# Patient Record
Sex: MALE | Race: BLACK OR AFRICAN AMERICAN | Employment: FULL TIME | URBAN - METROPOLITAN AREA
[De-identification: names, ages, dates, MRNs, and addresses within clinical notes are randomized per-mention and may not be internally consistent; named-entity substitution may affect disease eponyms.]

---

## 2017-05-26 ENCOUNTER — OFFICE VISIT (OUTPATIENT)
Dept: FAMILY MEDICINE CLINIC | Facility: CLINIC | Age: 42
End: 2017-05-26

## 2017-05-26 VITALS
OXYGEN SATURATION: 98 % | WEIGHT: 231.48 LBS | DIASTOLIC BLOOD PRESSURE: 130 MMHG | HEIGHT: 72 IN | SYSTOLIC BLOOD PRESSURE: 170 MMHG | HEART RATE: 90 BPM | RESPIRATION RATE: 15 BRPM | BODY MASS INDEX: 31.35 KG/M2

## 2017-05-26 DIAGNOSIS — I10 ESSENTIAL HYPERTENSION: ICD-10-CM

## 2017-05-26 PROCEDURE — 99999 PR OFFICE/OUTPT VISIT,PROCEDURE ONLY: CPT | Performed by: FAMILY MEDICINE

## 2017-05-26 ASSESSMENT — ENCOUNTER SYMPTOMS
CHEST TIGHTNESS: 0
TROUBLE SWALLOWING: 0
ABDOMINAL PAIN: 0
VOMITING: 0
COUGH: 0
SORE THROAT: 0
EYE PAIN: 0
SHORTNESS OF BREATH: 0
NAUSEA: 0
BACK PAIN: 1
WHEEZING: 0
DIARRHEA: 0

## 2017-06-05 ENCOUNTER — TELEPHONE (OUTPATIENT)
Dept: FAMILY MEDICINE CLINIC | Facility: CLINIC | Age: 42
End: 2017-06-05

## 2017-06-16 ENCOUNTER — TELEPHONE (OUTPATIENT)
Dept: ORTHOPEDIC SURGERY | Age: 42
End: 2017-06-16

## 2017-06-19 ENCOUNTER — OFFICE VISIT (OUTPATIENT)
Dept: FAMILY MEDICINE CLINIC | Facility: CLINIC | Age: 42
End: 2017-06-19

## 2017-06-19 VITALS
OXYGEN SATURATION: 98 % | HEART RATE: 65 BPM | WEIGHT: 231.48 LBS | BODY MASS INDEX: 31.35 KG/M2 | SYSTOLIC BLOOD PRESSURE: 158 MMHG | HEIGHT: 72 IN | RESPIRATION RATE: 15 BRPM | DIASTOLIC BLOOD PRESSURE: 109 MMHG

## 2017-06-19 DIAGNOSIS — I10 ESSENTIAL HYPERTENSION: ICD-10-CM

## 2017-06-19 DIAGNOSIS — S93.402D SPRAIN OF LEFT ANKLE, UNSPECIFIED LIGAMENT, SUBSEQUENT ENCOUNTER: Primary | ICD-10-CM

## 2017-06-19 PROCEDURE — 99999 PR OFFICE/OUTPT VISIT,PROCEDURE ONLY: CPT | Performed by: FAMILY MEDICINE

## 2017-06-19 RX ORDER — AMLODIPINE BESYLATE 10 MG/1
10 TABLET ORAL DAILY
Qty: 30 TABLET | Refills: 3 | Status: SHIPPED | OUTPATIENT
Start: 2017-06-19 | End: 2018-09-25

## 2017-06-19 RX ORDER — TRIAMTERENE AND HYDROCHLOROTHIAZIDE 75; 50 MG/1; MG/1
1 TABLET ORAL DAILY
Qty: 30 TABLET | Refills: 3 | Status: SHIPPED | OUTPATIENT
Start: 2017-06-19 | End: 2018-09-25

## 2017-07-14 ENCOUNTER — OFFICE VISIT (OUTPATIENT)
Dept: FAMILY MEDICINE CLINIC | Facility: CLINIC | Age: 42
End: 2017-07-14

## 2017-07-14 ENCOUNTER — TELEPHONE (OUTPATIENT)
Dept: FAMILY MEDICINE CLINIC | Facility: CLINIC | Age: 42
End: 2017-07-14

## 2017-07-14 VITALS
RESPIRATION RATE: 15 BRPM | HEART RATE: 62 BPM | OXYGEN SATURATION: 99 % | HEIGHT: 72 IN | SYSTOLIC BLOOD PRESSURE: 135 MMHG | WEIGHT: 233.69 LBS | BODY MASS INDEX: 31.65 KG/M2 | DIASTOLIC BLOOD PRESSURE: 97 MMHG

## 2017-07-14 DIAGNOSIS — I10 ESSENTIAL HYPERTENSION: Primary | ICD-10-CM

## 2017-07-14 PROCEDURE — 99999 PR OFFICE/OUTPT VISIT,PROCEDURE ONLY: CPT | Performed by: FAMILY MEDICINE

## 2017-07-14 RX ORDER — METOPROLOL SUCCINATE 50 MG/1
50 TABLET, EXTENDED RELEASE ORAL 2 TIMES DAILY
Qty: 60 TABLET | Refills: 3 | Status: CANCELLED | OUTPATIENT
Start: 2017-07-14

## 2017-07-17 RX ORDER — METOPROLOL TARTRATE 50 MG/1
50 TABLET, FILM COATED ORAL 2 TIMES DAILY
Qty: 60 TABLET | Refills: 3 | OUTPATIENT
Start: 2017-07-17 | End: 2018-01-03

## 2018-01-03 PROBLEM — N28.9 RENAL INSUFFICIENCY: Status: ACTIVE | Noted: 2018-01-03

## 2018-03-28 ENCOUNTER — TELEPHONE (OUTPATIENT)
Dept: INTERNAL MEDICINE CLINIC | Age: 43
End: 2018-03-28

## 2018-09-25 ENCOUNTER — HOSPITAL ENCOUNTER (EMERGENCY)
Age: 43
Discharge: HOME | End: 2018-09-25

## 2018-09-25 ENCOUNTER — APPOINTMENT (OUTPATIENT)
Dept: GENERAL RADIOLOGY | Age: 43
End: 2018-09-25

## 2018-09-25 ENCOUNTER — APPOINTMENT (OUTPATIENT)
Dept: CT IMAGING | Age: 43
End: 2018-09-25

## 2018-09-25 VITALS
RESPIRATION RATE: 17 BRPM | SYSTOLIC BLOOD PRESSURE: 178 MMHG | TEMPERATURE: 97.6 F | HEART RATE: 86 BPM | OXYGEN SATURATION: 97 % | DIASTOLIC BLOOD PRESSURE: 121 MMHG

## 2018-09-25 DIAGNOSIS — N28.9 RENAL INSUFFICIENCY: Primary | ICD-10-CM

## 2018-09-25 DIAGNOSIS — I10 ESSENTIAL HYPERTENSION: ICD-10-CM

## 2018-09-25 DIAGNOSIS — R10.31 RIGHT LOWER QUADRANT ABDOMINAL PAIN: ICD-10-CM

## 2018-09-25 LAB
A/G RATIO: 1 (ref 1.1–2.2)
ALBUMIN SERPL-MCNC: 4 G/DL (ref 3.4–5)
ALP BLD-CCNC: 80 U/L (ref 40–129)
ALT SERPL-CCNC: 26 U/L (ref 10–40)
ANION GAP SERPL CALCULATED.3IONS-SCNC: 14 MMOL/L (ref 3–16)
AST SERPL-CCNC: 21 U/L (ref 15–37)
BASOPHILS ABSOLUTE: 0 K/UL (ref 0–0.2)
BASOPHILS RELATIVE PERCENT: 0.7 %
BILIRUB SERPL-MCNC: 0.3 MG/DL (ref 0–1)
BILIRUBIN URINE: NEGATIVE
BLOOD, URINE: NEGATIVE
BUN BLDV-MCNC: 18 MG/DL (ref 7–20)
CALCIUM SERPL-MCNC: 9.4 MG/DL (ref 8.3–10.6)
CHLORIDE BLD-SCNC: 106 MMOL/L (ref 99–110)
CLARITY: CLEAR
CO2: 20 MMOL/L (ref 21–32)
COLOR: YELLOW
CREAT SERPL-MCNC: 1.6 MG/DL (ref 0.9–1.3)
EOSINOPHILS ABSOLUTE: 0.1 K/UL (ref 0–0.6)
EOSINOPHILS RELATIVE PERCENT: 1.5 %
GFR AFRICAN AMERICAN: 57
GFR NON-AFRICAN AMERICAN: 47
GLOBULIN: 4.1 G/DL
GLUCOSE BLD-MCNC: 109 MG/DL (ref 70–99)
GLUCOSE URINE: NEGATIVE MG/DL
HCT VFR BLD CALC: 44.6 % (ref 40.5–52.5)
HEMOGLOBIN: 15.1 G/DL (ref 13.5–17.5)
KETONES, URINE: NEGATIVE MG/DL
LEUKOCYTE ESTERASE, URINE: NEGATIVE
LIPASE: 36 U/L (ref 13–60)
LYMPHOCYTES ABSOLUTE: 1.9 K/UL (ref 1–5.1)
LYMPHOCYTES RELATIVE PERCENT: 38.9 %
MCH RBC QN AUTO: 29.8 PG (ref 26–34)
MCHC RBC AUTO-ENTMCNC: 33.9 G/DL (ref 31–36)
MCV RBC AUTO: 87.8 FL (ref 80–100)
MICROSCOPIC EXAMINATION: NORMAL
MONOCYTES ABSOLUTE: 0.4 K/UL (ref 0–1.3)
MONOCYTES RELATIVE PERCENT: 8.8 %
NEUTROPHILS ABSOLUTE: 2.4 K/UL (ref 1.7–7.7)
NEUTROPHILS RELATIVE PERCENT: 50.1 %
NITRITE, URINE: NEGATIVE
PDW BLD-RTO: 14.5 % (ref 12.4–15.4)
PH UA: 5.5
PLATELET # BLD: 354 K/UL (ref 135–450)
PMV BLD AUTO: 8.1 FL (ref 5–10.5)
POTASSIUM SERPL-SCNC: 4.1 MMOL/L (ref 3.5–5.1)
PROTEIN UA: NEGATIVE MG/DL
RBC # BLD: 5.08 M/UL (ref 4.2–5.9)
SODIUM BLD-SCNC: 140 MMOL/L (ref 136–145)
SPECIFIC GRAVITY UA: 1.03
TOTAL PROTEIN: 8.1 G/DL (ref 6.4–8.2)
URINE REFLEX TO CULTURE: NORMAL
URINE TYPE: NORMAL
UROBILINOGEN, URINE: 0.2 E.U./DL
WBC # BLD: 4.8 K/UL (ref 4–11)

## 2018-09-25 PROCEDURE — 83690 ASSAY OF LIPASE: CPT

## 2018-09-25 PROCEDURE — 85025 COMPLETE CBC W/AUTO DIFF WBC: CPT

## 2018-09-25 PROCEDURE — 6360000002 HC RX W HCPCS: Performed by: PHYSICIAN ASSISTANT

## 2018-09-25 PROCEDURE — 80053 COMPREHEN METABOLIC PANEL: CPT

## 2018-09-25 PROCEDURE — 96374 THER/PROPH/DIAG INJ IV PUSH: CPT

## 2018-09-25 PROCEDURE — 36415 COLL VENOUS BLD VENIPUNCTURE: CPT

## 2018-09-25 PROCEDURE — 74176 CT ABD & PELVIS W/O CONTRAST: CPT

## 2018-09-25 PROCEDURE — 81003 URINALYSIS AUTO W/O SCOPE: CPT

## 2018-09-25 PROCEDURE — 99284 EMERGENCY DEPT VISIT MOD MDM: CPT

## 2018-09-25 RX ORDER — TRIAMTERENE AND HYDROCHLOROTHIAZIDE 75; 50 MG/1; MG/1
1 TABLET ORAL DAILY
Qty: 30 TABLET | Refills: 3 | Status: ON HOLD | OUTPATIENT
Start: 2018-09-25 | End: 2019-09-27 | Stop reason: SDUPTHER

## 2018-09-25 RX ORDER — ASPIRIN 81 MG/1
324 TABLET, CHEWABLE ORAL ONCE
Status: DISCONTINUED | OUTPATIENT
Start: 2018-09-25 | End: 2018-09-25

## 2018-09-25 RX ORDER — KETOROLAC TROMETHAMINE 30 MG/ML
30 INJECTION, SOLUTION INTRAMUSCULAR; INTRAVENOUS ONCE
Status: COMPLETED | OUTPATIENT
Start: 2018-09-25 | End: 2018-09-25

## 2018-09-25 RX ORDER — AMLODIPINE BESYLATE 10 MG/1
10 TABLET ORAL DAILY
Qty: 30 TABLET | Refills: 3 | Status: ON HOLD | OUTPATIENT
Start: 2018-09-25 | End: 2019-09-26

## 2018-09-25 RX ORDER — METOPROLOL TARTRATE 50 MG/1
50 TABLET, FILM COATED ORAL 2 TIMES DAILY
Qty: 30 TABLET | Refills: 0 | Status: ON HOLD | OUTPATIENT
Start: 2018-09-25 | End: 2019-09-27 | Stop reason: SDUPTHER

## 2018-09-25 RX ORDER — NITROGLYCERIN 0.4 MG/1
0.4 TABLET SUBLINGUAL EVERY 5 MIN PRN
Status: DISCONTINUED | OUTPATIENT
Start: 2018-09-25 | End: 2018-09-25

## 2018-09-25 RX ADMIN — KETOROLAC TROMETHAMINE 30 MG: 30 INJECTION, SOLUTION INTRAMUSCULAR at 17:30

## 2018-09-25 ASSESSMENT — PAIN SCALES - GENERAL
PAINLEVEL_OUTOF10: 10
PAINLEVEL_OUTOF10: 10

## 2018-09-25 ASSESSMENT — PAIN DESCRIPTION - LOCATION: LOCATION: ABDOMEN;BACK

## 2018-09-25 ASSESSMENT — PAIN DESCRIPTION - PAIN TYPE: TYPE: ACUTE PAIN

## 2018-09-25 ASSESSMENT — PAIN DESCRIPTION - FREQUENCY: FREQUENCY: CONTINUOUS

## 2018-09-25 NOTE — ED PROVIDER NOTES
As provider-in-triage, I performed a brief history and physical exam to establish acuity, and ordered appropriate tests to develop a basic plan of care. Patient will have a more thorough history and physical exam and will be documented by the treating PEYTON and/or physician who will evaluate the patient. PROVIDER [facility]  eMERGENCY dEPARTMENT eNCOUnter   Pt Name: Ramon Jessica  MRN: [id number]  [place] 1975  Date of evaluation: 9/25/2018  PCP: Akira Garcia MD    Chief Complaint   Patient presents with    Abdominal Pain     PT to er reporting right sided abdominal pain starting yesterday. HISTORY OF PRESENT ILLNESS  Ramon Jessica is a 37 y.o. male who presents to the emergency department with complaints of Right flank pain. This was dull when it started this morning to get worse about 3 hours ago. He has had kidney stones in the past.  Denies nausea, vomiting, fevers or urinary bowel symptoms. PHYSICAL EXAM  BP (!) 178/121   Pulse 86   Temp 97.6 °F (36.4 °C) (Infrared)   Resp 17   SpO2 97%     On brief exam with patient sitting up, the patient appears well-hydrated, well-nourished, and in no acute distress. Mucous membranes are moist.    Skin is dry. Mental status is normal. Moves all extremities, and is without facial droop. Speech is clear. Heart is regular rate and rhythm. Breathing is unlabored. Lungs clear to auscultation bilaterally. Plan:    Medications   ketorolac (TORADOL) injection 30 mg (not administered)       LABS:   Labs Reviewed   CBC WITH AUTO DIFFERENTIAL   COMPREHENSIVE METABOLIC PANEL   URINE RT REFLEX TO CULTURE   LIPASE     Radiology Studies:   CT ABDOMEN PELVIS WO CONTRAST    (Results Pending)       For all further history/physical please see subsequent provider note for further details and disposition.     Comment: Please note this report has been produced using speech recognition software and may
DISPOSITION Decision To Discharge 09/25/2018 06:12:39 PM      PATIENT REFERRED TO:  Tamera Schmitt  [phone]  Call today  For a recheck in 2-7 days    Dennise Ludn MD  [address], [address] [facility] [address]  [phone]    Call today  For a recheck in 2-7 days      DISCHARGE MEDICATIONS:  New Prescriptions    No medications on file       DISCONTINUED MEDICATIONS:  Discontinued Medications    ACETAMINOPHEN (AMINOFEN) 325 MG TABLET    Take 2 tablets by mouth every 6 hours as needed for Pain              (Please note the MDM and HPI sections of this note were completed with a voice recognition program.  Efforts were made to edit the dictations but occasionally words are mis-transcribed.)    Electronically signed, Jones Mandel,          Jones Mandel  09/25/18 6649

## 2018-09-25 NOTE — ED NOTES
Patient discharged at this time in no acute distress after verbalizing understanding of discharge instructions and need for follow up with PCP .   Pt left ambulatory to discharge area after reviewing and receiving copy of ov AVS.   Patient education  Learner- Patient and family  Motivation and readiness to learn- Medium to High  Barriers to learning- None  Learning preference/provided instructions- Both written and verbal discharge instructions     Tung Fitzgerald RN  09/25/18 [id number]

## 2019-03-01 ENCOUNTER — HOSPITAL ENCOUNTER (EMERGENCY)
Age: 44
Discharge: HOME | End: 2019-03-01

## 2019-03-01 ENCOUNTER — APPOINTMENT (OUTPATIENT)
Dept: GENERAL RADIOLOGY | Age: 44
End: 2019-03-01

## 2019-03-01 VITALS
BODY MASS INDEX: 31.48 KG/M2 | DIASTOLIC BLOOD PRESSURE: 99 MMHG | SYSTOLIC BLOOD PRESSURE: 163 MMHG | HEART RATE: 89 BPM | WEIGHT: 232.13 LBS | RESPIRATION RATE: 21 BRPM | OXYGEN SATURATION: 98 % | TEMPERATURE: 103.1 F

## 2019-03-01 DIAGNOSIS — J10.1 INFLUENZA A: Primary | ICD-10-CM

## 2019-03-01 LAB
ANION GAP SERPL CALCULATED.3IONS-SCNC: 14 MMOL/L (ref 3–16)
BANDED NEUTROPHILS RELATIVE PERCENT: 6 % (ref 0–7)
BASOPHILS ABSOLUTE: 0 K/UL (ref 0–0.2)
BASOPHILS RELATIVE PERCENT: 0 %
BUN BLDV-MCNC: 15 MG/DL (ref 7–20)
CALCIUM SERPL-MCNC: 8.1 MG/DL (ref 8.3–10.6)
CHLORIDE BLD-SCNC: 104 MMOL/L (ref 99–110)
CO2: 17 MMOL/L (ref 21–32)
CREAT SERPL-MCNC: 1.6 MG/DL (ref 0.9–1.3)
EKG ATRIAL RATE: 97 BPM
EKG DIAGNOSIS: NORMAL
EKG P AXIS: 29 DEGREES
EKG P-R INTERVAL: 142 MS
EKG Q-T INTERVAL: 318 MS
EKG QRS DURATION: 74 MS
EKG QTC CALCULATION (BAZETT): 403 MS
EKG R AXIS: 38 DEGREES
EKG T AXIS: 5 DEGREES
EKG VENTRICULAR RATE: 97 BPM
EOSINOPHILS ABSOLUTE: 0 K/UL (ref 0–0.6)
EOSINOPHILS RELATIVE PERCENT: 0 %
GFR AFRICAN AMERICAN: 57
GFR NON-AFRICAN AMERICAN: 47
GLUCOSE BLD-MCNC: 113 MG/DL (ref 70–99)
HCT VFR BLD CALC: 41.6 % (ref 40.5–52.5)
HEMOGLOBIN: 14.1 G/DL (ref 13.5–17.5)
LYMPHOCYTES ABSOLUTE: 1.4 K/UL (ref 1–5.1)
LYMPHOCYTES RELATIVE PERCENT: 17 %
MCH RBC QN AUTO: 29.8 PG (ref 26–34)
MCHC RBC AUTO-ENTMCNC: 33.8 G/DL (ref 31–36)
MCV RBC AUTO: 88.2 FL (ref 80–100)
MONOCYTES ABSOLUTE: 0.3 K/UL (ref 0–1.3)
MONOCYTES RELATIVE PERCENT: 3 %
NEUTROPHILS ABSOLUTE: 6.8 K/UL (ref 1.7–7.7)
NEUTROPHILS RELATIVE PERCENT: 74 %
PDW BLD-RTO: 14 % (ref 12.4–15.4)
PLATELET # BLD: 362 K/UL (ref 135–450)
PLATELET SLIDE REVIEW: ADEQUATE
PMV BLD AUTO: 7.8 FL (ref 5–10.5)
POTASSIUM SERPL-SCNC: 3.8 MMOL/L (ref 3.5–5.1)
RAPID INFLUENZA  B AGN: NEGATIVE
RAPID INFLUENZA A AGN: POSITIVE
RBC # BLD: 4.72 M/UL (ref 4.2–5.9)
REASON FOR REJECTION: NORMAL
REJECTED TEST: NORMAL
SLIDE REVIEW: NORMAL
SODIUM BLD-SCNC: 135 MMOL/L (ref 136–145)
WBC # BLD: 8.5 K/UL (ref 4–11)

## 2019-03-01 PROCEDURE — 93010 ELECTROCARDIOGRAM REPORT: CPT | Performed by: INTERNAL MEDICINE

## 2019-03-01 PROCEDURE — 6360000002 HC RX W HCPCS: Performed by: NURSE PRACTITIONER

## 2019-03-01 PROCEDURE — 96374 THER/PROPH/DIAG INJ IV PUSH: CPT

## 2019-03-01 PROCEDURE — 96361 HYDRATE IV INFUSION ADD-ON: CPT

## 2019-03-01 PROCEDURE — 96375 TX/PRO/DX INJ NEW DRUG ADDON: CPT

## 2019-03-01 PROCEDURE — 85025 COMPLETE CBC W/AUTO DIFF WBC: CPT

## 2019-03-01 PROCEDURE — 36415 COLL VENOUS BLD VENIPUNCTURE: CPT

## 2019-03-01 PROCEDURE — 6370000000 HC RX 637 (ALT 250 FOR IP): Performed by: NURSE PRACTITIONER

## 2019-03-01 PROCEDURE — 87804 INFLUENZA ASSAY W/OPTIC: CPT

## 2019-03-01 PROCEDURE — 2580000003 HC RX 258: Performed by: NURSE PRACTITIONER

## 2019-03-01 PROCEDURE — 99285 EMERGENCY DEPT VISIT HI MDM: CPT

## 2019-03-01 PROCEDURE — 80048 BASIC METABOLIC PNL TOTAL CA: CPT

## 2019-03-01 PROCEDURE — 93005 ELECTROCARDIOGRAM TRACING: CPT | Performed by: EMERGENCY MEDICINE

## 2019-03-01 PROCEDURE — 71046 X-RAY EXAM CHEST 2 VIEWS: CPT

## 2019-03-01 RX ORDER — ACETAMINOPHEN 500 MG
1000 TABLET ORAL ONCE
Status: COMPLETED | OUTPATIENT
Start: 2019-03-01 | End: 2019-03-01

## 2019-03-01 RX ORDER — ONDANSETRON 2 MG/ML
4 INJECTION INTRAMUSCULAR; INTRAVENOUS EVERY 30 MIN PRN
Status: DISCONTINUED | OUTPATIENT
Start: 2019-03-01 | End: 2019-03-01 | Stop reason: HOSPADM

## 2019-03-01 RX ORDER — KETOROLAC TROMETHAMINE 30 MG/ML
30 INJECTION, SOLUTION INTRAMUSCULAR; INTRAVENOUS ONCE
Status: COMPLETED | OUTPATIENT
Start: 2019-03-01 | End: 2019-03-01

## 2019-03-01 RX ORDER — 0.9 % SODIUM CHLORIDE 0.9 %
1000 INTRAVENOUS SOLUTION INTRAVENOUS ONCE
Status: COMPLETED | OUTPATIENT
Start: 2019-03-01 | End: 2019-03-01

## 2019-03-01 RX ORDER — OSELTAMIVIR PHOSPHATE 75 MG/1
75 CAPSULE ORAL 2 TIMES DAILY
Qty: 10 CAPSULE | Refills: 0 | Status: SHIPPED | OUTPATIENT
Start: 2019-03-01 | End: 2019-03-06

## 2019-03-01 RX ADMIN — ONDANSETRON 4 MG: 2 INJECTION INTRAMUSCULAR; INTRAVENOUS at 10:29

## 2019-03-01 RX ADMIN — ACETAMINOPHEN 1000 MG: 500 TABLET, FILM COATED ORAL at 10:28

## 2019-03-01 RX ADMIN — SODIUM CHLORIDE 1000 ML: 9 INJECTION, SOLUTION INTRAVENOUS at 10:28

## 2019-03-01 RX ADMIN — KETOROLAC TROMETHAMINE 30 MG: 30 INJECTION, SOLUTION INTRAMUSCULAR at 10:29

## 2019-03-01 ASSESSMENT — ENCOUNTER SYMPTOMS
SHORTNESS OF BREATH: 1
CONSTIPATION: 0
ABDOMINAL PAIN: 0
SORE THROAT: 0
NAUSEA: 0
RHINORRHEA: 0
VOMITING: 0
COUGH: 1
DIARRHEA: 0
BLOOD IN STOOL: 0

## 2019-03-01 ASSESSMENT — PAIN SCALES - GENERAL
PAINLEVEL_OUTOF10: 4
PAINLEVEL_OUTOF10: 9

## 2019-03-01 ASSESSMENT — PAIN DESCRIPTION - PAIN TYPE: TYPE: ACUTE PAIN

## 2019-03-05 ENCOUNTER — APPOINTMENT (OUTPATIENT)
Dept: GENERAL RADIOLOGY | Age: 44
End: 2019-03-05

## 2019-03-05 ENCOUNTER — HOSPITAL ENCOUNTER (EMERGENCY)
Age: 44
Discharge: HOME | End: 2019-03-05
Attending: EMERGENCY MEDICINE

## 2019-03-05 VITALS
OXYGEN SATURATION: 94 % | RESPIRATION RATE: 18 BRPM | DIASTOLIC BLOOD PRESSURE: 95 MMHG | BODY MASS INDEX: 31.15 KG/M2 | SYSTOLIC BLOOD PRESSURE: 158 MMHG | HEART RATE: 101 BPM | WEIGHT: 230 LBS | HEIGHT: 72 IN | TEMPERATURE: 99.1 F

## 2019-03-05 DIAGNOSIS — J20.9 ACUTE BRONCHITIS, UNSPECIFIED ORGANISM: Primary | ICD-10-CM

## 2019-03-05 PROCEDURE — 94640 AIRWAY INHALATION TREATMENT: CPT

## 2019-03-05 PROCEDURE — 71046 X-RAY EXAM CHEST 2 VIEWS: CPT

## 2019-03-05 PROCEDURE — 6370000000 HC RX 637 (ALT 250 FOR IP): Performed by: PHYSICIAN ASSISTANT

## 2019-03-05 PROCEDURE — 99283 EMERGENCY DEPT VISIT LOW MDM: CPT

## 2019-03-05 RX ORDER — IBUPROFEN 800 MG/1
800 TABLET ORAL ONCE
Status: COMPLETED | OUTPATIENT
Start: 2019-03-05 | End: 2019-03-05

## 2019-03-05 RX ORDER — AZITHROMYCIN 250 MG/1
TABLET, FILM COATED ORAL
Qty: 1 PACKET | Refills: 0 | Status: SHIPPED | OUTPATIENT
Start: 2019-03-05 | End: 2019-03-15

## 2019-03-05 RX ORDER — IPRATROPIUM BROMIDE AND ALBUTEROL SULFATE 2.5; .5 MG/3ML; MG/3ML
1 SOLUTION RESPIRATORY (INHALATION) ONCE
Status: COMPLETED | OUTPATIENT
Start: 2019-03-05 | End: 2019-03-05

## 2019-03-05 RX ORDER — PREDNISONE 10 MG/1
60 TABLET ORAL DAILY
Qty: 30 TABLET | Refills: 0 | Status: SHIPPED | OUTPATIENT
Start: 2019-03-05 | End: 2019-03-10

## 2019-03-05 RX ORDER — PREDNISONE 20 MG/1
60 TABLET ORAL ONCE
Status: COMPLETED | OUTPATIENT
Start: 2019-03-05 | End: 2019-03-05

## 2019-03-05 RX ORDER — ALBUTEROL SULFATE 90 UG/1
2 AEROSOL, METERED RESPIRATORY (INHALATION) EVERY 4 HOURS PRN
Qty: 1 INHALER | Refills: 0 | Status: SHIPPED | OUTPATIENT
Start: 2019-03-05 | End: 2021-06-20

## 2019-03-05 RX ADMIN — IPRATROPIUM BROMIDE AND ALBUTEROL SULFATE 1 AMPULE: .5; 3 SOLUTION RESPIRATORY (INHALATION) at 10:44

## 2019-03-05 RX ADMIN — IBUPROFEN 800 MG: 800 TABLET, FILM COATED ORAL at 10:56

## 2019-03-05 RX ADMIN — PREDNISONE 60 MG: 20 TABLET ORAL at 10:56

## 2019-03-05 ASSESSMENT — ENCOUNTER SYMPTOMS
ABDOMINAL PAIN: 0
BACK PAIN: 0
ABDOMINAL DISTENTION: 0
VOMITING: 0
WHEEZING: 0
RHINORRHEA: 1
SHORTNESS OF BREATH: 1
CONSTIPATION: 0
VOICE CHANGE: 0
ALLERGIC/IMMUNOLOGIC NEGATIVE: 1
SORE THROAT: 1
COUGH: 1
COLOR CHANGE: 0
CHEST TIGHTNESS: 1
DIARRHEA: 0
STRIDOR: 0
NAUSEA: 0
TROUBLE SWALLOWING: 0

## 2019-03-05 ASSESSMENT — PAIN DESCRIPTION - LOCATION
LOCATION: GENERALIZED
LOCATION: THROAT

## 2019-03-05 ASSESSMENT — PAIN DESCRIPTION - PAIN TYPE: TYPE: ACUTE PAIN

## 2019-03-05 ASSESSMENT — PAIN SCALES - GENERAL: PAINLEVEL_OUTOF10: 10

## 2019-03-07 ENCOUNTER — OFFICE VISIT (OUTPATIENT)
Dept: PRIMARY CARE CLINIC | Age: 44
End: 2019-03-07

## 2019-03-07 VITALS
HEART RATE: 91 BPM | WEIGHT: 226.2 LBS | SYSTOLIC BLOOD PRESSURE: 132 MMHG | TEMPERATURE: 98.2 F | BODY MASS INDEX: 30.68 KG/M2 | DIASTOLIC BLOOD PRESSURE: 84 MMHG | OXYGEN SATURATION: 98 %

## 2019-03-07 DIAGNOSIS — J20.9 ACUTE BRONCHITIS, UNSPECIFIED ORGANISM: ICD-10-CM

## 2019-03-07 DIAGNOSIS — J11.1 INFLUENZA: Primary | ICD-10-CM

## 2019-03-07 DIAGNOSIS — R06.2 DIFFUSE WHEEZING: ICD-10-CM

## 2019-03-07 PROCEDURE — 99203 OFFICE O/P NEW LOW 30 MIN: CPT | Performed by: NURSE PRACTITIONER

## 2019-03-07 RX ORDER — AMOXICILLIN AND CLAVULANATE POTASSIUM 875; 125 MG/1; MG/1
1 TABLET, FILM COATED ORAL 2 TIMES DAILY
Qty: 20 TABLET | Refills: 0 | Status: SHIPPED | OUTPATIENT
Start: 2019-03-07 | End: 2019-03-17

## 2019-03-07 RX ORDER — ALBUTEROL SULFATE 2.5 MG/3ML
2.5 SOLUTION RESPIRATORY (INHALATION) ONCE
Status: DISCONTINUED | OUTPATIENT
Start: 2019-03-07 | End: 2021-11-26 | Stop reason: HOSPADM

## 2019-03-13 ASSESSMENT — ENCOUNTER SYMPTOMS
WHEEZING: 1
CONSTIPATION: 0
COUGH: 1
EYE ITCHING: 0
DIARRHEA: 0
CHOKING: 0
NAUSEA: 0
SORE THROAT: 1
SHORTNESS OF BREATH: 1
BACK PAIN: 0
VOMITING: 0
ABDOMINAL PAIN: 0
EYE REDNESS: 0
STRIDOR: 0
EYE PAIN: 0
RHINORRHEA: 0
SINUS PAIN: 0
APNEA: 0
COLOR CHANGE: 0
SINUS PRESSURE: 0
EYE DISCHARGE: 0
TROUBLE SWALLOWING: 0
CHEST TIGHTNESS: 1

## 2019-03-18 ENCOUNTER — TELEPHONE (OUTPATIENT)
Dept: PRIMARY CARE CLINIC | Age: 44
End: 2019-03-18

## 2019-09-26 ENCOUNTER — APPOINTMENT (OUTPATIENT)
Dept: GENERAL RADIOLOGY | Age: 44
DRG: 305 | End: 2019-09-26

## 2019-09-26 ENCOUNTER — HOSPITAL ENCOUNTER (INPATIENT)
Age: 44
LOS: 1 days | Discharge: HOME | DRG: 305 | End: 2019-09-27
Attending: EMERGENCY MEDICINE | Admitting: EMERGENCY MEDICINE

## 2019-09-26 ENCOUNTER — APPOINTMENT (OUTPATIENT)
Dept: CT IMAGING | Age: 44
DRG: 305 | End: 2019-09-26

## 2019-09-26 DIAGNOSIS — R06.02 SOB (SHORTNESS OF BREATH): ICD-10-CM

## 2019-09-26 DIAGNOSIS — I10 ESSENTIAL HYPERTENSION: ICD-10-CM

## 2019-09-26 DIAGNOSIS — I10 UNCONTROLLED HYPERTENSION: Primary | ICD-10-CM

## 2019-09-26 DIAGNOSIS — N18.9 CHRONIC KIDNEY DISEASE, UNSPECIFIED CKD STAGE: ICD-10-CM

## 2019-09-26 PROBLEM — R07.9 CHEST PAIN: Status: ACTIVE | Noted: 2019-09-26

## 2019-09-26 LAB
ANION GAP SERPL CALCULATED.3IONS-SCNC: 11 MMOL/L (ref 3–16)
BASOPHILS ABSOLUTE: 0 K/UL (ref 0–0.2)
BASOPHILS RELATIVE PERCENT: 0.8 %
BUN BLDV-MCNC: 12 MG/DL (ref 7–20)
CALCIUM SERPL-MCNC: 8.9 MG/DL (ref 8.3–10.6)
CHLORIDE BLD-SCNC: 110 MMOL/L (ref 99–110)
CO2: 20 MMOL/L (ref 21–32)
CREAT SERPL-MCNC: 1.4 MG/DL (ref 0.9–1.3)
EKG ATRIAL RATE: 77 BPM
EKG DIAGNOSIS: NORMAL
EKG P AXIS: 25 DEGREES
EKG P-R INTERVAL: 154 MS
EKG Q-T INTERVAL: 418 MS
EKG QRS DURATION: 82 MS
EKG QTC CALCULATION (BAZETT): 473 MS
EKG R AXIS: 31 DEGREES
EKG T AXIS: 5 DEGREES
EKG VENTRICULAR RATE: 77 BPM
EOSINOPHILS ABSOLUTE: 0.2 K/UL (ref 0–0.6)
EOSINOPHILS RELATIVE PERCENT: 3.5 %
GFR AFRICAN AMERICAN: >60
GFR NON-AFRICAN AMERICAN: 55
GLUCOSE BLD-MCNC: 84 MG/DL (ref 70–99)
HCT VFR BLD CALC: 43.8 % (ref 40.5–52.5)
HEMOGLOBIN: 14.3 G/DL (ref 13.5–17.5)
LYMPHOCYTES ABSOLUTE: 2 K/UL (ref 1–5.1)
LYMPHOCYTES RELATIVE PERCENT: 34.2 %
MCH RBC QN AUTO: 28.7 PG (ref 26–34)
MCHC RBC AUTO-ENTMCNC: 32.7 G/DL (ref 31–36)
MCV RBC AUTO: 88 FL (ref 80–100)
MONOCYTES ABSOLUTE: 0.7 K/UL (ref 0–1.3)
MONOCYTES RELATIVE PERCENT: 11.9 %
NEUTROPHILS ABSOLUTE: 2.8 K/UL (ref 1.7–7.7)
NEUTROPHILS RELATIVE PERCENT: 49.6 %
PDW BLD-RTO: 15.1 % (ref 12.4–15.4)
PLATELET # BLD: 305 K/UL (ref 135–450)
PMV BLD AUTO: 7.9 FL (ref 5–10.5)
POTASSIUM SERPL-SCNC: 3.6 MMOL/L (ref 3.5–5.1)
PRO-BNP: 17 PG/ML (ref 0–124)
RBC # BLD: 4.98 M/UL (ref 4.2–5.9)
SODIUM BLD-SCNC: 141 MMOL/L (ref 136–145)
TROPONIN: <0.01 NG/ML
TROPONIN: <0.01 NG/ML
WBC # BLD: 5.7 K/UL (ref 4–11)

## 2019-09-26 PROCEDURE — 6360000002 HC RX W HCPCS: Performed by: EMERGENCY MEDICINE

## 2019-09-26 PROCEDURE — 96374 THER/PROPH/DIAG INJ IV PUSH: CPT

## 2019-09-26 PROCEDURE — 2580000003 HC RX 258: Performed by: INTERNAL MEDICINE

## 2019-09-26 PROCEDURE — 6360000002 HC RX W HCPCS: Performed by: INTERNAL MEDICINE

## 2019-09-26 PROCEDURE — 6360000002 HC RX W HCPCS: Performed by: NURSE PRACTITIONER

## 2019-09-26 PROCEDURE — 71046 X-RAY EXAM CHEST 2 VIEWS: CPT

## 2019-09-26 PROCEDURE — 84484 ASSAY OF TROPONIN QUANT: CPT

## 2019-09-26 PROCEDURE — 94640 AIRWAY INHALATION TREATMENT: CPT

## 2019-09-26 PROCEDURE — 6370000000 HC RX 637 (ALT 250 FOR IP): Performed by: NURSE PRACTITIONER

## 2019-09-26 PROCEDURE — 85025 COMPLETE CBC W/AUTO DIFF WBC: CPT

## 2019-09-26 PROCEDURE — 99285 EMERGENCY DEPT VISIT HI MDM: CPT

## 2019-09-26 PROCEDURE — 6370000000 HC RX 637 (ALT 250 FOR IP): Performed by: INTERNAL MEDICINE

## 2019-09-26 PROCEDURE — 71250 CT THORAX DX C-: CPT

## 2019-09-26 PROCEDURE — 1200000000 HC SEMI PRIVATE

## 2019-09-26 PROCEDURE — 94760 N-INVAS EAR/PLS OXIMETRY 1: CPT

## 2019-09-26 PROCEDURE — 6370000000 HC RX 637 (ALT 250 FOR IP): Performed by: EMERGENCY MEDICINE

## 2019-09-26 PROCEDURE — 80048 BASIC METABOLIC PNL TOTAL CA: CPT

## 2019-09-26 PROCEDURE — 93005 ELECTROCARDIOGRAM TRACING: CPT | Performed by: EMERGENCY MEDICINE

## 2019-09-26 PROCEDURE — 83880 ASSAY OF NATRIURETIC PEPTIDE: CPT

## 2019-09-26 PROCEDURE — 36415 COLL VENOUS BLD VENIPUNCTURE: CPT

## 2019-09-26 PROCEDURE — 93010 ELECTROCARDIOGRAM REPORT: CPT | Performed by: INTERNAL MEDICINE

## 2019-09-26 RX ORDER — AMLODIPINE BESYLATE 5 MG/1
10 TABLET ORAL ONCE
Status: COMPLETED | OUTPATIENT
Start: 2019-09-26 | End: 2019-09-26

## 2019-09-26 RX ORDER — LABETALOL HYDROCHLORIDE 5 MG/ML
20 INJECTION, SOLUTION INTRAVENOUS EVERY 6 HOURS PRN
Status: DISCONTINUED | OUTPATIENT
Start: 2019-09-26 | End: 2019-09-27 | Stop reason: HOSPADM

## 2019-09-26 RX ORDER — SODIUM CHLORIDE 0.9 % (FLUSH) 0.9 %
10 SYRINGE (ML) INJECTION PRN
Status: DISCONTINUED | OUTPATIENT
Start: 2019-09-26 | End: 2019-09-27 | Stop reason: HOSPADM

## 2019-09-26 RX ORDER — ALBUTEROL SULFATE 90 UG/1
2 AEROSOL, METERED RESPIRATORY (INHALATION) EVERY 4 HOURS PRN
Status: DISCONTINUED | OUTPATIENT
Start: 2019-09-26 | End: 2019-09-27 | Stop reason: HOSPADM

## 2019-09-26 RX ORDER — IPRATROPIUM BROMIDE AND ALBUTEROL SULFATE 2.5; .5 MG/3ML; MG/3ML
1 SOLUTION RESPIRATORY (INHALATION) ONCE
Status: COMPLETED | OUTPATIENT
Start: 2019-09-26 | End: 2019-09-26

## 2019-09-26 RX ORDER — ATORVASTATIN CALCIUM 40 MG/1
40 TABLET, FILM COATED ORAL NIGHTLY
Status: DISCONTINUED | OUTPATIENT
Start: 2019-09-26 | End: 2019-09-27 | Stop reason: HOSPADM

## 2019-09-26 RX ORDER — LISINOPRIL 10 MG/1
10 TABLET ORAL DAILY
Status: DISCONTINUED | OUTPATIENT
Start: 2019-09-27 | End: 2019-09-27 | Stop reason: HOSPADM

## 2019-09-26 RX ORDER — LEVOFLOXACIN 500 MG/1
500 TABLET, FILM COATED ORAL DAILY
Status: DISCONTINUED | OUTPATIENT
Start: 2019-09-26 | End: 2019-09-27 | Stop reason: HOSPADM

## 2019-09-26 RX ORDER — ACETAMINOPHEN 325 MG/1
650 TABLET ORAL EVERY 6 HOURS PRN
Status: DISCONTINUED | OUTPATIENT
Start: 2019-09-26 | End: 2019-09-27 | Stop reason: HOSPADM

## 2019-09-26 RX ORDER — METOPROLOL TARTRATE 50 MG/1
50 TABLET, FILM COATED ORAL ONCE
Status: COMPLETED | OUTPATIENT
Start: 2019-09-26 | End: 2019-09-26

## 2019-09-26 RX ORDER — METOPROLOL TARTRATE 50 MG/1
50 TABLET, FILM COATED ORAL 2 TIMES DAILY
Status: DISCONTINUED | OUTPATIENT
Start: 2019-09-26 | End: 2019-09-27 | Stop reason: HOSPADM

## 2019-09-26 RX ORDER — TRIAMTERENE AND HYDROCHLOROTHIAZIDE 37.5; 25 MG/1; MG/1
2 TABLET ORAL DAILY
Status: DISCONTINUED | OUTPATIENT
Start: 2019-09-27 | End: 2019-09-27 | Stop reason: HOSPADM

## 2019-09-26 RX ORDER — GUAIFENESIN/DEXTROMETHORPHAN 100-10MG/5
5 SYRUP ORAL EVERY 4 HOURS PRN
Status: DISCONTINUED | OUTPATIENT
Start: 2019-09-26 | End: 2019-09-27 | Stop reason: HOSPADM

## 2019-09-26 RX ORDER — SODIUM CHLORIDE 0.9 % (FLUSH) 0.9 %
10 SYRINGE (ML) INJECTION EVERY 12 HOURS SCHEDULED
Status: DISCONTINUED | OUTPATIENT
Start: 2019-09-26 | End: 2019-09-27 | Stop reason: HOSPADM

## 2019-09-26 RX ORDER — ONDANSETRON 2 MG/ML
4 INJECTION INTRAMUSCULAR; INTRAVENOUS EVERY 6 HOURS PRN
Status: DISCONTINUED | OUTPATIENT
Start: 2019-09-26 | End: 2019-09-27 | Stop reason: HOSPADM

## 2019-09-26 RX ORDER — ASPIRIN 81 MG/1
81 TABLET, CHEWABLE ORAL DAILY
Status: DISCONTINUED | OUTPATIENT
Start: 2019-09-27 | End: 2019-09-27 | Stop reason: HOSPADM

## 2019-09-26 RX ORDER — HYDRALAZINE HYDROCHLORIDE 20 MG/ML
10 INJECTION INTRAMUSCULAR; INTRAVENOUS ONCE
Status: COMPLETED | OUTPATIENT
Start: 2019-09-26 | End: 2019-09-26

## 2019-09-26 RX ORDER — NITROGLYCERIN 0.4 MG/1
0.4 TABLET SUBLINGUAL EVERY 5 MIN PRN
Status: DISCONTINUED | OUTPATIENT
Start: 2019-09-26 | End: 2019-09-27 | Stop reason: HOSPADM

## 2019-09-26 RX ORDER — ALBUTEROL SULFATE 2.5 MG/3ML
5 SOLUTION RESPIRATORY (INHALATION) ONCE
Status: DISCONTINUED | OUTPATIENT
Start: 2019-09-26 | End: 2019-09-26

## 2019-09-26 RX ORDER — DOXYCYCLINE HYCLATE 100 MG
100 TABLET ORAL ONCE
Status: COMPLETED | OUTPATIENT
Start: 2019-09-26 | End: 2019-09-26

## 2019-09-26 RX ORDER — TRIAMTERENE AND HYDROCHLOROTHIAZIDE 37.5; 25 MG/1; MG/1
2 TABLET ORAL ONCE
Status: COMPLETED | OUTPATIENT
Start: 2019-09-26 | End: 2019-09-26

## 2019-09-26 RX ADMIN — METOPROLOL TARTRATE 50 MG: 50 TABLET, FILM COATED ORAL at 14:41

## 2019-09-26 RX ADMIN — GUAIFENESIN AND DEXTROMETHORPHAN 5 ML: 100; 10 SYRUP ORAL at 22:07

## 2019-09-26 RX ADMIN — AMLODIPINE BESYLATE 10 MG: 5 TABLET ORAL at 14:41

## 2019-09-26 RX ADMIN — DESMOPRESSIN ACETATE 40 MG: 0.2 TABLET ORAL at 22:07

## 2019-09-26 RX ADMIN — METOPROLOL TARTRATE 50 MG: 50 TABLET, FILM COATED ORAL at 22:07

## 2019-09-26 RX ADMIN — ONDANSETRON 4 MG: 2 INJECTION INTRAMUSCULAR; INTRAVENOUS at 21:30

## 2019-09-26 RX ADMIN — ALBUTEROL SULFATE 5 MG: 2.5 SOLUTION RESPIRATORY (INHALATION) at 14:47

## 2019-09-26 RX ADMIN — TRIAMTERENE AND HYDROCHLOROTHIAZIDE 2 TABLET: 37.5; 25 TABLET ORAL at 15:32

## 2019-09-26 RX ADMIN — Medication 10 ML: at 22:07

## 2019-09-26 RX ADMIN — HYDRALAZINE HYDROCHLORIDE 10 MG: 20 INJECTION INTRAMUSCULAR; INTRAVENOUS at 18:10

## 2019-09-26 RX ADMIN — IPRATROPIUM BROMIDE AND ALBUTEROL SULFATE 1 AMPULE: .5; 3 SOLUTION RESPIRATORY (INHALATION) at 14:47

## 2019-09-26 RX ADMIN — ACETAMINOPHEN 650 MG: 325 TABLET, FILM COATED ORAL at 22:07

## 2019-09-26 RX ADMIN — LEVOFLOXACIN 500 MG: 500 TABLET, FILM COATED ORAL at 22:07

## 2019-09-26 RX ADMIN — DOXYCYCLINE HYCLATE 100 MG: 100 TABLET, COATED ORAL at 18:55

## 2019-09-26 ASSESSMENT — PAIN SCALES - GENERAL
PAINLEVEL_OUTOF10: 10
PAINLEVEL_OUTOF10: 5
PAINLEVEL_OUTOF10: 10
PAINLEVEL_OUTOF10: 6

## 2019-09-26 ASSESSMENT — PAIN DESCRIPTION - PAIN TYPE
TYPE: ACUTE PAIN

## 2019-09-26 ASSESSMENT — PAIN DESCRIPTION - ORIENTATION
ORIENTATION: LEFT;RIGHT
ORIENTATION: RIGHT;LEFT

## 2019-09-26 ASSESSMENT — ENCOUNTER SYMPTOMS
COUGH: 1
BLOOD IN STOOL: 0
RHINORRHEA: 0
CONSTIPATION: 0
SORE THROAT: 0
SHORTNESS OF BREATH: 0
ABDOMINAL PAIN: 0
VOMITING: 0
NAUSEA: 0
DIARRHEA: 0

## 2019-09-26 ASSESSMENT — PAIN DESCRIPTION - DESCRIPTORS
DESCRIPTORS: HEADACHE
DESCRIPTORS: HEADACHE

## 2019-09-26 ASSESSMENT — PAIN DESCRIPTION - LOCATION
LOCATION: HEAD
LOCATION: HEAD
LOCATION: CHEST

## 2019-09-26 ASSESSMENT — PAIN DESCRIPTION - FREQUENCY: FREQUENCY: INTERMITTENT

## 2019-09-26 NOTE — ED PROVIDER NOTES
of hypertension. He should be on medications but is not compliant with these due to primary care issues. He was treated with antihypertensive agents in the ER with continued hypertension. He has a history of chronic kidney disease. This is stable from prior. No elevation in his troponin. The hospitalist was consulted and is in agreement for admission. This plan was discussed with the patient, and my attending, Oneil Spencer, *, and both are in agreement with the plan. Please see attending note. FINAL IMPRESSION      1. Uncontrolled hypertension    2.  Chronic kidney disease, unspecified CKD stage          DISPOSITION/PLAN   DISPOSITION Decision To Admit 09/26/2019 05:56:31 PM      (Please note that portions of this note were completed with a voice recognition program.  Efforts were made to edit the dictations but occasionally words are mis-transcribed.)    NISHANT Elizabeth CNP (electronically signed)        NISHANT Elizabeth CNP  09/26/19 0617

## 2019-09-26 NOTE — H&P
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 male [id number]  PCP:  Gita Contreras MD (Tel: [phone] )    Chief complaint:    Chief Complaint   Patient presents with    Shortness of Breath     SOB , chest pain started 6 weeks ago. thinks 6 weeks ago had dinner with birds flying around and cough started after this dinner. coughing, wheezing, sob since the dinner and the birds. fever started started 2 days after the dinner and the fever went away. History of Present Illness:  Kristina Welch is a 53 y.o. male is a known case of hypertension noncompliant with therapy is not taking any antihypertensives for the last 2 weeks he presents to us with worsening shortness of breath, cough and chest tightness because of his complaint he came to the ER was noted to uncontrolled blood pressure on presentation was 215/146    Received amlodipine, hydralazine IV, metoprolol p.o., Maxide. blood pressure currently 166 / 108        REVIEW OF SYSTEMS:     10 point ROS was completed and negative unless noted above    Past Medical History:   has a past medical history of Hypertension. Past Surgical History:   has no past surgical history on file. Medications:  Current Facility-Administered Medications on File Prior to Encounter   Medication Dose Route Frequency Provider Last Rate Last Dose    albuterol (PROVENTIL) nebulizer solution 2.5 mg  2.5 mg Nebulization Once NISHANT Zelaya NP         Current Outpatient Medications on File Prior to Encounter   Medication Sig Dispense Refill    albuterol sulfate HFA (PROVENTIL HFA) 108 (90 Base) MCG/ACT inhaler Inhale 2 puffs into the lungs every 4 hours as needed for Wheezing or Shortness of Breath (Space out to every 6 hours as symptoms improve) Space out to every 6 hours as symptoms improve.  1 09/26/2019     BMP:    Lab Results   Component Value Date     09/26/2019    K 3.6 09/26/2019     09/26/2019    CO2 20 09/26/2019    BUN 12 09/26/2019    CREATININE 1.4 09/26/2019    CALCIUM 8.9 09/26/2019    GFRAA >60 09/26/2019    LABGLOM 55 09/26/2019    GLUCOSE 84 09/26/2019     XR CHEST STANDARD (2 VW)   Final Result   Normal chest.         CT CHEST WO CONTRAST    (Results Pending)   NM MYOCARDIAL SPECT REST EXERCISE OR RX    (Results Pending)     Chest Xray: Nothing acute  EKG: Normal sinus rhythm nonspecific ST-T changes present      Problem List  Active Problems:    Chest pain  Resolved Problems:    * No resolved hospital problems. *        Assessment/Plan:     Hypertensive urgency on presentation received p.o. medications IV hydralazine noncompliance will resume home antihypertensive add lisinopril to the regimen add PRN labetalol. Chest pain chest pain pathway has been activated. Serial troponins Lexiscan in the morning    Exertional shortness of breath obtain echo cardisevero Marks to rule out cardia myopathy from uncontrolled blood pressure    Chronic cough for the last 6 weeks get CT of the chest without contrast received oxygen in the ER will give p.o.  Levaquin    Chronic Kidney disease secondary to uncontrolled blood pressure needs outpatient nephrology follow-up    Educated about need for being compliant with blood pressure medication    DVT prophylaxis heparin   Code status Full   Diet cardiac       Alleyjordin Johnson MD    9/26/2019 7:51 PM

## 2019-09-27 VITALS
SYSTOLIC BLOOD PRESSURE: 147 MMHG | WEIGHT: 233.8 LBS | BODY MASS INDEX: 31.67 KG/M2 | OXYGEN SATURATION: 95 % | HEIGHT: 72 IN | DIASTOLIC BLOOD PRESSURE: 98 MMHG | TEMPERATURE: 98 F | HEART RATE: 77 BPM | RESPIRATION RATE: 18 BRPM

## 2019-09-27 PROBLEM — R06.02 SOB (SHORTNESS OF BREATH): Status: ACTIVE | Noted: 2019-09-27

## 2019-09-27 LAB
HCT VFR BLD CALC: 45.4 % (ref 40.5–52.5)
HEMOGLOBIN: 15 G/DL (ref 13.5–17.5)
LEFT VENTRICULAR EJECTION FRACTION HIGH VALUE: 60 %
LEFT VENTRICULAR EJECTION FRACTION MODE: NORMAL
LV EF: 55 %
LV EF: 58 %
LV EF: 66 %
LVEF MODALITY: NORMAL
LVEF MODALITY: NORMAL
MCH RBC QN AUTO: 28.8 PG (ref 26–34)
MCHC RBC AUTO-ENTMCNC: 33 G/DL (ref 31–36)
MCV RBC AUTO: 87.2 FL (ref 80–100)
PDW BLD-RTO: 14.8 % (ref 12.4–15.4)
PLATELET # BLD: 362 K/UL (ref 135–450)
PMV BLD AUTO: 7.9 FL (ref 5–10.5)
RBC # BLD: 5.2 M/UL (ref 4.2–5.9)
TROPONIN: <0.01 NG/ML
WBC # BLD: 5.3 K/UL (ref 4–11)

## 2019-09-27 PROCEDURE — 3430000000 HC RX DIAGNOSTIC RADIOPHARMACEUTICAL: Performed by: INTERNAL MEDICINE

## 2019-09-27 PROCEDURE — 36415 COLL VENOUS BLD VENIPUNCTURE: CPT

## 2019-09-27 PROCEDURE — 85027 COMPLETE CBC AUTOMATED: CPT

## 2019-09-27 PROCEDURE — 2580000003 HC RX 258: Performed by: INTERNAL MEDICINE

## 2019-09-27 PROCEDURE — 84484 ASSAY OF TROPONIN QUANT: CPT

## 2019-09-27 PROCEDURE — 94760 N-INVAS EAR/PLS OXIMETRY 1: CPT

## 2019-09-27 PROCEDURE — 78452 HT MUSCLE IMAGE SPECT MULT: CPT | Performed by: INTERNAL MEDICINE

## 2019-09-27 PROCEDURE — 6360000002 HC RX W HCPCS: Performed by: INTERNAL MEDICINE

## 2019-09-27 PROCEDURE — 93306 TTE W/DOPPLER COMPLETE: CPT

## 2019-09-27 PROCEDURE — A9502 TC99M TETROFOSMIN: HCPCS | Performed by: INTERNAL MEDICINE

## 2019-09-27 PROCEDURE — 6370000000 HC RX 637 (ALT 250 FOR IP): Performed by: NURSE PRACTITIONER

## 2019-09-27 PROCEDURE — 6370000000 HC RX 637 (ALT 250 FOR IP): Performed by: INTERNAL MEDICINE

## 2019-09-27 PROCEDURE — 93017 CV STRESS TEST TRACING ONLY: CPT | Performed by: INTERNAL MEDICINE

## 2019-09-27 PROCEDURE — 6360000004 HC RX CONTRAST MEDICATION: Performed by: INTERNAL MEDICINE

## 2019-09-27 RX ORDER — ACETAMINOPHEN, ASPIRIN AND CAFFEINE 250; 250; 65 MG/1; MG/1; MG/1
1 TABLET, FILM COATED ORAL ONCE
Status: COMPLETED | OUTPATIENT
Start: 2019-09-27 | End: 2019-09-27

## 2019-09-27 RX ORDER — METOPROLOL TARTRATE 50 MG/1
50 TABLET, FILM COATED ORAL 2 TIMES DAILY
Qty: 30 TABLET | Refills: 1 | Status: SHIPPED | OUTPATIENT
Start: 2019-09-27 | End: 2021-06-20

## 2019-09-27 RX ORDER — TRIAMTERENE AND HYDROCHLOROTHIAZIDE 75; 50 MG/1; MG/1
1 TABLET ORAL DAILY
Qty: 30 TABLET | Refills: 1 | Status: SHIPPED | OUTPATIENT
Start: 2019-09-27 | End: 2021-06-20

## 2019-09-27 RX ORDER — LEVOFLOXACIN 500 MG/1
500 TABLET, FILM COATED ORAL DAILY
Qty: 5 TABLET | Refills: 0 | Status: SHIPPED | OUTPATIENT
Start: 2019-09-27 | End: 2019-10-02

## 2019-09-27 RX ADMIN — REGADENOSON 0.4 MG: 0.08 INJECTION, SOLUTION INTRAVENOUS at 08:56

## 2019-09-27 RX ADMIN — Medication 10 ML: at 10:18

## 2019-09-27 RX ADMIN — METOPROLOL TARTRATE 50 MG: 50 TABLET, FILM COATED ORAL at 10:17

## 2019-09-27 RX ADMIN — PERFLUTREN 1.5 MG: 6.52 INJECTION, SUSPENSION INTRAVENOUS at 08:08

## 2019-09-27 RX ADMIN — TRIAMTERENE AND HYDROCHLOROTHIAZIDE 2 TABLET: 37.5; 25 TABLET ORAL at 12:23

## 2019-09-27 RX ADMIN — ASPIRIN 81 MG 81 MG: 81 TABLET ORAL at 10:17

## 2019-09-27 RX ADMIN — TETROFOSMIN 10 MILLICURIE: 1.38 INJECTION, POWDER, LYOPHILIZED, FOR SOLUTION INTRAVENOUS at 07:25

## 2019-09-27 RX ADMIN — LISINOPRIL 10 MG: 10 TABLET ORAL at 10:17

## 2019-09-27 RX ADMIN — ACETAMINOPHEN, ASPIRIN AND CAFFEINE 1 TABLET: 250; 250; 65 TABLET, FILM COATED ORAL at 01:51

## 2019-09-27 RX ADMIN — TETROFOSMIN 30 MILLICURIE: 1.38 INJECTION, POWDER, LYOPHILIZED, FOR SOLUTION INTRAVENOUS at 08:55

## 2019-09-27 ASSESSMENT — PAIN DESCRIPTION - PROGRESSION
CLINICAL_PROGRESSION: RESOLVED

## 2019-09-27 ASSESSMENT — PAIN SCALES - GENERAL
PAINLEVEL_OUTOF10: 10
PAINLEVEL_OUTOF10: 2
PAINLEVEL_OUTOF10: 2
PAINLEVEL_OUTOF10: 10

## 2019-09-27 ASSESSMENT — PAIN DESCRIPTION - LOCATION
LOCATION: HEAD
LOCATION: HEAD

## 2019-09-27 ASSESSMENT — PAIN DESCRIPTION - PAIN TYPE
TYPE: ACUTE PAIN

## 2019-09-27 ASSESSMENT — PAIN DESCRIPTION - DESCRIPTORS: DESCRIPTORS: HEADACHE

## 2019-09-27 ASSESSMENT — PAIN DESCRIPTION - ORIENTATION
ORIENTATION: RIGHT;LEFT
ORIENTATION: RIGHT;LEFT

## 2019-09-27 ASSESSMENT — PAIN DESCRIPTION - FREQUENCY: FREQUENCY: CONTINUOUS

## 2019-09-27 NOTE — PROGRESS NOTES
Pt admitted to room 3312. VSS. Pt A&Ox4. Went over [address] with pt and spouse. Pt v/u. Oriented pt to room and call light. Instructed pt to call out with any needs. Pt independent low fall risk. Bed side table and call light within reach. Will monitor.

## 2019-09-27 NOTE — PROGRESS NOTES
Data- discharge order received, pt verbalized agreement to discharge, disposition to previous residence, no needs for HHC/DME. Action- discharge instructions prepared and given to patient and significant other, pt verbalized understanding. Medication information packet given r/t NEW and/or CHANGED prescriptions emphasizing name/purpose/side effects, pt verbalized understanding. Discharge instruction summary: Diet- general, Activity- as tolerated, Primary Care Physician as follows: Arsenio Crews -[phone] f/u appointment within one week, immunizations reviewed and updated, prescription medications filled by patient. Inpatient surgical procedure precautions reviewed: n/a. Response- Pt belongings gathered, IV removed. Disposition is home (no HHC/DME needs), transported with family, no complications.

## 2019-09-27 NOTE — PLAN OF CARE
Problem: Pain:  Goal: Pain level will decrease  Description  Pain level will decrease  Outcome: Ongoing  Note:   Pt admitted with chest pain. Trops negative x3. Pt currently in stress lab. Pain control as needed. Problem: Respiratory  Goal: O2 Sat > 90%  Outcome: Ongoing  Note:   Pt on room air. No need for supplemental oxygenation.

## 2019-09-27 NOTE — PROGRESS NOTES
Patient instructed on Rodney Protocol Stress Test Procedure including possible side effects and adverse reactions. Verbalizes knowledge and understanding and denies having any questions. EKG from 9/26/19 and pre test reviewed with Dr. Angela Redding. Patient unable to reach target heart on treadmill with Rodney Protocol and unable to go any further or any faster per Patient. Dr. Angela Redding notified and reported dose of Excedrin during night. See new order. Instructed on Lexiscan Stress Test Procedure including possible side effects/ adverse reactions. Patient verbalizes  understanding and denies having any questions . See [address] [place] [address] Cardiology

## 2021-06-20 ENCOUNTER — HOSPITAL ENCOUNTER (INPATIENT)
Age: 46
LOS: 5 days | Discharge: HOME | DRG: 199 | End: 2021-06-25
Attending: EMERGENCY MEDICINE | Admitting: EMERGENCY MEDICINE
Payer: MEDICAID

## 2021-06-20 ENCOUNTER — APPOINTMENT (OUTPATIENT)
Dept: CT IMAGING | Age: 46
DRG: 199 | End: 2021-06-20
Payer: MEDICAID

## 2021-06-20 ENCOUNTER — APPOINTMENT (OUTPATIENT)
Dept: GENERAL RADIOLOGY | Age: 46
DRG: 199 | End: 2021-06-20
Payer: MEDICAID

## 2021-06-20 DIAGNOSIS — R51.9 ACUTE NONINTRACTABLE HEADACHE, UNSPECIFIED HEADACHE TYPE: ICD-10-CM

## 2021-06-20 DIAGNOSIS — I16.0 HYPERTENSIVE URGENCY: Primary | ICD-10-CM

## 2021-06-20 DIAGNOSIS — R42 DIZZINESS: ICD-10-CM

## 2021-06-20 LAB
A/G RATIO: 1.2 (ref 1.1–2.2)
ALBUMIN SERPL-MCNC: 4 G/DL (ref 3.4–5)
ALP BLD-CCNC: 95 U/L (ref 40–129)
ALT SERPL-CCNC: 40 U/L (ref 10–40)
ANION GAP SERPL CALCULATED.3IONS-SCNC: 11 MMOL/L (ref 3–16)
APTT: 36.8 SEC (ref 24.2–36.2)
AST SERPL-CCNC: 22 U/L (ref 15–37)
BASOPHILS ABSOLUTE: 0 K/UL (ref 0–0.2)
BASOPHILS RELATIVE PERCENT: 0.9 %
BILIRUB SERPL-MCNC: 0.5 MG/DL (ref 0–1)
BILIRUBIN URINE: NEGATIVE
BLOOD, URINE: NEGATIVE
BUN BLDV-MCNC: 15 MG/DL (ref 7–20)
CALCIUM SERPL-MCNC: 8.9 MG/DL (ref 8.3–10.6)
CHLORIDE BLD-SCNC: 105 MMOL/L (ref 99–110)
CLARITY: CLEAR
CO2: 23 MMOL/L (ref 21–32)
COLOR: YELLOW
CREAT SERPL-MCNC: 1.5 MG/DL (ref 0.9–1.3)
EOSINOPHILS ABSOLUTE: 0.2 K/UL (ref 0–0.6)
EOSINOPHILS RELATIVE PERCENT: 4.3 %
GFR AFRICAN AMERICAN: >60
GFR NON-AFRICAN AMERICAN: 50
GLOBULIN: 3.4 G/DL
GLUCOSE BLD-MCNC: 102 MG/DL (ref 70–99)
GLUCOSE URINE: NEGATIVE MG/DL
HCT VFR BLD CALC: 47.5 % (ref 40.5–52.5)
HEMOGLOBIN: 15.8 G/DL (ref 13.5–17.5)
INR BLD: 1.01 (ref 0.86–1.14)
KETONES, URINE: NEGATIVE MG/DL
LEUKOCYTE ESTERASE, URINE: NEGATIVE
LYMPHOCYTES ABSOLUTE: 2.3 K/UL (ref 1–5.1)
LYMPHOCYTES RELATIVE PERCENT: 45.3 %
MCH RBC QN AUTO: 28.8 PG (ref 26–34)
MCHC RBC AUTO-ENTMCNC: 33.4 G/DL (ref 31–36)
MCV RBC AUTO: 86.2 FL (ref 80–100)
MICROSCOPIC EXAMINATION: NORMAL
MONOCYTES ABSOLUTE: 0.4 K/UL (ref 0–1.3)
MONOCYTES RELATIVE PERCENT: 8.7 %
NEUTROPHILS ABSOLUTE: 2.1 K/UL (ref 1.7–7.7)
NEUTROPHILS RELATIVE PERCENT: 40.8 %
NITRITE, URINE: NEGATIVE
PDW BLD-RTO: 15 % (ref 12.4–15.4)
PH UA: 7.5 (ref 5–8)
PLATELET # BLD: 335 K/UL (ref 135–450)
PMV BLD AUTO: 8.4 FL (ref 5–10.5)
POTASSIUM REFLEX MAGNESIUM: 3.8 MMOL/L (ref 3.5–5.1)
PRO-BNP: 146 PG/ML (ref 0–124)
PROTEIN UA: NEGATIVE MG/DL
PROTHROMBIN TIME: 11.7 SEC (ref 10–13.2)
RBC # BLD: 5.5 M/UL (ref 4.2–5.9)
SODIUM BLD-SCNC: 139 MMOL/L (ref 136–145)
SPECIFIC GRAVITY UA: >1.03 (ref 1–1.03)
TOTAL PROTEIN: 7.4 G/DL (ref 6.4–8.2)
TROPONIN: <0.01 NG/ML
URINE REFLEX TO CULTURE: NORMAL
URINE TYPE: NORMAL
UROBILINOGEN, URINE: 0.2 E.U./DL
WBC # BLD: 5.1 K/UL (ref 4–11)

## 2021-06-20 PROCEDURE — 84484 ASSAY OF TROPONIN QUANT: CPT

## 2021-06-20 PROCEDURE — 96374 THER/PROPH/DIAG INJ IV PUSH: CPT

## 2021-06-20 PROCEDURE — 2060000000 HC ICU INTERMEDIATE R&B

## 2021-06-20 PROCEDURE — 6370000000 HC RX 637 (ALT 250 FOR IP): Performed by: PHYSICIAN ASSISTANT

## 2021-06-20 PROCEDURE — 6370000000 HC RX 637 (ALT 250 FOR IP): Performed by: INTERNAL MEDICINE

## 2021-06-20 PROCEDURE — 6360000002 HC RX W HCPCS: Performed by: EMERGENCY MEDICINE

## 2021-06-20 PROCEDURE — 93005 ELECTROCARDIOGRAM TRACING: CPT | Performed by: PHYSICIAN ASSISTANT

## 2021-06-20 PROCEDURE — 36415 COLL VENOUS BLD VENIPUNCTURE: CPT

## 2021-06-20 PROCEDURE — 83880 ASSAY OF NATRIURETIC PEPTIDE: CPT

## 2021-06-20 PROCEDURE — 2580000003 HC RX 258: Performed by: INTERNAL MEDICINE

## 2021-06-20 PROCEDURE — 70496 CT ANGIOGRAPHY HEAD: CPT

## 2021-06-20 PROCEDURE — 81003 URINALYSIS AUTO W/O SCOPE: CPT

## 2021-06-20 PROCEDURE — 99283 EMERGENCY DEPT VISIT LOW MDM: CPT

## 2021-06-20 PROCEDURE — 70450 CT HEAD/BRAIN W/O DYE: CPT

## 2021-06-20 PROCEDURE — 6360000004 HC RX CONTRAST MEDICATION: Performed by: PHYSICIAN ASSISTANT

## 2021-06-20 PROCEDURE — 6360000002 HC RX W HCPCS: Performed by: PHYSICIAN ASSISTANT

## 2021-06-20 PROCEDURE — 71045 X-RAY EXAM CHEST 1 VIEW: CPT

## 2021-06-20 PROCEDURE — 6360000002 HC RX W HCPCS: Performed by: INTERNAL MEDICINE

## 2021-06-20 PROCEDURE — 85730 THROMBOPLASTIN TIME PARTIAL: CPT

## 2021-06-20 PROCEDURE — 80053 COMPREHEN METABOLIC PANEL: CPT

## 2021-06-20 PROCEDURE — 85025 COMPLETE CBC W/AUTO DIFF WBC: CPT

## 2021-06-20 PROCEDURE — 96375 TX/PRO/DX INJ NEW DRUG ADDON: CPT

## 2021-06-20 PROCEDURE — 85610 PROTHROMBIN TIME: CPT

## 2021-06-20 RX ORDER — ACETAMINOPHEN 500 MG
1000 TABLET ORAL ONCE
Status: COMPLETED | OUTPATIENT
Start: 2021-06-20 | End: 2021-06-20

## 2021-06-20 RX ORDER — CLONIDINE HYDROCHLORIDE 0.1 MG/1
0.1 TABLET ORAL ONCE
Status: COMPLETED | OUTPATIENT
Start: 2021-06-20 | End: 2021-06-20

## 2021-06-20 RX ORDER — HYDRALAZINE HYDROCHLORIDE 20 MG/ML
10 INJECTION INTRAMUSCULAR; INTRAVENOUS EVERY 6 HOURS PRN
Status: DISCONTINUED | OUTPATIENT
Start: 2021-06-20 | End: 2021-06-21

## 2021-06-20 RX ORDER — DIPHENHYDRAMINE HYDROCHLORIDE 50 MG/ML
25 INJECTION INTRAMUSCULAR; INTRAVENOUS ONCE
Status: COMPLETED | OUTPATIENT
Start: 2021-06-20 | End: 2021-06-20

## 2021-06-20 RX ORDER — SODIUM CHLORIDE 9 MG/ML
25 INJECTION, SOLUTION INTRAVENOUS PRN
Status: DISCONTINUED | OUTPATIENT
Start: 2021-06-20 | End: 2021-06-25 | Stop reason: HOSPADM

## 2021-06-20 RX ORDER — ACETAMINOPHEN 650 MG/1
650 SUPPOSITORY RECTAL EVERY 4 HOURS PRN
Status: DISCONTINUED | OUTPATIENT
Start: 2021-06-20 | End: 2021-06-25 | Stop reason: HOSPADM

## 2021-06-20 RX ORDER — ASPIRIN 81 MG/1
81 TABLET ORAL DAILY
Status: DISCONTINUED | OUTPATIENT
Start: 2021-06-20 | End: 2021-06-25 | Stop reason: HOSPADM

## 2021-06-20 RX ORDER — ACETAMINOPHEN 325 MG/1
650 TABLET ORAL EVERY 4 HOURS PRN
Status: DISCONTINUED | OUTPATIENT
Start: 2021-06-20 | End: 2021-06-25 | Stop reason: HOSPADM

## 2021-06-20 RX ORDER — HYDRALAZINE HYDROCHLORIDE 20 MG/ML
20 INJECTION INTRAMUSCULAR; INTRAVENOUS ONCE
Status: COMPLETED | OUTPATIENT
Start: 2021-06-20 | End: 2021-06-20

## 2021-06-20 RX ORDER — POLYETHYLENE GLYCOL 3350 17 G/17G
17 POWDER, FOR SOLUTION ORAL DAILY PRN
Status: DISCONTINUED | OUTPATIENT
Start: 2021-06-20 | End: 2021-06-25 | Stop reason: HOSPADM

## 2021-06-20 RX ORDER — SODIUM CHLORIDE 0.9 % (FLUSH) 0.9 %
5-40 SYRINGE (ML) INJECTION EVERY 12 HOURS SCHEDULED
Status: DISCONTINUED | OUTPATIENT
Start: 2021-06-20 | End: 2021-06-25 | Stop reason: HOSPADM

## 2021-06-20 RX ORDER — ASPIRIN 300 MG/1
300 SUPPOSITORY RECTAL DAILY
Status: DISCONTINUED | OUTPATIENT
Start: 2021-06-20 | End: 2021-06-25 | Stop reason: HOSPADM

## 2021-06-20 RX ORDER — ATORVASTATIN CALCIUM 40 MG/1
40 TABLET, FILM COATED ORAL NIGHTLY
Status: DISCONTINUED | OUTPATIENT
Start: 2021-06-20 | End: 2021-06-25 | Stop reason: HOSPADM

## 2021-06-20 RX ORDER — SODIUM CHLORIDE 0.9 % (FLUSH) 0.9 %
5-40 SYRINGE (ML) INJECTION PRN
Status: DISCONTINUED | OUTPATIENT
Start: 2021-06-20 | End: 2021-06-25 | Stop reason: HOSPADM

## 2021-06-20 RX ORDER — ONDANSETRON 2 MG/ML
4 INJECTION INTRAMUSCULAR; INTRAVENOUS EVERY 6 HOURS PRN
Status: DISCONTINUED | OUTPATIENT
Start: 2021-06-20 | End: 2021-06-25 | Stop reason: HOSPADM

## 2021-06-20 RX ORDER — HYDRALAZINE HYDROCHLORIDE 25 MG/1
50 TABLET, FILM COATED ORAL EVERY 8 HOURS SCHEDULED
Status: DISCONTINUED | OUTPATIENT
Start: 2021-06-20 | End: 2021-06-25

## 2021-06-20 RX ORDER — DIPHENHYDRAMINE HCL 25 MG
25 TABLET ORAL EVERY 4 HOURS PRN
Status: DISCONTINUED | OUTPATIENT
Start: 2021-06-20 | End: 2021-06-25 | Stop reason: HOSPADM

## 2021-06-20 RX ORDER — ONDANSETRON 4 MG/1
4 TABLET, ORALLY DISINTEGRATING ORAL EVERY 8 HOURS PRN
Status: DISCONTINUED | OUTPATIENT
Start: 2021-06-20 | End: 2021-06-25 | Stop reason: HOSPADM

## 2021-06-20 RX ORDER — METOCLOPRAMIDE HYDROCHLORIDE 5 MG/ML
10 INJECTION INTRAMUSCULAR; INTRAVENOUS ONCE
Status: COMPLETED | OUTPATIENT
Start: 2021-06-20 | End: 2021-06-20

## 2021-06-20 RX ORDER — LOSARTAN POTASSIUM 25 MG/1
50 TABLET ORAL DAILY
Status: DISCONTINUED | OUTPATIENT
Start: 2021-06-20 | End: 2021-06-21

## 2021-06-20 RX ADMIN — CLONIDINE HYDROCHLORIDE 0.1 MG: 0.1 TABLET ORAL at 23:34

## 2021-06-20 RX ADMIN — DIPHENHYDRAMINE HYDROCHLORIDE 25 MG: 50 INJECTION INTRAMUSCULAR; INTRAVENOUS at 11:00

## 2021-06-20 RX ADMIN — ACETAMINOPHEN 1000 MG: 500 TABLET ORAL at 10:26

## 2021-06-20 RX ADMIN — Medication 10 ML: at 21:28

## 2021-06-20 RX ADMIN — IOPAMIDOL 75 ML: 755 INJECTION, SOLUTION INTRAVENOUS at 11:25

## 2021-06-20 RX ADMIN — HYDRALAZINE HYDROCHLORIDE 50 MG: 25 TABLET, FILM COATED ORAL at 16:51

## 2021-06-20 RX ADMIN — ATORVASTATIN CALCIUM 40 MG: 40 TABLET, FILM COATED ORAL at 21:28

## 2021-06-20 RX ADMIN — ASPIRIN 81 MG: 81 TABLET, COATED ORAL at 16:51

## 2021-06-20 RX ADMIN — DIPHENHYDRAMINE HYDROCHLORIDE 25 MG: 25 TABLET ORAL at 22:51

## 2021-06-20 RX ADMIN — METOCLOPRAMIDE 10 MG: 5 INJECTION, SOLUTION INTRAMUSCULAR; INTRAVENOUS at 11:00

## 2021-06-20 RX ADMIN — LOSARTAN POTASSIUM 50 MG: 25 TABLET, FILM COATED ORAL at 16:51

## 2021-06-20 RX ADMIN — ENOXAPARIN SODIUM 40 MG: 40 INJECTION SUBCUTANEOUS at 21:31

## 2021-06-20 RX ADMIN — HYDRALAZINE HYDROCHLORIDE 20 MG: 20 INJECTION INTRAMUSCULAR; INTRAVENOUS at 11:00

## 2021-06-20 RX ADMIN — HYDRALAZINE HYDROCHLORIDE 50 MG: 25 TABLET, FILM COATED ORAL at 21:54

## 2021-06-20 RX ADMIN — HYDRALAZINE HYDROCHLORIDE 10 MG: 20 INJECTION INTRAMUSCULAR; INTRAVENOUS at 21:27

## 2021-06-20 ASSESSMENT — ENCOUNTER SYMPTOMS
CONSTIPATION: 0
SHORTNESS OF BREATH: 0
NAUSEA: 0
BACK PAIN: 0
ABDOMINAL PAIN: 0
COUGH: 0
CHEST TIGHTNESS: 0
VOMITING: 0
DIARRHEA: 0
RESPIRATORY NEGATIVE: 1
PHOTOPHOBIA: 0
COLOR CHANGE: 0

## 2021-06-20 ASSESSMENT — PAIN SCALES - GENERAL
PAINLEVEL_OUTOF10: 0
PAINLEVEL_OUTOF10: 10
PAINLEVEL_OUTOF10: 10
PAINLEVEL_OUTOF10: 0
PAINLEVEL_OUTOF10: 7

## 2021-06-20 ASSESSMENT — PAIN DESCRIPTION - LOCATION
LOCATION: HEAD

## 2021-06-20 ASSESSMENT — PAIN DESCRIPTION - PAIN TYPE
TYPE: ACUTE PAIN

## 2021-06-20 NOTE — H&P
other systems reviewed and negative. PHYSICAL EXAM PERFORMED:    BP (!) 151/102   Pulse 56   Temp 98.9 °F (37.2 °C) (Oral)   Resp 24   Ht 6' (1.829 m)   Wt 235 lb (106.6 kg)   SpO2 97%   BMI 31.87 kg/m²     General appearance:  No apparent distress, appears stated age and cooperative. HEENT:  Normal cephalic, atraumatic without obvious deformity. Pupils equal, round, and reactive to light. Extra ocular muscles intact. Conjunctivae/corneas clear. Neck: Supple, with full range of motion. No jugular venous distention. Trachea midline. Respiratory:  Normal respiratory effort. Clear to auscultation, bilaterally without Rales/Wheezes/Rhonchi. Cardiovascular:  Regular rate and rhythm with normal S1/S2 without murmurs, rubs or gallops. Abdomen: Soft, non-tender, non-distended with normal bowel sounds. Musculoskeletal:  No clubbing, cyanosis or edema bilaterally. Full range of motion without deformity. Skin: Skin color, texture, turgor normal.  No rashes or lesions. Neurologic:  Neurovascularly intact without any focal sensory/motor deficits.  Cranial nerves: II-XII intact, grossly non-focal.  Psychiatric:  Alert and oriented, thought content appropriate, normal insight  Capillary Refill: Brisk,< 3 seconds   Peripheral Pulses: +2 palpable, equal bilaterally       Labs:     Recent Labs     06/20/21  1023   WBC 5.1   HGB 15.8   HCT 47.5        Recent Labs     06/20/21  1023      K 3.8      CO2 23   BUN 15   CREATININE 1.5*   CALCIUM 8.9     Recent Labs     06/20/21  1023   AST 22   ALT 40   BILITOT 0.5   ALKPHOS 95     Recent Labs     06/20/21  1023   INR 1.01     Recent Labs     06/20/21  1023   TROPONINI <0.01       Urinalysis:      Lab Results   Component Value Date    NITRU Negative 09/25/2018    WBCUA 1 01/03/2018    RBCUA 1 01/03/2018    BLOODU Negative 09/25/2018    SPECGRAV 1.028 09/25/2018    GLUCOSEU Negative 09/25/2018       Radiology:     CXR: I have reviewed the CXR with the following interpretation: No acute cardiopulmonary process  EKG:  I have reviewed the EKG with the following interpretation: Sinus bradycardia with rate of 52 and nonspecific T wave abnormality    CTA HEAD NECK W CONTRAST   Final Result   1. Severe short-segment stenosis at the origin of the left vertebral artery. 2. Otherwise, no flow limiting stenosis of the cervical carotid/vertebral   arteries. 3. No focal stenosis of the [id number]. CT HEAD WO CONTRAST   Final Result   No acute intracranial abnormality.          XR CHEST PORTABLE   Final Result   No significant findings in the chest.             ASSESSMENT:    Active Hospital Problems    Diagnosis Date Noted    Hypertensive urgency [I16.0] 06/20/2021         PLAN:    Hypertensive urgency  Avoid decreasing blood pressure by more than 25%  We will start patient on losartan 50 mg daily and titrate as needed  Will avoid metoprolol at this time in view of bradycardia  We will give scheduled p.o. hydralazine with extra IV hydralazine as needed for SBP over 180    Vertigo  Rule out TIA/CVA  No abnormality on CT imaging  Follow-up MRI brain  PT OT    CKD  Creatinine appears to be at baseline    Severe vertebral artery stenosis on CTA head neck  We will obtain vascular surgery consultation for further recommendations    DVT Prophylaxis: Lovenox  Diet: No diet orders on file  Code Status: Prior      Electronically signed by Elliot Rosenbaum MD on 6/20/2021 at 12:54 PM

## 2021-06-20 NOTE — ED NOTES
Report to nurse at bedside.   Up to room per [facility]     Aye Medina RN  06/20/21 0398

## 2021-06-20 NOTE — PROGRESS NOTES
Pharmacy Medication History Note      Consulted to complete a medication reconciliation by Dr. Adriana Richards. Unable to find any evidence of the patient taking prescribed medications in any of the available fill history databases. Other notes (ex. Recent course of antibiotics, Coumadin dosing):  Please discuss home medications with patient to assess for use of other OTC or herbal medications.     Thanks,  Roberto Flores, PharmD  PGY-1 Pharmacy Resident  [id number]

## 2021-06-20 NOTE — ED PROVIDER NOTES
[address] ENCOUNTER        Pt Name: Pili Cross  MRN: [id number]  [place] 1975  Date of evaluation: 6/20/2021  Provider: NICK Perez  PCP: Brandon Abreu MD  Note Started: 12:16 PM EDT        I have seen and evaluated this patient with my supervising physician Chelsea Carreon, *. CHIEF COMPLAINT       Chief Complaint   Patient presents with    Headache     Patient presents with headache and dizziness that started 2 days ago. Ran out of his BP medications 2 weeks ago. Insurance changed and unable to find PCP yet. HISTORY OF PRESENT ILLNESS   (Location, Timing/Onset, Context/Setting, Quality, Duration, Modifying Factors, Severity, Associated Signs and Symptoms)  Note limiting factors. Pili Cross is a 50 y.o. male with past medical history of hypertension who presents to the ED with complaint of elevated blood pressure, headache and dizziness. Patient states he ran of his blood pressure medications about a month ago. Patient states he recently changed insurance and unable to get into PCP. States he was on losartan and metoprolol for his blood pressure but unsure of the dosages. Patient states 2 to 3 days ago he started out being frontal headache that he rates as a 10/10 with associated dizziness. States dizziness described as off-balance and room spinning sensation worsened with changes in positioning. Denies any visual changes including blurry vision or double vision. Denies any speech disturbances, numbness/tingling, lightheadedness, chest pain or shortness of breath. Denies abdominal pain, nausea/vomiting, urinary symptoms or changes in bowel movements. Denies any fever or chills. Denies rashes or lesions. Denies any injury or trauma. Patient denies significant history of headaches in the past.  Patient became concerned and came to the ED for further evaluation treatment.   Has been taking over-the-counter Excedrin with most recent dose yesterday with moderate improvement of symptoms. Nursing Notes were all reviewed and agreed with or any disagreements were addressed in the HPI. REVIEW OF SYSTEMS    (2-9 systems for level 4, 10 or more for level 5)     Review of Systems   Constitutional: Negative for activity change, appetite change, chills and fever. Eyes: Negative for photophobia and visual disturbance. Respiratory: Negative. Negative for cough, chest tightness and shortness of breath. Cardiovascular: Negative. Negative for chest pain, palpitations and leg swelling. Gastrointestinal: Negative for abdominal pain, constipation, diarrhea, nausea and vomiting. Genitourinary: Negative for decreased urine volume, difficulty urinating, dysuria, flank pain, frequency, hematuria and urgency. Musculoskeletal: Negative for arthralgias, back pain, myalgias, neck pain and neck stiffness. Skin: Negative for color change, pallor, rash and wound. Neurological: Positive for dizziness and headaches. Negative for tremors, seizures, syncope, facial asymmetry, speech difficulty, weakness, light-headedness and numbness. Positives and Pertinent negatives as per HPI. Except as noted above in the ROS, all other systems were reviewed and negative. PAST MEDICAL HISTORY     Past Medical History:   Diagnosis Date    Hypertension          SURGICAL HISTORY   History reviewed. No pertinent surgical history. CURRENTMEDICATIONS       Previous Medications    No medications on file         ALLERGIES     Amlodipine, Codeine, Lisinopril, and Other    FAMILYHISTORY     History reviewed. No pertinent family history.        SOCIAL HISTORY       Social History     Tobacco Use    Smoking status: Never Smoker    Smokeless tobacco: Never Used   Vaping Use    Vaping Use: Never used   Substance Use Topics    Alcohol use: No    Drug use: No       SCREENINGS             PHYSICAL EXAM    (up to 7 for level 4, 8 or more for level 5)     ED Triage Vitals [06/20/21 1008]   BP Temp Temp Source Pulse Resp SpO2 Height Weight   (!) 213/122 98.9 °F (37.2 °C) Oral 57 18 96 % 6' (1.829 m) 235 lb (106.6 kg)       Physical Exam  Constitutional:       General: He is not in acute distress. Appearance: Normal appearance. He is well-developed. He is not ill-appearing, toxic-appearing or diaphoretic. HENT:      Head: Normocephalic and atraumatic. Comments: No temporal artery tenderness bilaterally. Right Ear: External ear normal.      Left Ear: External ear normal.   Eyes:      General:         Right eye: No discharge. Left eye: No discharge. Extraocular Movements: Extraocular movements intact. Conjunctiva/sclera: Conjunctivae normal.      Pupils: Pupils are equal, round, and reactive to light. Cardiovascular:      Rate and Rhythm: Normal rate and regular rhythm. Pulses: Normal pulses. Heart sounds: Normal heart sounds. No murmur heard. No friction rub. No gallop. Comments: 2+ radial pulses bilaterally. No pedal edema. No calf tenderness. No JVD. Pulmonary:      Effort: Pulmonary effort is normal. No respiratory distress. Breath sounds: Normal breath sounds. No stridor. No wheezing, rhonchi or rales. Chest:      Chest wall: No tenderness. Abdominal:      General: Abdomen is flat. Bowel sounds are normal. There is no distension. Palpations: Abdomen is soft. There is no mass. Tenderness: There is no abdominal tenderness. There is no right CVA tenderness, left CVA tenderness, guarding or rebound. Hernia: No hernia is present. Musculoskeletal:         General: Normal range of motion. Cervical back: Normal range of motion and neck supple. Skin:     General: Skin is warm and dry. Coloration: Skin is not pale. Findings: No erythema or rash. Neurological:      Mental Status: He is alert and oriented to person, place, and time.       GCS: GCS eye subscore is 4. GCS verbal subscore is 5. GCS motor subscore is 6. Cranial Nerves: Cranial nerves are intact. No cranial nerve deficit, dysarthria or facial asymmetry. Sensory: Sensation is intact. No sensory deficit. Motor: Motor function is intact. Coordination: Coordination is intact. Finger-Nose-Finger Test and Heel to X [facility] Test normal.      Gait: Gait is intact. Gait normal.      Comments: Alert and oriented x3. Cranial nerves II through XII intact. Speech is clear. There is no facial droop. Sensation intact light touch the upper and lower extremities throughout. Full range of motion strength to the upper and lower extremities throughout. There is no focal deficit or weakness. Patient has normal finger-to-nose. Normal heel-to-shin. Patient able to ambulate without any ataxia here in the emergency department. Negative test of skew. EOMs intact. PERRLA.    Psychiatric:         Behavior: Behavior normal.         DIAGNOSTIC RESULTS   LABS:    Labs Reviewed   COMPREHENSIVE METABOLIC PANEL W/ REFLEX TO MG FOR LOW K - Abnormal; Notable for the following components:       Result Value    Glucose 102 (*)     CREATININE 1.5 (*)     GFR Non- 50 (*)     All other components within normal limits    Narrative:     Performed at:  [facility] [address], [place], [address] [facility]   Phone [phone]   BRAIN NATRIURETIC PEPTIDE - Abnormal; Notable for the following components:    Pro- (*)     All other components within normal limits    Narrative:     Performed at:  [facility]  [address] [address] [facility]   Phone [phone]   APTT - Abnormal; Notable for the following components:    aPTT 36.8 (*)     All other components within normal limits    Narrative:     Performed at:  [facility] [address], [place], [facility]   Phone [phone]   CBC \"code stroke\" or \"stroke alert\" been called? ->No Reason for exam:->diizy - htn - HA Decision Support Exception - unselect if not a suspected or confirmed emergency medical condition->Emergency Medical Condition (MA) Reason for Exam: Headache (Patient presents with headache and dizziness that started 2 days ago. Ran out of his BP medications 2 weeks ago. Insurance changed and unable to find PCP yet. ) Acuity: Acute Type of Exam: Initial FINDINGS: BRAIN/VENTRICLES: There is no acute intracranial hemorrhage, mass effect or midline shift. No abnormal extra-axial fluid collection. The gray-white differentiation is maintained without evidence of an acute infarct. There is no evidence of hydrocephalus. ORBITS: The visualized portion of the orbits demonstrate no acute abnormality. SINUSES: Retention cyst right maxillary sinus. SOFT TISSUES/SKULL:  No acute abnormality of the visualized skull or soft tissues. No acute intracranial abnormality. XR CHEST PORTABLE    Result Date: 6/20/2021  EXAMINATION: ONE XRAY VIEW OF THE CHEST 6/20/2021 10:21 am COMPARISON: 09/26/2019 radiograph HISTORY: ORDERING SYSTEM PROVIDED HISTORY: htn TECHNOLOGIST PROVIDED HISTORY: Reason for exam:->htn Reason for Exam: hypertension Acuity: Acute Type of Exam: Initial FINDINGS: The heart, mediastinum and pulmonary vascularity are normal.  Lungs are well-expanded and clear. No skeletal abnormalities are present in the chest.     No significant findings in the chest.     CTA HEAD NECK W CONTRAST    Result Date: 6/20/2021  EXAMINATION: CTA OF THE HEAD AND NECK WITH CONTRAST 6/20/2021 11:08 am: TECHNIQUE: CTA of the head and neck was performed with the administration of intravenous contrast. Multiplanar reformatted images are provided for review. MIP images are provided for review. Stenosis of the internal carotid arteries measured using NASCET criteria.  Dose modulation, iterative reconstruction, and/or weight based adjustment of the mA/kV was utilized to reduce the radiation dose to as low as reasonably achievable. COMPARISON: None. HISTORY: ORDERING SYSTEM PROVIDED HISTORY: dizzy - HA - htn TECHNOLOGIST PROVIDED HISTORY: Reason for exam:->dizzy - HA - htn Decision Support Exception - unselect if not a suspected or confirmed emergency medical condition->Emergency Medical Condition (MA) Reason for Exam: Headache (Patient presents with headache and dizziness that started 2 days ago. Ran out of his BP medications 2 weeks ago. Insurance changed and unable to find PCP yet. ) Acuity: Acute Type of Exam: Initial FINDINGS: CTA NECK: AORTIC ARCH/ARCH VESSELS: Incidentally noted is a common origin of the innominate and right common carotid arteries, which is a normal variant. No dissection or arterial injury. No significant stenosis of the brachiocephalic or subclavian arteries. CAROTID ARTERIES: No dissection, arterial injury, or hemodynamically significant stenosis by NASCET criteria. VERTEBRAL ARTERIES: There is severe short-segment stenosis at the origin of the left vertebral artery (series 601, image 89). No focal stenosis otherwise seen of the vertebral arteries. SOFT TISSUES: No focal consolidation within the visualized lung apices. No acute abnormality within the visualized superior mediastinum. BONES: No acute osseous abnormality. CTA HEAD: ANTERIOR CIRCULATION: No significant stenosis of the intracranial internal carotid, anterior cerebral, or middle cerebral arteries. No aneurysm. POSTERIOR CIRCULATION: No significant stenosis of the vertebral, basilar, or posterior cerebral arteries. No aneurysm. OTHER: No dural venous sinus thrombosis on this non-dedicated study. BRAIN: No mass effect or midline shift. 1. Severe short-segment stenosis at the origin of the left vertebral artery. 2. Otherwise, no flow limiting stenosis of the cervical carotid/vertebral arteries. 3. No focal stenosis of the [id number]-Xcrxuv.            PROCEDURES   Unless examination patient's #1 hypertensive urgency/emergency with associated headache and dizziness. Upon repeat evaluation patient's blood pressure improved to 150/115. Patient states symptoms have improved but still has slight headache and feelings of dizziness. Given patient's continued elevated blood pressure believe would benefit from admission for further blood pressure control and evaluation. Case will be discussed with hospital service. FINAL IMPRESSION      1. Hypertensive urgency    2. Acute nonintractable headache, unspecified headache type    3. Dizziness          DISPOSITION/PLAN   DISPOSITION Decision To Admit 06/20/2021 12:12:28 PM      PATIENT REFERRED TO:  No follow-up provider specified. DISCHARGE MEDICATIONS:  New Prescriptions    No medications on file       DISCONTINUED MEDICATIONS:  Discontinued Medications    ALBUTEROL SULFATE HFA (PROVENTIL HFA) 108 (90 BASE) MCG/ACT INHALER    Inhale 2 puffs into the lungs every 4 hours as needed for Wheezing or Shortness of Breath (Space out to every 6 hours as symptoms improve) Space out to every 6 hours as symptoms improve.     METOPROLOL TARTRATE (LOPRESSOR) 50 MG TABLET    Take 1 tablet by mouth 2 times daily    TRIAMTERENE-HYDROCHLOROTHIAZIDE (MAXZIDE) 75-50 MG PER TABLET    Take 1 tablet by mouth daily              (Please note that portions of this note were completed with a voice recognition program.  Efforts were made to edit the dictations but occasionally words are mis-transcribed.)    NICK Limon (electronically signed)          NICK Mujica  06/20/21 [id number]

## 2021-06-20 NOTE — ED PROVIDER NOTES
[facility] Emergency Department      Pt Name: Luz Marina Hayden  MRN: [id number]  [place] 1975  Date of evaluation: 6/20/2021  Provider: Erasmo Valle MD  I independently performed a history and physical on Luz Marina Hayden. All diagnostic, treatment, and disposition decisions were made by myself in conjunction with the advanced practice provider. HPI: Luz Marina Hayden presented with   Chief Complaint   Patient presents with    Headache     Patient presents with headache and dizziness that started 2 days ago. Ran out of his BP medications 2 weeks ago. Insurance changed and unable to find PCP yet. Luz Marina Hayden has a past medical history of Hypertension. He has no past surgical history on file. Current Facility-Administered Medications on File Prior to Encounter   Medication Dose Route Frequency Provider Last Rate Last Admin    albuterol (PROVENTIL) nebulizer solution 2.5 mg  2.5 mg Nebulization Once NISHANT Trujillo NP         No current outpatient medications on file prior to encounter. PHYSICAL EXAM  Vitals: BP (!) 213/122   Pulse 57   Temp 98.9 °F (37.2 °C) (Oral)   Resp 18   Ht 6' (1.829 m)   Wt 235 lb (106.6 kg)   SpO2 96%   BMI 31.87 kg/m²   Constitutional:  50 y.o. male alert, cooperative  HENT:  Atraumatic scalp, mucous membranes moist  Eyes:   Conjunctiva clear, no icterus  Neck:  Supple, no visible JVD, no signs of injury  Cardiovascular:  Regular, no rubs  Thorax & Lungs:  No accessory muscle usage, clear  Abdomen:  Soft, non distended, NT  Back:  No deformity  Genitalia:  Deferred  Rectal:  Deferred  Extremities:  No cyanosis, no edema, adequate perfusion  Skin:  Warm, dry  Neurologic:  Alert, no slurred speech, normal coordination, light touch sensation intact, NIH 0, can ambulate  Psychiatric:  Affect appropriate    Medical Decision Making and Plan:  Briefly, this is an 50 y.o.male who presented with headache, dizziness for 2 to 3 days.   Out of BP medicines within the 2. Otherwise, no flow limiting stenosis of the cervical carotid/vertebral   arteries. 3. No focal stenosis of the [id number]. CT HEAD WO CONTRAST   Final Result   No acute intracranial abnormality. XR CHEST PORTABLE   Final Result   No significant findings in the chest.           EKG:  Read by me in the absence of a cardiologist shows: Sinus bradycardia, rate 53, QRS duration normal, axis XX 9 degrees, nonspecific ST-T wave abnormality, minimal change when compared to September 2019    CRITICAL CARE:   Total critical care time of 31 minutes (excludes any time for procedures). This includes but not limited to vital sign monitoring, medication, clinical response to medications, interpretation of diagnostics, review of nursing notes, pertinent record review, discussions about patient condition, consultation time, documentation time. Critical care due to the patient's hypertensive urgency. Vitals:    06/20/21 1045 06/20/21 1100 06/20/21 1211 06/20/21 1215   BP: (!) 183/117 (!) 184/124 (!) 150/115 (!) 151/102   Pulse: 53 53  56   Resp: 15 17  24   Temp:       TempSrc:       SpO2: 98% 97%  97%   Weight:       Height:         Medications administered:  Medications   acetaminophen (TYLENOL) tablet 1,000 mg (1,000 mg Oral Given 6/20/21 1026)   metoclopramide (REGLAN) injection 10 mg (10 mg Intravenous Given 6/20/21 1100)   diphenhydrAMINE (BENADRYL) injection 25 mg (25 mg Intravenous Given 6/20/21 1100)   iopamidol (ISOVUE-370) 76 % injection 75 mL (75 mLs Intravenous Given 6/20/21 1125)   hydrALAZINE (APRESOLINE) injection 20 mg (20 mg Intravenous Given 6/20/21 1100)     FINAL IMPRESSION:    1. Hypertensive urgency    2. Acute nonintractable headache, unspecified headache type    3.  Dizziness       DISPOSITION Decision To Admit 06/20/2021 12:12:28 PM       Brittany Bradley MD  06/20/21 5893

## 2021-06-20 NOTE — ED NOTES
Bed: 12  Expected date: 6/20/21  Expected time:   Means of arrival:   Comments:  Adriane Sanchez RN  06/20/21 2430

## 2021-06-20 NOTE — FLOWSHEET NOTE
Notified MD \"Pt here for hypertensive urgency. Has HA and wants something stronger than tyelenol, states it is not working. Explained with HTN he will have HA. they still want something for pain. Has hydralazine 50mg PO q 8hrs and 10mg IVP for SBP over 160. Pt BP was 163/99 with 50mg hydralazine and 171/99 an hour later. Please advise thanks. \"

## 2021-06-21 ENCOUNTER — APPOINTMENT (OUTPATIENT)
Dept: MRI IMAGING | Age: 46
DRG: 199 | End: 2021-06-21
Payer: MEDICAID

## 2021-06-21 LAB
ANION GAP SERPL CALCULATED.3IONS-SCNC: 11 MMOL/L (ref 3–16)
BUN BLDV-MCNC: 16 MG/DL (ref 7–20)
CALCIUM SERPL-MCNC: 9.6 MG/DL (ref 8.3–10.6)
CHLORIDE BLD-SCNC: 104 MMOL/L (ref 99–110)
CHOLESTEROL, TOTAL: 199 MG/DL (ref 0–199)
CO2: 21 MMOL/L (ref 21–32)
CREAT SERPL-MCNC: 1.3 MG/DL (ref 0.9–1.3)
GFR AFRICAN AMERICAN: >60
GFR NON-AFRICAN AMERICAN: 59
GLUCOSE BLD-MCNC: 120 MG/DL (ref 70–99)
HCT VFR BLD CALC: 47.7 % (ref 40.5–52.5)
HDLC SERPL-MCNC: 47 MG/DL (ref 40–60)
HEMOGLOBIN: 16 G/DL (ref 13.5–17.5)
LDL CHOLESTEROL CALCULATED: 139 MG/DL
MCH RBC QN AUTO: 28.9 PG (ref 26–34)
MCHC RBC AUTO-ENTMCNC: 33.5 G/DL (ref 31–36)
MCV RBC AUTO: 86.1 FL (ref 80–100)
PDW BLD-RTO: 14.9 % (ref 12.4–15.4)
PLATELET # BLD: 325 K/UL (ref 135–450)
PMV BLD AUTO: 8.9 FL (ref 5–10.5)
POTASSIUM SERPL-SCNC: 4.2 MMOL/L (ref 3.5–5.1)
RBC # BLD: 5.55 M/UL (ref 4.2–5.9)
SODIUM BLD-SCNC: 136 MMOL/L (ref 136–145)
TRIGL SERPL-MCNC: 67 MG/DL (ref 0–150)
VLDLC SERPL CALC-MCNC: 13 MG/DL
WBC # BLD: 6.1 K/UL (ref 4–11)

## 2021-06-21 PROCEDURE — 6370000000 HC RX 637 (ALT 250 FOR IP): Performed by: INTERNAL MEDICINE

## 2021-06-21 PROCEDURE — 2580000003 HC RX 258: Performed by: INTERNAL MEDICINE

## 2021-06-21 PROCEDURE — 92526 ORAL FUNCTION THERAPY: CPT

## 2021-06-21 PROCEDURE — 6360000002 HC RX W HCPCS: Performed by: INTERNAL MEDICINE

## 2021-06-21 PROCEDURE — 80048 BASIC METABOLIC PNL TOTAL CA: CPT

## 2021-06-21 PROCEDURE — 94760 N-INVAS EAR/PLS OXIMETRY 1: CPT

## 2021-06-21 PROCEDURE — 2100000000 HC CCU R&B

## 2021-06-21 PROCEDURE — 80061 LIPID PANEL: CPT

## 2021-06-21 PROCEDURE — 99254 IP/OBS CNSLTJ NEW/EST MOD 60: CPT | Performed by: PSYCHIATRY & NEUROLOGY

## 2021-06-21 PROCEDURE — 85027 COMPLETE CBC AUTOMATED: CPT

## 2021-06-21 PROCEDURE — 2500000003 HC RX 250 WO HCPCS: Performed by: INTERNAL MEDICINE

## 2021-06-21 PROCEDURE — 6360000002 HC RX W HCPCS: Performed by: PHYSICIAN ASSISTANT

## 2021-06-21 PROCEDURE — 70551 MRI BRAIN STEM W/O DYE: CPT

## 2021-06-21 PROCEDURE — 99253 IP/OBS CNSLTJ NEW/EST LOW 45: CPT | Performed by: SURGERY

## 2021-06-21 PROCEDURE — 36415 COLL VENOUS BLD VENIPUNCTURE: CPT

## 2021-06-21 PROCEDURE — 83036 HEMOGLOBIN GLYCOSYLATED A1C: CPT

## 2021-06-21 PROCEDURE — 92610 EVALUATE SWALLOWING FUNCTION: CPT

## 2021-06-21 PROCEDURE — 2000000000 HC ICU R&B

## 2021-06-21 RX ORDER — NITROGLYCERIN 20 MG/100ML
5-200 INJECTION INTRAVENOUS CONTINUOUS
Status: DISCONTINUED | OUTPATIENT
Start: 2021-06-21 | End: 2021-06-24

## 2021-06-21 RX ORDER — CLONIDINE HYDROCHLORIDE 0.1 MG/1
0.1 TABLET ORAL 2 TIMES DAILY
Status: DISCONTINUED | OUTPATIENT
Start: 2021-06-21 | End: 2021-06-22

## 2021-06-21 RX ORDER — HYDRALAZINE HYDROCHLORIDE 20 MG/ML
20 INJECTION INTRAMUSCULAR; INTRAVENOUS EVERY 4 HOURS PRN
Status: DISCONTINUED | OUTPATIENT
Start: 2021-06-21 | End: 2021-06-21

## 2021-06-21 RX ORDER — HYDROMORPHONE HYDROCHLORIDE 1 MG/ML
0.5 INJECTION, SOLUTION INTRAMUSCULAR; INTRAVENOUS; SUBCUTANEOUS EVERY 4 HOURS PRN
Status: DISCONTINUED | OUTPATIENT
Start: 2021-06-21 | End: 2021-06-25 | Stop reason: HOSPADM

## 2021-06-21 RX ORDER — KETOROLAC TROMETHAMINE 15 MG/ML
15 INJECTION, SOLUTION INTRAMUSCULAR; INTRAVENOUS ONCE
Status: COMPLETED | OUTPATIENT
Start: 2021-06-21 | End: 2021-06-21

## 2021-06-21 RX ORDER — MORPHINE SULFATE 4 MG/ML
4 INJECTION, SOLUTION INTRAMUSCULAR; INTRAVENOUS ONCE
Status: COMPLETED | OUTPATIENT
Start: 2021-06-21 | End: 2021-06-21

## 2021-06-21 RX ORDER — LOSARTAN POTASSIUM 100 MG/1
100 TABLET ORAL DAILY
Status: DISCONTINUED | OUTPATIENT
Start: 2021-06-21 | End: 2021-06-24

## 2021-06-21 RX ORDER — HYDRALAZINE HYDROCHLORIDE 20 MG/ML
20 INJECTION INTRAMUSCULAR; INTRAVENOUS EVERY 4 HOURS PRN
Status: DISCONTINUED | OUTPATIENT
Start: 2021-06-21 | End: 2021-06-22

## 2021-06-21 RX ADMIN — KETOROLAC TROMETHAMINE 15 MG: 15 INJECTION, SOLUTION INTRAMUSCULAR; INTRAVENOUS at 18:46

## 2021-06-21 RX ADMIN — ASPIRIN 81 MG: 81 TABLET, COATED ORAL at 09:35

## 2021-06-21 RX ADMIN — MORPHINE SULFATE 4 MG: 4 INJECTION, SOLUTION INTRAMUSCULAR; INTRAVENOUS at 05:26

## 2021-06-21 RX ADMIN — ENOXAPARIN SODIUM 40 MG: 40 INJECTION SUBCUTANEOUS at 19:57

## 2021-06-21 RX ADMIN — NITROGLYCERIN 5 MCG/MIN: 20 INJECTION INTRAVENOUS at 16:03

## 2021-06-21 RX ADMIN — ONDANSETRON 4 MG: 2 INJECTION INTRAMUSCULAR; INTRAVENOUS at 14:57

## 2021-06-21 RX ADMIN — HYDROMORPHONE HYDROCHLORIDE 0.5 MG: 1 INJECTION, SOLUTION INTRAMUSCULAR; INTRAVENOUS; SUBCUTANEOUS at 19:57

## 2021-06-21 RX ADMIN — MORPHINE SULFATE 4 MG: 4 INJECTION, SOLUTION INTRAMUSCULAR; INTRAVENOUS at 01:17

## 2021-06-21 RX ADMIN — HYDROMORPHONE HYDROCHLORIDE 0.5 MG: 1 INJECTION, SOLUTION INTRAMUSCULAR; INTRAVENOUS; SUBCUTANEOUS at 10:16

## 2021-06-21 RX ADMIN — CLONIDINE HYDROCHLORIDE 0.1 MG: 0.1 TABLET ORAL at 19:57

## 2021-06-21 RX ADMIN — Medication 10 ML: at 09:35

## 2021-06-21 RX ADMIN — CLONIDINE HYDROCHLORIDE 0.1 MG: 0.1 TABLET ORAL at 09:35

## 2021-06-21 RX ADMIN — HYDRALAZINE HYDROCHLORIDE 20 MG: 20 INJECTION INTRAMUSCULAR; INTRAVENOUS at 11:40

## 2021-06-21 RX ADMIN — Medication 10 ML: at 11:40

## 2021-06-21 RX ADMIN — HYDROMORPHONE HYDROCHLORIDE 0.5 MG: 1 INJECTION, SOLUTION INTRAMUSCULAR; INTRAVENOUS; SUBCUTANEOUS at 15:01

## 2021-06-21 RX ADMIN — HYDRALAZINE HYDROCHLORIDE 50 MG: 25 TABLET, FILM COATED ORAL at 13:52

## 2021-06-21 RX ADMIN — LOSARTAN POTASSIUM 100 MG: 100 TABLET, FILM COATED ORAL at 09:35

## 2021-06-21 RX ADMIN — Medication 10 ML: at 15:01

## 2021-06-21 RX ADMIN — HYDRALAZINE HYDROCHLORIDE 20 MG: 20 INJECTION INTRAMUSCULAR; INTRAVENOUS at 15:40

## 2021-06-21 RX ADMIN — Medication 10 ML: at 19:58

## 2021-06-21 RX ADMIN — HYDRALAZINE HYDROCHLORIDE 50 MG: 25 TABLET, FILM COATED ORAL at 23:09

## 2021-06-21 RX ADMIN — HYDRALAZINE HYDROCHLORIDE 50 MG: 25 TABLET, FILM COATED ORAL at 05:26

## 2021-06-21 ASSESSMENT — PAIN DESCRIPTION - FREQUENCY
FREQUENCY: CONTINUOUS

## 2021-06-21 ASSESSMENT — PAIN DESCRIPTION - LOCATION
LOCATION: HEAD

## 2021-06-21 ASSESSMENT — PAIN SCALES - GENERAL
PAINLEVEL_OUTOF10: 7
PAINLEVEL_OUTOF10: 10
PAINLEVEL_OUTOF10: 0
PAINLEVEL_OUTOF10: 10
PAINLEVEL_OUTOF10: 7
PAINLEVEL_OUTOF10: 10
PAINLEVEL_OUTOF10: 8
PAINLEVEL_OUTOF10: 10

## 2021-06-21 ASSESSMENT — PAIN DESCRIPTION - PAIN TYPE
TYPE: ACUTE PAIN

## 2021-06-21 ASSESSMENT — PAIN DESCRIPTION - DESCRIPTORS
DESCRIPTORS: CONSTANT;HEADACHE
DESCRIPTORS: CONSTANT;HEADACHE
DESCRIPTORS: CONSTANT

## 2021-06-21 ASSESSMENT — PAIN DESCRIPTION - ONSET
ONSET: ON-GOING

## 2021-06-21 ASSESSMENT — PAIN DESCRIPTION - ORIENTATION: ORIENTATION: UPPER;POSTERIOR

## 2021-06-21 ASSESSMENT — PAIN DESCRIPTION - PROGRESSION: CLINICAL_PROGRESSION: NOT CHANGED

## 2021-06-21 ASSESSMENT — PAIN DESCRIPTION - DIRECTION: RADIATING_TOWARDS: NECK, BACK

## 2021-06-21 NOTE — PROGRESS NOTES
Resp (P) 18   Ht 6' (1.829 m)   Wt 236 lb 12.8 oz (107.4 kg)   SpO2 (P) 98%   BMI 32.12 kg/m²     General appearance: No apparent distress, appears stated age and cooperative. HEENT: Pupils equal, round, and reactive to light. Conjunctivae/corneas clear. Neck: Supple, with full range of motion. No jugular venous distention. Trachea midline. Respiratory:  Normal respiratory effort. Clear to auscultation, bilaterally without Rales/Wheezes/Rhonchi. Cardiovascular: Regular rate and rhythm with normal S1/S2 without murmurs, rubs or gallops. Abdomen: Soft, non-tender, non-distended with normal bowel sounds. Musculoskeletal: No clubbing, cyanosis or edema bilaterally. Full range of motion without deformity. Skin: Skin color, texture, turgor normal.  No rashes or lesions. Neurologic:  Neurovascularly intact without any focal sensory/motor deficits. Cranial nerves: II-XII intact, grossly non-focal.  Psychiatric: Alert and oriented, thought content appropriate, normal insight  Capillary Refill: Brisk,< 3 seconds   Peripheral Pulses: +2 palpable, equal bilaterally       Labs:   Recent Labs     06/20/21  1023 06/21/21  0416   WBC 5.1 6.1   HGB 15.8 16.0   HCT 47.5 47.7    325     Recent Labs     06/20/21  1023 06/21/21  0416    136   K 3.8 4.2    104   CO2 23 21   BUN 15 16   CREATININE 1.5* 1.3   CALCIUM 8.9 9.6     Recent Labs     06/20/21  1023   AST 22   ALT 40   BILITOT 0.5   ALKPHOS 95     Recent Labs     06/20/21  1023   INR 1.01     Recent Labs     06/20/21  1023   TROPONINI <0.01       Urinalysis:      Lab Results   Component Value Date    NITRU Negative 06/20/2021    WBCUA 1 01/03/2018    RBCUA 1 01/03/2018    BLOODU Negative 06/20/2021    SPECGRAV >1.030 06/20/2021    GLUCOSEU Negative 06/20/2021       Radiology:  MRI brain without contrast   Final Result   No acute intracranial abnormality. Mild chronic microvascular disease. Sinus mucosal disease.          CTA HEAD NECK W CONTRAST   Final Result   1. Severe short-segment stenosis at the origin of the left vertebral artery. 2. Otherwise, no flow limiting stenosis of the cervical carotid/vertebral   arteries. 3. No focal stenosis of the [id number]. CT HEAD WO CONTRAST   Final Result   No acute intracranial abnormality. XR CHEST PORTABLE   Final Result   No significant findings in the chest.                 Assessment/Plan:    Active Hospital Problems    Diagnosis     Hypertensive urgency [I16.0]      Hypertensive urgency-improved  Blood pressures are still significantly elevated  Losartan increased to 100 mg daily  Will avoid metoprolol at this time in view of bradycardia  Clonidine has been added  We will give scheduled p.o. hydralazine with extra IV hydralazine as needed for SBP over 160     Vertigo  Ruled out CVA  MRI brain negative for acute events  PT OT     CKD  Creatinine creatinine actually improved today to 1.3    Severe vertebral artery stenosis on CTA head neck  We will obtain vascular surgery consultation for further recommendations    ASCVD score 14%  Moderate to high intensity statin recommended  We will continue Lipitor on discharge      DVT Prophylaxis: Lovenox  Diet: ADULT DIET; Regular;  No Added Salt (3-4 gm)  Code Status: Full Code    PT/OT Eval Status: Pending    Dispo -pending blood pressure control    Electronically signed by Rachel Stewart MD on 6/21/2021 at 12:08 PM

## 2021-06-21 NOTE — PROGRESS NOTES
VASCULAR    Consent received. CTA reviewed and orficial L vertebral stenosis noted. Will follow up with pt later today.     Casi Tracey

## 2021-06-21 NOTE — PROGRESS NOTES
4 Eyes Skin Assessment     NAME:  Lendel Hodgkin  YOB: 1975  MEDICAL RECORD NUMBER:  [id number]    The patient is being assess for  Transfer to New Unit    I agree that 2 RN's have performed a thorough Head to Toe Skin Assessment on the patient. ALL assessment sites listed below have been assessed. Areas assessed by both nurses:    Head, Face, Ears, Shoulders, Back, Chest, Arms, Elbows, Hands, Sacrum. Buttock, Coccyx, Ischium and Legs. Feet and Heels        Does the Patient have a Wound?  No noted wound(s)       Robin Prevention initiated:  No   Wound Care Orders initiated:  No    Pressure Injury (Stage 3,4, Unstageable, DTI, NWPT, and Complex wounds) if present place consult order under [de-identified] No    New and Established Ostomies if present place consult order under : No      Nurse 1 eSignature: Electronically signed by Ivana Davis RN on 6/21/21 at 4:21 PM EDT    **SHARE this note so that the co-signing nurse is able to place an eSignature**    Nurse 2 eSignature: Electronically signed by Adalgisa Berumen RN on 6/21/21 at 8:25 PM EDT

## 2021-06-21 NOTE — PROGRESS NOTES
Speech Language Pathology  Facility/Department: [address]  Initial Assessment  DYSPHAGIA BEDSIDE SWALLOW EVALUATION     Patient: Peace Valdez   : 1975   MRN: [id number]      Evaluation Date: 2021   Admitting Diagnosis: Hypertensive urgency [I16.0]  Pain: Pt denies pain                         H&P: 50 y.o. male with past medical history of hypertension presents for evaluation of vertigo for the past 2 to 3 days. Patient describes room spinning sensation and balance issues on and off. He has no other associated symptoms. Patient denies headache, blurry vision, chest pain, palpitations, shortness of breath, lower extremity swelling, nausea vomiting abdominal pain diarrhea. ADD patient found to have significantly elevated blood pressures as high as 212/122. Significant improvement after administration of IV hydralazine at the ED. CT CTA head neck without acute ischemic changes or bleed, however, notable for severe stenosis of vertebral artery. At home patient is not losartan and metoprolol, however, he does not know the dosing    Recent Chest xray: 2021  Impression   No significant findings in the chest.     Recent MRI Brain: 2021  Impression   No acute intracranial abnormality.       Mild chronic microvascular disease.       Sinus mucosal disease. History/Prior Level of Function:   Living Status: Home    Prior Dysphagia History: No prior dysphagia history. Pt reports no difficulty with current diet. Dysphagia Impressions/Diagnosis: Oropharyngeal Dysphagia   Pt alert and verbally responsive. Pt reports improvement with headache with medication and also reports no dizziness at this time. Oral motor strength and ROM appears largely [facility] with no overt facial asymmetry noted. With po trials, Pt demonstrates adequate bolus control, mastication, A-P propulsion and oral clearing with liquids and solids.  Clinical symptoms of timely swallow initiation, adequate laryngeal excursion via palpation and no overt signs of aspiration noted. Education regarding aspiration risk and precautions explained to the Pt who verbalized understanding. No further dysphagia treatment intervention is indicated at this time. Recommended Diet and Intervention 6/21/2021:  Diet Solids Recommendation:  Regular Liquid Consistency Recommendation: Thin liquids Recommended form of Meds:  Meds with water as tolerated       Discharge Recommendations: No further dysphagia treatment intervention is indicated at this time    Current Diet Level (prior to evaluation): Regular diet with thin liquids    Respiratory Status:   [x]Room Air   []O2 via nasal cannula   []Other:    Dentition:  []Adequate  []Dentures   []Missing Many Teeth  []Edentulous  []Other:    Baseline Vocal Quality:  [x]Normal  []Dysphonic   []Aphonic   []Hoarse  []Wet  []Weak  []Other:      Volitional Swallow:   []Absent   []Delayed     [x]Adequate     []Required use of drink     Oral Mechanism Exam:  [x]WFL []Mild   [] Moderate  []Severe  []To be assessed  Impaired:    Oral Phase: [x]WFL []Mild   [] Moderate  []Severe  []To be assessed     Pharyngeal Phase: [x]WFL []Mild   [] Moderate  []Severe  []To be assessed    Eating Assistance:  [x]Independent  []Setup or clean-up assistance   [] Supervision or touching assistance   [] Partial or moderate assistance   [] Substantial or maximal assistance  [] Dependent     EDUCATION:   Provided education regarding role of SLP, results of assessment, recommendations and general speech pathology plan of care. [x] Pt verbalized understanding and agreement   [] Pt requires ongoing learning   [] No evidence of comprehension     If patient discharges prior to next visit, this note will serve as discharge.      Timed Code Minutes: 0 min  Total Treatment Minutes: 25 min    Eleno Licona [id number]

## 2021-06-21 NOTE — PLAN OF CARE
Problem: Falls - Risk of:  Goal: Will remain free from falls  Description: Will remain free from falls  6/21/2021 0402 by Ronnell Em RN  Outcome: Ongoing  6/20/2021 1807 by Yumiko Castillo RN  Outcome: Ongoing  Goal: Absence of physical injury  Description: Absence of physical injury  6/21/2021 0402 by Ronnell Em RN  Outcome: Ongoing  6/20/2021 1807 by Yumiko Castillo RN  Outcome: Ongoing     Problem: Pain:  Goal: Pain level will decrease  Description: Pain level will decrease  Outcome: Ongoing   Pt remains free of falls and injury. Pt pain medicated per MAR. WCTM.

## 2021-06-21 NOTE — PROGRESS NOTES
Pt had an episode of large emesis. He has had uncontrolled BP and a HA all day. Decision was made a few minutes prior to move him to the ICU and place on a nitro gtt. Waiting for a bed assignment at this time. IV Zofran and Dilaudid were given at this time.   Richard Siddiqui RN

## 2021-06-21 NOTE — PROGRESS NOTES
Per Dr. Romulo Peacock who was messaged via perfect serve: \"Can you please order another dose of morphine for him thank you\". Orders put in.

## 2021-06-21 NOTE — CONSULTS
In patient Neurology consult        [facility] Neurology      Jimbo Christopher MD      Lindsay Dickinson  1975    Date of Service: 6/21/2021    Referring Physician: Jonathon Mckenna MD      Reason for the consult and CC: New onset dizziness and vertigo. HPI:   The patient is a 50y.o.  years old male with history of hypertension was admitted to the hospital yesterday with new onset vertigo and dizziness. Onset was 2 to 3 days ago. Description was spinning sensation with dizziness and ataxia. Degree was severe. Duration was persistent. No clear triggers. No other associated symptoms today. No headache, chest pain, dysphagia or dysarthria or falling. No other relieving or aggravating factors. He came to the ED yesterday where his blood pressure was above 200. He was admitted. Initial neuroimaging showed no acute stroke or large vessel occlusion except for left vertebral stenosis. Today he feels back to his baseline. No residual deficit. Blood pressure in the 945 systolic. MRI of the brain showed no acute stroke. Other review of system was unremarkable    Family history: Noncontributory    Surgical history: Noncontributory         Past Medical History:   Diagnosis Date    Hypertension      Social History     Tobacco Use    Smoking status: Never Smoker    Smokeless tobacco: Never Used   Vaping Use    Vaping Use: Never used   Substance Use Topics    Alcohol use: No    Drug use: No     Allergies   Allergen Reactions    Amlodipine Anaphylaxis     \"Throat closes, can;t breath, itching. \"    Codeine      Nausea      Lisinopril Swelling     And cough    Other      \"Can't take any opiates\"     Current Facility-Administered Medications   Medication Dose Route Frequency Provider Last Rate Last Admin    cloNIDine (CATAPRES) tablet 0.1 mg  0.1 mg Oral BID Oswaldo TAYLOR MD   0.1 mg at 06/21/21 0935    losartan (COZAAR) tablet 100 mg  100 mg Oral Daily Oswaldo TAYLOR MD   100 mg at 06/21/21 0935  HYDROmorphone HCl PF (DILAUDID) injection 0.5 mg  0.5 mg Intravenous Q4H PRN Oswaldo TAYLOR MD   0.5 mg at 06/21/21 1016    hydrALAZINE (APRESOLINE) injection 20 mg  20 mg Intravenous Q4H PRN Oswaldo TAYLOR MD   20 mg at 06/21/21 1140    sodium chloride flush 0.9 % injection 5-40 mL  5-40 mL Intravenous 2 times per day Oswaldo TAYLOR MD   10 mL at 06/21/21 0935    sodium chloride flush 0.9 % injection 5-40 mL  5-40 mL Intravenous PRN Oswaldo TAYLOR MD   10 mL at 06/21/21 1140    0.9 % sodium chloride infusion  25 mL Intravenous PRN Oswaldo [facility] MD BRANDON        ondansetron (ZOFRAN-ODT) disintegrating tablet 4 mg  4 mg Oral Q8H PRN Oswaldo TAYLOR MD        Or    ondansetron (ZOFRAN) injection 4 mg  4 mg Intravenous Q6H PRN Oswaldo TAYLOR MD        polyethylene glycol (GLYCOLAX) packet 17 g  17 g Oral Daily PRN Oswaldo TAYLOR MD        enoxaparin (LOVENOX) injection 40 mg  40 mg Subcutaneous Daily Oswaldo TAYLOR MD   40 mg at 06/20/21 2131    aspirin EC tablet 81 mg  81 mg Oral Daily Oswaldo TAYLOR MD   81 mg at 06/21/21 0935    Or    aspirin suppository 300 mg  300 mg Rectal Daily Oswaldo Payan MD        acetaminophen (TYLENOL) tablet 650 mg  650 mg Oral Q4H PRN Oswaldo TAYLOR MD        Or    acetaminophen (TYLENOL) suppository 650 mg  650 mg Rectal Q4H PRN Oswaldo TAYLOR MD        atorvastatin (LIPITOR) tablet 40 mg  40 mg Oral Nightly Oswaldo TAYLOR MD   40 mg at 06/20/21 2128    hydrALAZINE (APRESOLINE) tablet 50 mg  50 mg Oral 3 times per day Mike TAYLOR MD   50 mg at 06/21/21 0526    diphenhydrAMINE (BENADRYL) tablet 25 mg  25 mg Oral Q4H PRN Stephanie Christensen PA-C   25 mg at 06/20/21 8323       ROS : A 10-14 system review of constitutional, cardiovascular, respiratory, eyes, musculoskeletal, endocrine, GI, ENT, skin, hematological, genitourinary, psychiatric and neurologic systems was obtained and updated today and is unremarkable except as mentioned in my HPI. Denies any symptoms suggestive of sleep apnea      Exam:     Constitutional:   Vitals:    06/21/21 0445 06/21/21 0927 06/21/21 0940 06/21/21 1129   BP: (!) 174/100 (!) 183/126  (!) 176/111   Pulse: 61 69     Resp: 20 18 18    Temp: 98.1 °F (36.7 °C) 98.1 °F (36.7 °C)     TempSrc: Oral Oral     SpO2: 98% 98% 98%    Weight:       Height:           General appearance:  Normal development and appear in no acute distress. Eye:  Fundus of the eye: Funduscopic examination cannot be performed due to COVID19 restrictions and precautions. Neck: supple  Cardiovascular: No lower leg edema with good pulsation. Mental Status:   Oriented to person, place, problem, and time. Memory: Good immediate recall. Intact remote memory  Normal attention span and concentration. Language: intact naming, repeating and fluency   Good fund of Knowledge. Aware of current events and vocabulary   Cranial Nerves:   II: Visual fields: Full. Pupils: equal, round, reactive to light  III,IV,VI: Extra Ocular Movements are intact. No nystagmus  V: Facial sensation is intact   VII: Facial strength and movements: intact and symmetric  VIII: Hearing: Intact  IX: Palate elevation is symmetric  XI: Shoulder shrug is intact  XII: Tongue movements are normal  Musculoskeletal: 5/5 in all 4 extremities. Tone: Normal tone. Reflexes: 2+ in the upper extremity and 2+ in the lower extremity   Planters: flexor bilaterally. Coordination: no pronator drift, no dysmetria with FNF in upper extremities. Normal REM. Sensation: normal to all modalities in both arms and legs.   Gait/Posture: steady gait    Data:  LABS:   Lab Results   Component Value Date     06/21/2021    K 4.2 06/21/2021    K 3.8 06/20/2021     06/21/2021    CO2 21 06/21/2021    BUN 16 06/21/2021    CREATININE 1.3 06/21/2021    GFRAA >60 06/21/2021    LABGLOM 59 06/21/2021    GLUCOSE 120 06/21/2021    CALCIUM 9.6 06/21/2021     Lab Results   Component Value Date    WBC 6.1 06/21/2021    RBC 5.55 06/21/2021    HGB 16.0 06/21/2021    HCT 47.7 06/21/2021    MCV 86.1 06/21/2021    RDW 14.9 06/21/2021     06/21/2021     Lab Results   Component Value Date    INR 1.01 06/20/2021    PROTIME 11.7 06/20/2021       Neuroimaging were independently reviewed by me and discussed results with the patient  Reviewed notes from different physicians  Reviewed lab and blood testing    Impression:  New onset vertigo and dizziness likely secondary to hypertensive urgency. Chronic asymptomatic left vertebral stenosis. Likely related to chronic atherosclerosis from poorly controlled hypertension. No need for any vascular intervention. Maximize medical therapy  Hyperlipidemia      Recommendation:  Stroke education and secondary prevention was provided  Aspirin. He was not taking aspirin at home  Statin  Continue home blood pressure medications  Continue current blood pressure control. Goal inpatient 160/90. Goal outpatient 130/80  DVT and GI prophylax  Telemetry  PT OT  A1c  Echo  Can be discharged from neurology once medically stable  Discussed with primary team      Thank you for referring such patient. If you have any questions regarding my consult note, please don't hesitate to call me. Carmen Roa MD  [phone]    This dictation was generated by voice recognition computer software.  Although all attempts are made to edit the dictation for accuracy, there may be errors in the  transcription that are not intended

## 2021-06-21 NOTE — CONSULTS
VASCULAR SURGERY CONSULTATION    Asael Fallon is a 50 y.o. male admitted with new onset dizziness/vertigo. Now with nausea and vomiting as well as a headache. No reported lateralizing neurologic symptoms. Asked to see pt by Juliet Pierson MD for evaluation and recommendations regarding a L vertebral artery stenosis on CTA. Past Medical History:   Diagnosis Date    Hypertension      History reviewed. No pertinent surgical history. History reviewed. No pertinent family history. Social History     Socioeconomic History    Marital status: Single     Spouse name: None    Number of children: None    Years of education: None    Highest education level: None   Occupational History    None   Tobacco Use    Smoking status: Never Smoker    Smokeless tobacco: Never Used   Vaping Use    Vaping Use: Never used   Substance and Sexual Activity    Alcohol use: No    Drug use: No    Sexual activity: None   Other Topics Concern    None   Social History Narrative    None     Social Determinants of Health     Financial Resource Strain:     Difficulty of Paying Living Expenses:    Food Insecurity:     Worried About Running Out of Food in the Last Year:     Ran Out of Food in the Last Year:    Transportation Needs:     Lack of Transportation (Medical):      Lack of Transportation (Non-Medical):    Physical Activity:     Days of Exercise per Week:     Minutes of Exercise per Session:    Stress:     Feeling of Stress :    Social Connections:     Frequency of Communication with Friends and Family:     Frequency of Social Gatherings with Friends and Family:     Attends Gnosticism Services:     Active Member of Clubs or Organizations:     Attends Club or Organization Meetings:     Marital Status:    Intimate Partner Violence:     Fear of Current or Ex-Partner:     Emotionally Abused:     Physically Abused:     Sexually Abused:      Current Facility-Administered Medications   Medication Dose Route Frequency Provider Last Rate Last Admin    cloNIDine (CATAPRES) tablet 0.1 mg  0.1 mg Oral BID Oswaldo TAYLOR MD   0.1 mg at 06/21/21 0935    losartan (COZAAR) tablet 100 mg  100 mg Oral Daily Oswaldo TAYLOR MD   100 mg at 06/21/21 0935    HYDROmorphone HCl PF (DILAUDID) injection 0.5 mg  0.5 mg Intravenous Q4H PRN Oswaldo TAYLOR MD   0.5 mg at 06/21/21 1501    hydrALAZINE (APRESOLINE) injection 20 mg  20 mg Intravenous Q4H PRN Oswaldo TAYLOR MD   20 mg at 06/21/21 1140    nitroGLYCERIN 50 mg in dextrose 5% 250 mL infusion  5-200 mcg/min Intravenous Continuous Oswaldo TAYLOR MD        sodium chloride flush 0.9 % injection 5-40 mL  5-40 mL Intravenous 2 times per day Mike TAYLOR MD   10 mL at 06/21/21 0935    sodium chloride flush 0.9 % injection 5-40 mL  5-40 mL Intravenous PRN Oswaldo TAYLOR MD   10 mL at 06/21/21 1501    0.9 % sodium chloride infusion  25 mL Intravenous PRN Oswaldo TAYLOR MD        ondansetron (ZOFRAN-ODT) disintegrating tablet 4 mg  4 mg Oral Q8H PRN Oswaldo TAYLOR MD        Or    ondansetron (ZOFRAN) injection 4 mg  4 mg Intravenous Q6H PRN Oswaldo TAYLOR MD   4 mg at 06/21/21 1457    polyethylene glycol (GLYCOLAX) packet 17 g  17 g Oral Daily PRN Oswaldo TAYLOR MD        enoxaparin (LOVENOX) injection 40 mg  40 mg Subcutaneous Daily Oswaldo TAYLOR MD   40 mg at 06/20/21 2131    aspirin EC tablet 81 mg  81 mg Oral Daily Oswaldo TAYLOR MD   81 mg at 06/21/21 0935    Or    aspirin suppository 300 mg  300 mg Rectal Daily Oswaldo Payan MD        acetaminophen (TYLENOL) tablet 650 mg  650 mg Oral Q4H PRN Oswaldo TAYLOR MD        Or    acetaminophen (TYLENOL) suppository 650 mg  650 mg Rectal Q4H PRN Oswaldo TAYLOR MD        atorvastatin (LIPITOR) tablet 40 mg  40 mg Oral Nightly Oswaldo TAYLOR MD   40 mg at 06/20/21 2128    hydrALAZINE (APRESOLINE) tablet 50 mg  50 mg Oral 3 times per day Mike TAYLOR MD   50 mg at 06/21/21 1352    diphenhydrAMINE

## 2021-06-22 LAB
ANION GAP SERPL CALCULATED.3IONS-SCNC: 14 MMOL/L (ref 3–16)
BASOPHILS ABSOLUTE: 0 K/UL (ref 0–0.2)
BASOPHILS RELATIVE PERCENT: 0.2 %
BUN BLDV-MCNC: 21 MG/DL (ref 7–20)
CALCIUM SERPL-MCNC: 9.2 MG/DL (ref 8.3–10.6)
CHLORIDE BLD-SCNC: 101 MMOL/L (ref 99–110)
CO2: 21 MMOL/L (ref 21–32)
CREAT SERPL-MCNC: 1.5 MG/DL (ref 0.9–1.3)
EKG ATRIAL RATE: 53 BPM
EKG DIAGNOSIS: NORMAL
EKG P AXIS: 29 DEGREES
EKG P-R INTERVAL: 178 MS
EKG Q-T INTERVAL: 452 MS
EKG QRS DURATION: 96 MS
EKG QTC CALCULATION (BAZETT): 424 MS
EKG R AXIS: 29 DEGREES
EKG T AXIS: -13 DEGREES
EKG VENTRICULAR RATE: 53 BPM
EOSINOPHILS ABSOLUTE: 0 K/UL (ref 0–0.6)
EOSINOPHILS RELATIVE PERCENT: 0.1 %
ESTIMATED AVERAGE GLUCOSE: 111.2 MG/DL
GFR AFRICAN AMERICAN: >60
GFR NON-AFRICAN AMERICAN: 50
GLUCOSE BLD-MCNC: 142 MG/DL (ref 70–99)
HBA1C MFR BLD: 5.5 %
HCT VFR BLD CALC: 45.2 % (ref 40.5–52.5)
HEMOGLOBIN: 15.4 G/DL (ref 13.5–17.5)
LYMPHOCYTES ABSOLUTE: 1.2 K/UL (ref 1–5.1)
LYMPHOCYTES RELATIVE PERCENT: 15.7 %
MAGNESIUM: 2 MG/DL (ref 1.8–2.4)
MCH RBC QN AUTO: 29.1 PG (ref 26–34)
MCHC RBC AUTO-ENTMCNC: 34.1 G/DL (ref 31–36)
MCV RBC AUTO: 85.3 FL (ref 80–100)
MONOCYTES ABSOLUTE: 0.7 K/UL (ref 0–1.3)
MONOCYTES RELATIVE PERCENT: 9 %
NEUTROPHILS ABSOLUTE: 5.6 K/UL (ref 1.7–7.7)
NEUTROPHILS RELATIVE PERCENT: 75 %
PDW BLD-RTO: 15.4 % (ref 12.4–15.4)
PHOSPHORUS: 3.6 MG/DL (ref 2.5–4.9)
PLATELET # BLD: 328 K/UL (ref 135–450)
PMV BLD AUTO: 8.2 FL (ref 5–10.5)
POTASSIUM SERPL-SCNC: 3.6 MMOL/L (ref 3.5–5.1)
RBC # BLD: 5.3 M/UL (ref 4.2–5.9)
SODIUM BLD-SCNC: 136 MMOL/L (ref 136–145)
WBC # BLD: 7.5 K/UL (ref 4–11)

## 2021-06-22 PROCEDURE — 93010 ELECTROCARDIOGRAM REPORT: CPT | Performed by: INTERNAL MEDICINE

## 2021-06-22 PROCEDURE — 85025 COMPLETE CBC W/AUTO DIFF WBC: CPT

## 2021-06-22 PROCEDURE — 97165 OT EVAL LOW COMPLEX 30 MIN: CPT

## 2021-06-22 PROCEDURE — 2000000000 HC ICU R&B

## 2021-06-22 PROCEDURE — 99223 1ST HOSP IP/OBS HIGH 75: CPT | Performed by: INTERNAL MEDICINE

## 2021-06-22 PROCEDURE — 2580000003 HC RX 258: Performed by: INTERNAL MEDICINE

## 2021-06-22 PROCEDURE — 97535 SELF CARE MNGMENT TRAINING: CPT

## 2021-06-22 PROCEDURE — 97162 PT EVAL MOD COMPLEX 30 MIN: CPT

## 2021-06-22 PROCEDURE — C9113 INJ PANTOPRAZOLE SODIUM, VIA: HCPCS | Performed by: INTERNAL MEDICINE

## 2021-06-22 PROCEDURE — 84100 ASSAY OF PHOSPHORUS: CPT

## 2021-06-22 PROCEDURE — 97530 THERAPEUTIC ACTIVITIES: CPT

## 2021-06-22 PROCEDURE — 83735 ASSAY OF MAGNESIUM: CPT

## 2021-06-22 PROCEDURE — 97116 GAIT TRAINING THERAPY: CPT

## 2021-06-22 PROCEDURE — 2500000003 HC RX 250 WO HCPCS: Performed by: INTERNAL MEDICINE

## 2021-06-22 PROCEDURE — 6370000000 HC RX 637 (ALT 250 FOR IP): Performed by: INTERNAL MEDICINE

## 2021-06-22 PROCEDURE — 80048 BASIC METABOLIC PNL TOTAL CA: CPT

## 2021-06-22 PROCEDURE — 6360000002 HC RX W HCPCS: Performed by: INTERNAL MEDICINE

## 2021-06-22 RX ORDER — PROMETHAZINE HYDROCHLORIDE 25 MG/ML
6.25 INJECTION, SOLUTION INTRAMUSCULAR; INTRAVENOUS EVERY 6 HOURS PRN
Status: DISCONTINUED | OUTPATIENT
Start: 2021-06-22 | End: 2021-06-25 | Stop reason: HOSPADM

## 2021-06-22 RX ORDER — CLONIDINE HYDROCHLORIDE 0.1 MG/1
0.1 TABLET ORAL 3 TIMES DAILY
Status: DISCONTINUED | OUTPATIENT
Start: 2021-06-22 | End: 2021-06-24

## 2021-06-22 RX ORDER — HYDRALAZINE HYDROCHLORIDE 20 MG/ML
10 INJECTION INTRAMUSCULAR; INTRAVENOUS EVERY 4 HOURS PRN
Status: DISCONTINUED | OUTPATIENT
Start: 2021-06-22 | End: 2021-06-25 | Stop reason: HOSPADM

## 2021-06-22 RX ORDER — LABETALOL HYDROCHLORIDE 5 MG/ML
10 INJECTION, SOLUTION INTRAVENOUS ONCE
Status: COMPLETED | OUTPATIENT
Start: 2021-06-22 | End: 2021-06-22

## 2021-06-22 RX ORDER — LABETALOL HYDROCHLORIDE 5 MG/ML
10 INJECTION, SOLUTION INTRAVENOUS EVERY 6 HOURS PRN
Status: DISCONTINUED | OUTPATIENT
Start: 2021-06-22 | End: 2021-06-25 | Stop reason: HOSPADM

## 2021-06-22 RX ORDER — PANTOPRAZOLE SODIUM 40 MG/10ML
40 INJECTION, POWDER, LYOPHILIZED, FOR SOLUTION INTRAVENOUS DAILY
Status: DISCONTINUED | OUTPATIENT
Start: 2021-06-22 | End: 2021-06-25 | Stop reason: HOSPADM

## 2021-06-22 RX ADMIN — LABETALOL HYDROCHLORIDE 10 MG: 5 INJECTION INTRAVENOUS at 14:58

## 2021-06-22 RX ADMIN — Medication 10 ML: at 20:13

## 2021-06-22 RX ADMIN — CLONIDINE HYDROCHLORIDE 0.1 MG: 0.1 TABLET ORAL at 20:04

## 2021-06-22 RX ADMIN — HYDROMORPHONE HYDROCHLORIDE 0.5 MG: 1 INJECTION, SOLUTION INTRAMUSCULAR; INTRAVENOUS; SUBCUTANEOUS at 08:44

## 2021-06-22 RX ADMIN — CLONIDINE HYDROCHLORIDE 0.1 MG: 0.1 TABLET ORAL at 08:44

## 2021-06-22 RX ADMIN — ONDANSETRON 4 MG: 4 TABLET, ORALLY DISINTEGRATING ORAL at 09:34

## 2021-06-22 RX ADMIN — HYDRALAZINE HYDROCHLORIDE 50 MG: 25 TABLET, FILM COATED ORAL at 11:50

## 2021-06-22 RX ADMIN — ACETAMINOPHEN 650 MG: 325 TABLET ORAL at 16:14

## 2021-06-22 RX ADMIN — HYDRALAZINE HYDROCHLORIDE 50 MG: 25 TABLET, FILM COATED ORAL at 22:04

## 2021-06-22 RX ADMIN — LABETALOL HYDROCHLORIDE 10 MG: 5 INJECTION INTRAVENOUS at 21:08

## 2021-06-22 RX ADMIN — ACETAMINOPHEN 650 MG: 325 TABLET ORAL at 11:48

## 2021-06-22 RX ADMIN — HYDROMORPHONE HYDROCHLORIDE 0.5 MG: 1 INJECTION, SOLUTION INTRAMUSCULAR; INTRAVENOUS; SUBCUTANEOUS at 14:43

## 2021-06-22 RX ADMIN — HYDROMORPHONE HYDROCHLORIDE 0.5 MG: 1 INJECTION, SOLUTION INTRAMUSCULAR; INTRAVENOUS; SUBCUTANEOUS at 00:08

## 2021-06-22 RX ADMIN — PANTOPRAZOLE SODIUM 40 MG: 40 INJECTION, POWDER, FOR SOLUTION INTRAVENOUS at 17:32

## 2021-06-22 RX ADMIN — CLONIDINE HYDROCHLORIDE 0.1 MG: 0.1 TABLET ORAL at 13:02

## 2021-06-22 RX ADMIN — HYDROMORPHONE HYDROCHLORIDE 0.5 MG: 1 INJECTION, SOLUTION INTRAMUSCULAR; INTRAVENOUS; SUBCUTANEOUS at 18:43

## 2021-06-22 RX ADMIN — HYDROMORPHONE HYDROCHLORIDE 0.5 MG: 1 INJECTION, SOLUTION INTRAMUSCULAR; INTRAVENOUS; SUBCUTANEOUS at 04:13

## 2021-06-22 RX ADMIN — ACETAMINOPHEN 650 MG: 325 TABLET ORAL at 20:21

## 2021-06-22 RX ADMIN — Medication 10 ML: at 08:45

## 2021-06-22 RX ADMIN — ENOXAPARIN SODIUM 40 MG: 40 INJECTION SUBCUTANEOUS at 20:03

## 2021-06-22 RX ADMIN — PROMETHAZINE HYDROCHLORIDE 6.25 MG: 25 INJECTION INTRAMUSCULAR; INTRAVENOUS at 10:30

## 2021-06-22 RX ADMIN — ATORVASTATIN CALCIUM 40 MG: 40 TABLET, FILM COATED ORAL at 20:03

## 2021-06-22 RX ADMIN — ASPIRIN 81 MG: 81 TABLET, COATED ORAL at 08:44

## 2021-06-22 RX ADMIN — LOSARTAN POTASSIUM 100 MG: 100 TABLET, FILM COATED ORAL at 08:44

## 2021-06-22 RX ADMIN — HYDRALAZINE HYDROCHLORIDE 50 MG: 25 TABLET, FILM COATED ORAL at 05:54

## 2021-06-22 ASSESSMENT — PAIN DESCRIPTION - ONSET
ONSET: ON-GOING

## 2021-06-22 ASSESSMENT — PAIN SCALES - GENERAL
PAINLEVEL_OUTOF10: 5
PAINLEVEL_OUTOF10: 8
PAINLEVEL_OUTOF10: 7
PAINLEVEL_OUTOF10: 0
PAINLEVEL_OUTOF10: 0
PAINLEVEL_OUTOF10: 8
PAINLEVEL_OUTOF10: 7
PAINLEVEL_OUTOF10: 8
PAINLEVEL_OUTOF10: 3
PAINLEVEL_OUTOF10: 2
PAINLEVEL_OUTOF10: 3
PAINLEVEL_OUTOF10: 8
PAINLEVEL_OUTOF10: 8
PAINLEVEL_OUTOF10: 9
PAINLEVEL_OUTOF10: 3
PAINLEVEL_OUTOF10: 2
PAINLEVEL_OUTOF10: 7
PAINLEVEL_OUTOF10: 5
PAINLEVEL_OUTOF10: 7
PAINLEVEL_OUTOF10: 9

## 2021-06-22 ASSESSMENT — PAIN DESCRIPTION - FREQUENCY
FREQUENCY: CONTINUOUS

## 2021-06-22 ASSESSMENT — PAIN - FUNCTIONAL ASSESSMENT
PAIN_FUNCTIONAL_ASSESSMENT: PREVENTS OR INTERFERES SOME ACTIVE ACTIVITIES AND ADLS
PAIN_FUNCTIONAL_ASSESSMENT: PREVENTS OR INTERFERES WITH MANY ACTIVE NOT PASSIVE ACTIVITIES
PAIN_FUNCTIONAL_ASSESSMENT: PREVENTS OR INTERFERES SOME ACTIVE ACTIVITIES AND ADLS

## 2021-06-22 ASSESSMENT — PAIN DESCRIPTION - LOCATION
LOCATION: HEAD

## 2021-06-22 ASSESSMENT — PAIN DESCRIPTION - PROGRESSION
CLINICAL_PROGRESSION: GRADUALLY IMPROVING
CLINICAL_PROGRESSION: GRADUALLY WORSENING
CLINICAL_PROGRESSION: GRADUALLY WORSENING
CLINICAL_PROGRESSION: GRADUALLY IMPROVING

## 2021-06-22 ASSESSMENT — PAIN DESCRIPTION - DESCRIPTORS
DESCRIPTORS: ACHING
DESCRIPTORS: ACHING
DESCRIPTORS: ACHING;HEADACHE
DESCRIPTORS: HEADACHE
DESCRIPTORS: ACHING;HEADACHE

## 2021-06-22 ASSESSMENT — PAIN DESCRIPTION - ORIENTATION
ORIENTATION: MID

## 2021-06-22 ASSESSMENT — PAIN DESCRIPTION - PAIN TYPE
TYPE: ACUTE PAIN

## 2021-06-22 NOTE — PROGRESS NOTES
Physical Therapy    Facility/Department: [facility]  Initial Assessment/Discharge Summary    NAME: Luz Marina Hayden  : 1975  MRN: [id number]    Date of Service: 2021    Discharge Recommendations:    Luz Marina Hayden scored a 22/24 on the AM-PAC short mobility form. At this time, no further PT is recommended upon discharge due to pt being independent with functional mobility on this date. Recommend patient returns to prior setting with prior services. PT Equipment Recommendations  Equipment Needed: No    Assessment   Assessment: Pt is presenting at baseline functioning of independence. As such, pt is to be discharged from PT caseload. Treatment Diagnosis: Baseline Functioning  Prognosis: Excellent  Decision Making: Medium Complexity  History: HTN  Exam: Balance; Ambulation  Clinical Presentation: Evolving  PT Education: Goals;PT Role;Plan of Care;General Safety  Patient Education: Pt educated on discharge recommendations. Pt verbalized understanding. Barriers to Learning: None  REQUIRES PT FOLLOW UP: No  Activity Tolerance  Activity Tolerance: Patient Tolerated treatment well;Treatment limited secondary to medical complications (free text)  Activity Tolerance: Emesis occured during session. Patient Diagnosis(es): The primary encounter diagnosis was Hypertensive urgency. Diagnoses of Acute nonintractable headache, unspecified headache type and Dizziness were also pertinent to this visit. has a past medical history of Hypertension. has no past surgical history on file. Restrictions  Restrictions/Precautions  Restrictions/Precautions: Modified Diet, Fall Risk (Medium fall risk; low sodium diet)  Position Activity Restriction  Other position/activity restrictions: 50 y.o. male with past medical history of hypertension presents for evaluation of vertigo for the past 2 to 3 days. Patient describes room spinning sensation and balance issues on and off. He has no other associated symptoms. strength at least 3/5  Tone RLE  RLE Tone: Normotonic  Tone LLE  LLE Tone: Normotonic  Motor Control  Gross Motor?: WFL  Sensation  Overall Sensation Status: WFL  Bed mobility  Supine to Sit: Independent  Sit to Supine: Independent  Scooting: Independent  Transfers  Sit to Stand: Independent  Stand to sit: Independent  Bed to Chair: Independent (Bed>shower bench)  Ambulation  Ambulation?: Yes  Ambulation 1  Surface: level tile  Device: No Device  Assistance: Supervision  Quality of Gait: pts gait appears to be normal for him. Gait Deviations: Slow Alanna; Increased HAYDEN  Distance: 15' x2  Comments: Bed>shower;  Shower>bed  Stairs/Curb  Stairs?: No     Balance  Posture: Good  Sitting - Static: Good (Pt sat EOB independently.)  Sitting - Dynamic: Good (Pt performed ADLs EOB independently)  Standing - Static: Good (pt stood at sink independently.)  Standing - Dynamic: Good (pt performed ADLs in bathroom Independently for the majority of the time during shower ~8 minutes total. CGA>SBA for emesis during session.)        Plan   Plan  Plan Comment: Pt to be discharged from PT caseload  Safety Devices  Type of devices: Nurse notified, Left in bed, Call light within reach, Patient at risk for falls  Restraints  Initially in place: No    G-Code       OutComes Score    AM-PAC Score  AM-PAC Inpatient Mobility Raw Score : 22 (06/22/21 1018)  AM-PAC Inpatient T-Scale Score : 53.28 (06/22/21 1018)  Mobility Inpatient CMS 0-100% Score: 20.91 (06/22/21 1018)  Mobility Inpatient CMS G-Code Modifier : Morgannadeensierra Marionamirah (06/22/21 1018)          Goals  Short term goals  Time Frame for Short term goals: Pt to be discharged from PT caseload       Therapy Time   Individual Concurrent Group Co-treatment   Time In 0921         Time Out 0959         Minutes 38         Timed Code Treatment Minutes:  23 Minutes    Total Treatment Minutes:  38 Minutes    FRANKLYN Lang    PT providing direct supervision during session and assisting in making skilled

## 2021-06-22 NOTE — PROGRESS NOTES
Physician Progress Note      PATIENTDaridakota Varghese  CSN #:                  [id number]  :                       1975  ADMIT DATE:       2021 10:05 AM  [address] [place] DATE:  RESPONDING  PROVIDER #:        Joycelyn Tran MD          QUERY TEXT:    Patient admitted with HTN Urgency. Noted documentation of HTN Urgency in H&P   21 and HTN Emergency in Nephrology note 21. If possible, please document in progress notes and discharge summary if you   are evaluating and /or treating any of the following: The medical record reflects the following:  Risk Factors: HTN  Clinical Indicators: Per ED note 21 /122. Per Nephrology note   21 \"FATUMA likely 2/2 HTN emergency. \"  Treatment: Nephrology consult, iv Apresoline, iv NGT gtt, po Losartan, po   Catapres,  monitor labs  Thank you, Claude Esquivel RN [facility] CDS  Options provided:  -- HTN Urgency confirmed and HTN Emergency ruled out  -- HTN Emergency confirmed and HTN Urgency ruled out  -- Other - I will add my own diagnosis  -- Disagree - Not applicable / Not valid  -- Disagree - Clinically unable to determine / Unknown  -- Refer to Clinical Documentation Reviewer    PROVIDER RESPONSE TEXT:    After study, HTN Emergency confirmed and HTN Urgency ruled out.     Query created by: Marty Ceballos on 2021 1:42 PM      Electronically signed by:  Joycelyn Tran MD 2021 1:44 PM

## 2021-06-22 NOTE — PROGRESS NOTES
Occupational Therapy   Occupational Therapy Initial Assessment  Date: 2021   Patient Name: Luz Marina Hayden  MRN: [id number]     : 1975    Date of Service: 2021    Discharge Recommendations:  Luz Marina Hayden scored a 24/24 on the [facility] ADL Inpatient form. At this time, no further OT is recommended upon discharge due to pt being at baseline. Recommend patient returns to prior setting with prior services. OT Equipment Recommendations  Equipment Needed: No    Assessment   Assessment: eval and d/c  Treatment Diagnosis: HTN  Prognosis: Good  Decision Making: Low Complexity  History: pt independent  Patient Education: eval, discharge  REQUIRES OT FOLLOW UP: No  Activity Tolerance  Activity Tolerance: Patient Tolerated treatment well  Safety Devices  Safety Devices in place: Yes  Type of devices: All fall risk precautions in place;Nurse notified;Call light within reach; Left in bed (med fall risk)           Patient Diagnosis(es): The primary encounter diagnosis was Hypertensive urgency. Diagnoses of Acute nonintractable headache, unspecified headache type and Dizziness were also pertinent to this visit. has a past medical history of Hypertension. has no past surgical history on file. Treatment Diagnosis: HTN      Restrictions  Restrictions/Precautions  Restrictions/Precautions: Modified Diet, Fall Risk (Medium fall risk; low sodium diet)  Position Activity Restriction  Other position/activity restrictions: 50 y.o. male with past medical history of hypertension presents for evaluation of vertigo for the past 2 to 3 days. Patient describes room spinning sensation and balance issues on and off. He has no other associated symptoms. Patient denies headache, blurry vision, chest pain, palpitations, shortness of breath, lower extremity swelling, nausea vomiting abdominal pain diarrhea. ADD patient found to have significantly elevated blood pressures as high as 212/122.   Significant improvement after administration of IV hydralazine at the ED. CT CTA head neck without acute ischemic changes or bleed, however, notable for severe stenosis of vertebral artery. At home patient is not losartan and metoprolol, however, he does not know the dosing. Subjective   General  Chart Reviewed: Yes  Additional Pertinent Hx: HTN  Family / Caregiver Present: No  Diagnosis: Hypertensive urgency  Subjective  Subjective: pt supine upon arrival,  RN approval to see pt. pt denies pain at rest. reporting some nausea  Patient Currently in Pain: Denies  Pain Assessment  Pain Assessment: 0-10  Pain Level: 8  Vital Signs  Temp: 98.7 °F (37.1 °C)  Temp Source: Temporal  Pulse: 76  Heart Rate Source: Monitor  Resp: 20  BP: (!) 183/91  BP Location: Left upper arm  MAP (mmHg): 118  Patient Position: Semi fowlers  Level of Consciousness: Alert (0)  MEWS Score: 1  Patient Currently in Pain: Denies  Oxygen Therapy  SpO2: 96 %  Pulse Oximeter Device Mode: Intermittent  Pulse Oximeter Device Location: Finger  O2 Device: None (Room air)  Social/Functional History  Social/Functional History  Lives With: Spouse  Type of Home: House  Home Layout: Two level  Additional Comments: Pt lost his job due to [employer]; pt begins a job on [employer]. 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 pt reports no falls in the past 6 months. Objective        Orientation  Overall Orientation Status: Within Normal Limits  Observation/Palpation  Posture: Good  Balance  Sitting Balance: Independent  Standing Balance: Independent  Standing Balance  Time: ~15-20 minutes  Activity: mobility to/from bathroom, stance in shower  Comment: independent  Functional Mobility  Functional - Mobility Device: No device  Activity: To/from bathroom  Assist Level: Independent  Shower Transfers  Shower - Transfer From:  (no device)  Shower - Transfer Type: To and From  Shower - Transfer To:  Shower seat with back  Shower - Technique: Ambulating  Shower Transfers: Modified

## 2021-06-22 NOTE — PROGRESS NOTES
Unable to switch from nitro gtt d/t pt allergies. Increased nitro gtt to 20mcg/min. Dilaudid and tylenol given PRN per orders for headache, see MAR. Labetalol given per MAR. Will continue to monitor and titrate nitro gtt for a SBP > 150. Do not drop SBP below 150 per hospitalist and nephrology.

## 2021-06-22 NOTE — CONSULTS
Office : [phone]     Fax :[phone]       Nephrology Consult Note      Patient's Name: Steven Hidalgo  9:19 AM  6/22/2021    Reason for Consult:  HTN urgency       Requesting Physician:  Kacey Pablo MD      Chief Complaint:    Chief Complaint   Patient presents with    Headache     Patient presents with headache and dizziness that started 2 days ago. Ran out of his BP medications 2 weeks ago. Insurance changed and unable to find PCP yet. History of Present iIlness:    Steven Hidalgo is a 50 y.o. male with  H/o HTN who was admitted with c/o headaches and had elevated BP of 213/122 . He was taking losartan and metoprolol but was not able to take since ran out of meds. He mentioned he lost his job because of [employer] and has no PCP now. BP has slowly improved overnight   His creatinine is elevated at 1.5   At admission was 1.3   Has CTA and exposure to contrast       I/O last 3 completed shifts: In: 927.3 [P.O.:600; I.V.:327.3]  Out: 450 [Urine:450]    Past Medical History:   Diagnosis Date    Hypertension        History reviewed. No pertinent surgical history. History reviewed. No pertinent family history. reports that he has never smoked. He has never used smokeless tobacco. He reports that he does not drink alcohol and does not use drugs.         Allergies:  Amlodipine, Codeine, Lisinopril, and Other    Current Medications:    cloNIDine (CATAPRES) tablet 0.1 mg, BID  losartan (COZAAR) tablet 100 mg, Daily  HYDROmorphone HCl PF (DILAUDID) injection 0.5 mg, Q4H PRN  hydrALAZINE (APRESOLINE) injection 20 mg, Q4H PRN  nitroGLYCERIN 50 mg in dextrose 5% 250 mL infusion, Continuous  sodium chloride flush 0.9 % injection 5-40 mL, 2 times per day  sodium chloride flush 0.9 % injection 5-40 mL, PRN  0.9 % sodium chloride infusion, PRN  ondansetron (ZOFRAN-ODT) disintegrating tablet 4 mg, Q8H PRN   Or  ondansetron (ZOFRAN) injection 4 mg, Q6H PRN  polyethylene glycol (GLYCOLAX) packet 17 g, Daily PRN  enoxaparin (LOVENOX) injection 40 mg, Daily  aspirin EC tablet 81 mg, Daily   Or  aspirin suppository 300 mg, Daily  acetaminophen (TYLENOL) tablet 650 mg, Q4H PRN   Or  acetaminophen (TYLENOL) suppository 650 mg, Q4H PRN  atorvastatin (LIPITOR) tablet 40 mg, Nightly  hydrALAZINE (APRESOLINE) tablet 50 mg, 3 times per day  diphenhydrAMINE (BENADRYL) tablet 25 mg, Q4H PRN        Review of Systems:   14 point ROS obtained but were negative except mentioned in HPI      Physical exam:     Vitals:  BP (!) 166/105   Pulse 84   Temp 98.6 °F (37 °C) (Temporal)   Resp 20   Ht 6' (1.829 m)   Wt 240 lb 11.9 oz (109.2 kg)   SpO2 95%   BMI 32.65 kg/m²   Constitutional:  OAA X3 NAD  Skin: no rash, turgor wnl  Heent:  eomi, mmm  Neck: no bruits or jvd noted  Cardiovascular:  S1, S2 without m/r/g  Respiratory: CTA B without w/r/r  Abdomen:  +bs, soft, nt, nd  Ext: no  lower extremity edema  Psychiatric: mood and affect appropriate  Musculoskeletal:  Rom, muscular strength intact    Labs:  CBC:   Recent Labs     06/20/21  1023 06/21/21  0416 06/22/21  0419   WBC 5.1 6.1 7.5   HGB 15.8 16.0 15.4    325 328     BMP:    Recent Labs     06/20/21  1023 06/21/21  0416 06/22/21  0419    136 136   K 3.8 4.2 3.6    104 101   CO2 23 21 21   BUN 15 16 21*   CREATININE 1.5* 1.3 1.5*   GLUCOSE 102* 120* 142*     Ca/Mg/Phos:   Recent Labs     06/20/21  1023 06/21/21  0416 06/22/21  0419   CALCIUM 8.9 9.6 9.2   MG  --   --  2.00   PHOS  --   --  3.6     Hepatic:   Recent Labs     06/20/21  1023   AST 22   ALT 40   BILITOT 0.5   ALKPHOS 95     Troponin:   Recent Labs     06/20/21  1023   TROPONINI <0.01     BNP: No results for input(s): BNP in the last 72 hours.   Lipids:   Recent Labs 06/21/21  0416   CHOL 199   TRIG 67   HDL 47   LDLCALC 139*   LABVLDL 13     ABGs: No results for input(s): PHART, PO2ART, CKS6XSV in the last 72 hours. INR:   Recent Labs     06/20/21  1023   INR 1.01     UA:  Recent Labs     06/20/21  1715   COLORU YELLOW   CLARITYU Clear   GLUCOSEU Negative   BILIRUBINUR Negative   KETUA Negative   SPECGRAV >1.030   BLOODU Negative   PHUR 7.5   PROTEINU Negative   UROBILINOGEN 0.2   NITRU Negative   LEUKOCYTESUR Negative   LABMICR Not Indicated   Glorianne Stagers NotGiven      Urine Microscopic: No results for input(s): LABCAST, BACTERIA, COMU, HYALCAST, WBCUA, RBCUA, EPIU in the last 72 hours. Urine Culture: No results for input(s): LABURIN in the last 72 hours. Urine Chemistry: No results for input(s): Zaid Showers, PROTEINUR, NAUR in the last 72 hours. IMAGING:  MRI brain without contrast   Final Result   No acute intracranial abnormality. Mild chronic microvascular disease. Sinus mucosal disease. CTA HEAD NECK W CONTRAST   Final Result   1. Severe short-segment stenosis at the origin of the left vertebral artery. 2. Otherwise, no flow limiting stenosis of the cervical carotid/vertebral   arteries. 3. No focal stenosis of the vksceg-ev-Sepiqo. CT HEAD WO CONTRAST   Final Result   No acute intracranial abnormality. XR CHEST PORTABLE   Final Result   No significant findings in the chest.                 Assessment/Plan :      1. FATUMA likely 2/2 HTN emergency   Also received contrast   Monitor renal function   Recommend to dose adjust all medications  based on renal functions  Maintain SBP> 90 mmHg   Daily weights   AVOID NSAIDs  Avoid Nephrotoxins  Monitor Intake/Output  Call if significant decrease in urine output     2. HTN. On hydralazine, losartan,   Clonidine   Will recommend to slowly bring the BP down   Avoid dropping SBP < 140 mmHG today     Hypertension education given     ? Lose weight (if you are overweight)  ? Choose a diet low in fat and rich in fruits, vegetables, and low-fat dairy products  ? Reduce the amount of salt you eat, avoid sodas. ?Do something active for at least 30 minutes a day on most days of the week  ? Cut down on alcohol (if you drink more than 2 alcoholic drinks per day), if you smoke then try to quit.                 D/w primary team      Thank you for allowing us to participate in care of [place] Duverney         Electronically signed by: Malika Wilhelm MD, 6/22/2021, 9:19 AM      [facility] [address] [place] [address] S  Office : [phone]  Fax :[phone]

## 2021-06-22 NOTE — PROGRESS NOTES
D/t patient headache, hospitalist and nephrology would like to have nitro gtt turned to 10 mcg/min. 1400 hydralazine to be given now, and additional dose of clonidine to be given at 1300. Will continue to monitor.

## 2021-06-22 NOTE — PROGRESS NOTES
Pt working with PT/OT, complaints of nausea. Medicated per MAR with zofran. Pt had large episode of emesis. Message sent to hospitalist for nausea medication.

## 2021-06-22 NOTE — PROGRESS NOTES
Hospitalist Progress Note      PCP: Jair Burns MD    Date of Admission: 6/20/2021    Chief Complaint: Vertigo    Hospital Course: 50 y.o. male with PMHx  of hypertension presented for evaluation of vertigo for the past 2 to 3 days. Patient describes room spinning sensation and balance issues on and off. He has no other associated symptoms. Patient denies headache, blurry vision, chest pain, palpitations, shortness of breath, lower extremity swelling, nausea vomiting abdominal pain diarrhea. On admission, patient found to have significantly elevated blood pressures as high as 212/122. Significant improvement after administration of IV hydralazine at the ED.  CT& CTA head neck without acute ischemic changes or bleed, however, notable for severe stenosis of vertebral artery. At home patient is on losartan and metoprolol, however, he does not know the dosing.      Interval history  Patient seen and examined today  Overnight events and interval ancillary notes reviewed  Complaint of headache but denied any dizziness, chest pain, shortness of breath or palpitations  BP remain elevated this morning; currently on clonidine, hydralazine and losartan   taper nitroglycerin drip; goal -160   Added labetalol as needed for systolic BP> 978      Medications:  Reviewed    Infusion Medications    nitroGLYCERIN 40 mcg/min (06/22/21 0821)    sodium chloride       Scheduled Medications    cloNIDine  0.1 mg Oral BID    losartan  100 mg Oral Daily    sodium chloride flush  5-40 mL Intravenous 2 times per day    enoxaparin  40 mg Subcutaneous Daily    aspirin  81 mg Oral Daily    Or    aspirin  300 mg Rectal Daily    atorvastatin  40 mg Oral Nightly    hydrALAZINE  50 mg Oral 3 times per day     PRN Meds: HYDROmorphone, hydrALAZINE, sodium chloride flush, sodium chloride, ondansetron **OR** ondansetron, polyethylene glycol, acetaminophen **OR** acetaminophen, diphenhydrAMINE      Intake/Output Summary (Last 24 hours) at 6/22/2021 0824  Last data filed at 6/22/2021 8103  Gross per 24 hour   Intake 960.83 ml   Output 450 ml   Net 510.83 ml       Physical Exam Performed:    BP (!) 167/93   Pulse 71   Temp 98.8 °F (37.1 °C) (Temporal)   Resp 22   Ht 6' (1.829 m)   Wt 240 lb 11.9 oz (109.2 kg)   SpO2 96%   BMI 32.65 kg/m²     General appearance: No apparent distress, appears stated age and cooperative. Respiratory:  Normal respiratory effort. Clear to auscultation, bilaterally without Rales/Wheezes/Rhonchi. Cardiovascular: Regular rate and rhythm with normal S1/S2 without murmurs, rubs or gallops. Abdomen: Soft, non-tender, non-distended with normal bowel sounds. Musculoskeletal: No clubbing, cyanosis or edema bilaterally. Full range of motion without deformity. Neurologic:  Neurovascularly intact without any focal sensory/motor deficits. Cranial nerves: II-XII intact, grossly non-focal.      Labs:   Recent Labs     06/20/21  1023 06/21/21  0416 06/22/21  0419   WBC 5.1 6.1 7.5   HGB 15.8 16.0 15.4   HCT 47.5 47.7 45.2    325 328     Recent Labs     06/20/21  1023 06/21/21  0416 06/22/21  0419    136 136   K 3.8 4.2 3.6    104 101   CO2 23 21 21   BUN 15 16 21*   CREATININE 1.5* 1.3 1.5*   CALCIUM 8.9 9.6 9.2   PHOS  --   --  3.6     Recent Labs     06/20/21  1023   AST 22   ALT 40   BILITOT 0.5   ALKPHOS 95     Recent Labs     06/20/21  1023   INR 1.01     Recent Labs     06/20/21  1023   TROPONINI <0.01       Urinalysis:      Lab Results   Component Value Date    NITRU Negative 06/20/2021    WBCUA 1 01/03/2018    RBCUA 1 01/03/2018    BLOODU Negative 06/20/2021    SPECGRAV >1.030 06/20/2021    GLUCOSEU Negative 06/20/2021       Radiology:  MRI brain without contrast   Final Result   No acute intracranial abnormality. Mild chronic microvascular disease. Sinus mucosal disease. CTA HEAD NECK W CONTRAST   Final Result   1.  Severe short-segment stenosis at the origin of the left vertebral artery. 2. Otherwise, no flow limiting stenosis of the cervical carotid/vertebral   arteries. 3. No focal stenosis of the [id number]-sw-Ylxjax. CT HEAD WO CONTRAST   Final Result   No acute intracranial abnormality. XR CHEST PORTABLE   Final Result   No significant findings in the chest.                 Assessment/Plan:    Active Hospital Problems    Diagnosis     Dizziness [R42]     Vertebral artery stenosis, left [I65.02]     Hypertensive urgency [I16.0]     HTN (hypertension), benign [I10]      Hypertensive emergency : BP improving  currently on clonidine, hydralazine and losartan   taper nitroglycerin drip; goal -160   Added labetalol as needed for systolic BP> 603  Metoprolol on hold for now; concerns for bradycardia     Vertigo: Resolved  Ruled out CVA  MRI brain negative for acute events     FATUMA: Secondary to hypertensive emergency  Creatinine elevated to 1.5  Nephrology consulted; appreciate their recommendations  Avoid nephrotoxins  Continue IV fluids   Strict intake and output  Daily weights     Severe vertebral artery stenosis on CTA head neck  Vascular surgery consulted; no surgical intervention needed per their recs    ASCVD score 14%  Moderate to high intensity statin recommended  We will continue Lipitor on discharge      DVT Prophylaxis: Lovenox  Diet: ADULT DIET; Regular;  No Added Salt (3-4 gm)  Code Status: Full Code      Dispo : Home when medically stable    Electronically signed by Maciej Ro MD on 6/22/2021 at 8:24 AM

## 2021-06-23 LAB
ANION GAP SERPL CALCULATED.3IONS-SCNC: 12 MMOL/L (ref 3–16)
BASOPHILS ABSOLUTE: 0 K/UL (ref 0–0.2)
BASOPHILS RELATIVE PERCENT: 0.5 %
BUN BLDV-MCNC: 16 MG/DL (ref 7–20)
CALCIUM SERPL-MCNC: 8.9 MG/DL (ref 8.3–10.6)
CHLORIDE BLD-SCNC: 102 MMOL/L (ref 99–110)
CO2: 21 MMOL/L (ref 21–32)
CREAT SERPL-MCNC: 1.2 MG/DL (ref 0.9–1.3)
EOSINOPHILS ABSOLUTE: 0 K/UL (ref 0–0.6)
EOSINOPHILS RELATIVE PERCENT: 0.6 %
GFR AFRICAN AMERICAN: >60
GFR NON-AFRICAN AMERICAN: >60
GLUCOSE BLD-MCNC: 120 MG/DL (ref 70–99)
HCT VFR BLD CALC: 45.6 % (ref 40.5–52.5)
HEMOGLOBIN: 15.2 G/DL (ref 13.5–17.5)
LYMPHOCYTES ABSOLUTE: 1.6 K/UL (ref 1–5.1)
LYMPHOCYTES RELATIVE PERCENT: 18.7 %
MAGNESIUM: 1.9 MG/DL (ref 1.8–2.4)
MCH RBC QN AUTO: 28.6 PG (ref 26–34)
MCHC RBC AUTO-ENTMCNC: 33.4 G/DL (ref 31–36)
MCV RBC AUTO: 85.7 FL (ref 80–100)
MONOCYTES ABSOLUTE: 1 K/UL (ref 0–1.3)
MONOCYTES RELATIVE PERCENT: 11.6 %
NEUTROPHILS ABSOLUTE: 5.7 K/UL (ref 1.7–7.7)
NEUTROPHILS RELATIVE PERCENT: 68.6 %
PDW BLD-RTO: 15.2 % (ref 12.4–15.4)
PHOSPHORUS: 2.3 MG/DL (ref 2.5–4.9)
PLATELET # BLD: 311 K/UL (ref 135–450)
PMV BLD AUTO: 8 FL (ref 5–10.5)
POTASSIUM SERPL-SCNC: 3.5 MMOL/L (ref 3.5–5.1)
RBC # BLD: 5.32 M/UL (ref 4.2–5.9)
SODIUM BLD-SCNC: 135 MMOL/L (ref 136–145)
WBC # BLD: 8.4 K/UL (ref 4–11)

## 2021-06-23 PROCEDURE — 2000000000 HC ICU R&B

## 2021-06-23 PROCEDURE — 6360000002 HC RX W HCPCS: Performed by: INTERNAL MEDICINE

## 2021-06-23 PROCEDURE — 6370000000 HC RX 637 (ALT 250 FOR IP): Performed by: INTERNAL MEDICINE

## 2021-06-23 PROCEDURE — 2500000003 HC RX 250 WO HCPCS: Performed by: INTERNAL MEDICINE

## 2021-06-23 PROCEDURE — 85025 COMPLETE CBC W/AUTO DIFF WBC: CPT

## 2021-06-23 PROCEDURE — 80048 BASIC METABOLIC PNL TOTAL CA: CPT

## 2021-06-23 PROCEDURE — 2580000003 HC RX 258: Performed by: INTERNAL MEDICINE

## 2021-06-23 PROCEDURE — 99233 SBSQ HOSP IP/OBS HIGH 50: CPT | Performed by: INTERNAL MEDICINE

## 2021-06-23 PROCEDURE — C9113 INJ PANTOPRAZOLE SODIUM, VIA: HCPCS | Performed by: INTERNAL MEDICINE

## 2021-06-23 PROCEDURE — 84100 ASSAY OF PHOSPHORUS: CPT

## 2021-06-23 PROCEDURE — 83735 ASSAY OF MAGNESIUM: CPT

## 2021-06-23 RX ORDER — NIFEDIPINE 30 MG/1
60 TABLET, EXTENDED RELEASE ORAL DAILY
Status: DISCONTINUED | OUTPATIENT
Start: 2021-06-23 | End: 2021-06-25 | Stop reason: HOSPADM

## 2021-06-23 RX ADMIN — ACETAMINOPHEN 650 MG: 325 TABLET ORAL at 08:31

## 2021-06-23 RX ADMIN — ENOXAPARIN SODIUM 40 MG: 40 INJECTION SUBCUTANEOUS at 20:18

## 2021-06-23 RX ADMIN — HYDRALAZINE HYDROCHLORIDE 50 MG: 25 TABLET, FILM COATED ORAL at 22:19

## 2021-06-23 RX ADMIN — NITROGLYCERIN 15 MCG/MIN: 20 INJECTION INTRAVENOUS at 05:18

## 2021-06-23 RX ADMIN — ACETAMINOPHEN 650 MG: 325 TABLET ORAL at 20:18

## 2021-06-23 RX ADMIN — LABETALOL HYDROCHLORIDE 10 MG: 5 INJECTION INTRAVENOUS at 17:56

## 2021-06-23 RX ADMIN — LABETALOL HYDROCHLORIDE 10 MG: 5 INJECTION INTRAVENOUS at 11:49

## 2021-06-23 RX ADMIN — ATORVASTATIN CALCIUM 40 MG: 40 TABLET, FILM COATED ORAL at 20:19

## 2021-06-23 RX ADMIN — Medication 10 ML: at 20:19

## 2021-06-23 RX ADMIN — HYDROMORPHONE HYDROCHLORIDE 0.5 MG: 1 INJECTION, SOLUTION INTRAMUSCULAR; INTRAVENOUS; SUBCUTANEOUS at 06:05

## 2021-06-23 RX ADMIN — LOSARTAN POTASSIUM 100 MG: 100 TABLET, FILM COATED ORAL at 08:32

## 2021-06-23 RX ADMIN — ASPIRIN 81 MG: 81 TABLET, COATED ORAL at 08:32

## 2021-06-23 RX ADMIN — HYDRALAZINE HYDROCHLORIDE 10 MG: 20 INJECTION INTRAMUSCULAR; INTRAVENOUS at 01:09

## 2021-06-23 RX ADMIN — PANTOPRAZOLE SODIUM 40 MG: 40 INJECTION, POWDER, FOR SOLUTION INTRAVENOUS at 08:32

## 2021-06-23 RX ADMIN — CLONIDINE HYDROCHLORIDE 0.1 MG: 0.1 TABLET ORAL at 20:19

## 2021-06-23 RX ADMIN — HYDROMORPHONE HYDROCHLORIDE 0.5 MG: 1 INJECTION, SOLUTION INTRAMUSCULAR; INTRAVENOUS; SUBCUTANEOUS at 15:04

## 2021-06-23 RX ADMIN — HYDROMORPHONE HYDROCHLORIDE 0.5 MG: 1 INJECTION, SOLUTION INTRAMUSCULAR; INTRAVENOUS; SUBCUTANEOUS at 00:45

## 2021-06-23 RX ADMIN — LABETALOL HYDROCHLORIDE 10 MG: 5 INJECTION INTRAVENOUS at 03:25

## 2021-06-23 RX ADMIN — NIFEDIPINE 60 MG: 30 TABLET, FILM COATED, EXTENDED RELEASE ORAL at 08:59

## 2021-06-23 RX ADMIN — CLONIDINE HYDROCHLORIDE 0.1 MG: 0.1 TABLET ORAL at 08:32

## 2021-06-23 RX ADMIN — HYDRALAZINE HYDROCHLORIDE 10 MG: 20 INJECTION INTRAMUSCULAR; INTRAVENOUS at 10:02

## 2021-06-23 RX ADMIN — PROMETHAZINE HYDROCHLORIDE 6.25 MG: 25 INJECTION INTRAMUSCULAR; INTRAVENOUS at 03:54

## 2021-06-23 RX ADMIN — ACETAMINOPHEN 650 MG: 325 TABLET ORAL at 03:25

## 2021-06-23 RX ADMIN — CLONIDINE HYDROCHLORIDE 0.1 MG: 0.1 TABLET ORAL at 13:45

## 2021-06-23 RX ADMIN — HYDRALAZINE HYDROCHLORIDE 50 MG: 25 TABLET, FILM COATED ORAL at 06:06

## 2021-06-23 RX ADMIN — HYDRALAZINE HYDROCHLORIDE 50 MG: 25 TABLET, FILM COATED ORAL at 13:44

## 2021-06-23 ASSESSMENT — PAIN - FUNCTIONAL ASSESSMENT
PAIN_FUNCTIONAL_ASSESSMENT: PREVENTS OR INTERFERES SOME ACTIVE ACTIVITIES AND ADLS

## 2021-06-23 ASSESSMENT — PAIN SCALES - GENERAL
PAINLEVEL_OUTOF10: 9
PAINLEVEL_OUTOF10: 9
PAINLEVEL_OUTOF10: 4
PAINLEVEL_OUTOF10: 0
PAINLEVEL_OUTOF10: 9
PAINLEVEL_OUTOF10: 0
PAINLEVEL_OUTOF10: 0
PAINLEVEL_OUTOF10: 7
PAINLEVEL_OUTOF10: 7
PAINLEVEL_OUTOF10: 0
PAINLEVEL_OUTOF10: 0
PAINLEVEL_OUTOF10: 9
PAINLEVEL_OUTOF10: 0
PAINLEVEL_OUTOF10: 9
PAINLEVEL_OUTOF10: 1
PAINLEVEL_OUTOF10: 9

## 2021-06-23 ASSESSMENT — PAIN DESCRIPTION - PROGRESSION
CLINICAL_PROGRESSION: GRADUALLY IMPROVING
CLINICAL_PROGRESSION: NOT CHANGED
CLINICAL_PROGRESSION: GRADUALLY IMPROVING

## 2021-06-23 ASSESSMENT — PAIN DESCRIPTION - ORIENTATION
ORIENTATION: MID

## 2021-06-23 ASSESSMENT — PAIN DESCRIPTION - PAIN TYPE
TYPE: ACUTE PAIN
TYPE: ACUTE PAIN
TYPE: CHRONIC PAIN
TYPE: ACUTE PAIN

## 2021-06-23 ASSESSMENT — PAIN DESCRIPTION - LOCATION
LOCATION: HEAD

## 2021-06-23 ASSESSMENT — PAIN DESCRIPTION - DESCRIPTORS
DESCRIPTORS: HEADACHE

## 2021-06-23 ASSESSMENT — PAIN DESCRIPTION - ONSET
ONSET: ON-GOING

## 2021-06-23 ASSESSMENT — PAIN DESCRIPTION - FREQUENCY
FREQUENCY: INTERMITTENT
FREQUENCY: CONTINUOUS

## 2021-06-23 NOTE — PROGRESS NOTES
PRN  0.9 % sodium chloride infusion, PRN  ondansetron (ZOFRAN-ODT) disintegrating tablet 4 mg, Q8H PRN   Or  ondansetron (ZOFRAN) injection 4 mg, Q6H PRN  polyethylene glycol (GLYCOLAX) packet 17 g, Daily PRN  enoxaparin (LOVENOX) injection 40 mg, Daily  aspirin EC tablet 81 mg, Daily   Or  aspirin suppository 300 mg, Daily  acetaminophen (TYLENOL) tablet 650 mg, Q4H PRN   Or  acetaminophen (TYLENOL) suppository 650 mg, Q4H PRN  atorvastatin (LIPITOR) tablet 40 mg, Nightly  hydrALAZINE (APRESOLINE) tablet 50 mg, 3 times per day  diphenhydrAMINE (BENADRYL) tablet 25 mg, Q4H PRN        Physical exam:     Vitals:  BP (!) 186/103   Pulse 69   Temp 97.8 °F (36.6 °C) (Temporal)   Resp 16   Ht 6' (1.829 m)   Wt 240 lb 11.9 oz (109.2 kg)   SpO2 98%   BMI 32.65 kg/m²   Constitutional:  OAA X3 NAD  Skin: no rash, turgor wnl  Heent:  eomi, mmm  Neck: no bruits or jvd noted  Cardiovascular:  S1, S2 without m/r/g  Respiratory: CTA B without w/r/r  Abdomen:  +bs, soft, nt, nd  Ext: no  lower extremity edema  Psychiatric: mood and affect appropriate  Musculoskeletal:  Rom, muscular strength intact    Labs:  CBC:   Recent Labs     06/21/21  0416 06/22/21  0419 06/23/21  0310   WBC 6.1 7.5 8.4   HGB 16.0 15.4 15.2    328 311     BMP:    Recent Labs     06/21/21  0416 06/22/21  0419 06/23/21  0310    136 135*   K 4.2 3.6 3.5    101 102   CO2 21 21 21   BUN 16 21* 16   CREATININE 1.3 1.5* 1.2   GLUCOSE 120* 142* 120*     Ca/Mg/Phos:   Recent Labs     06/21/21  0416 06/22/21  0419 06/23/21  0310   CALCIUM 9.6 9.2 8.9   MG  --  2.00 1.90   PHOS  --  3.6 2.3*     Hepatic:   Recent Labs     06/20/21  1023   AST 22   ALT 40   BILITOT 0.5   ALKPHOS 95     Troponin:   Recent Labs     06/20/21  1023   TROPONINI <0.01     BNP: No results for input(s): BNP in the last 72 hours.   Lipids:   Recent Labs     06/21/21  0416   CHOL 199   TRIG 67   HDL 47   LDLCALC 139*   LABVLDL 13     ABGs: No results for input(s): PHART, PO2ART, HLT6DPQ in the last 72 hours. INR:   Recent Labs     06/20/21  1023   INR 1.01     UA:  Recent Labs     06/20/21  1715   COLORU YELLOW   CLARITYU Clear   GLUCOSEU Negative   BILIRUBINUR Negative   KETUA Negative   SPECGRAV >1.030   BLOODU Negative   PHUR 7.5   PROTEINU Negative   UROBILINOGEN 0.2   NITRU Negative   LEUKOCYTESUR Negative   LABMICR Not Indicated   Dewayne Ladan NotGiven      Urine Microscopic: No results for input(s): LABCAST, BACTERIA, COMU, HYALCAST, WBCUA, RBCUA, EPIU in the last 72 hours. Urine Culture: No results for input(s): LABURIN in the last 72 hours. Urine Chemistry: No results for input(s): Cathern Melara, PROTEINUR, NAUR in the last 72 hours. IMAGING:  MRI brain without contrast   Final Result   No acute intracranial abnormality. Mild chronic microvascular disease. Sinus mucosal disease. CTA HEAD NECK W CONTRAST   Final Result   1. Severe short-segment stenosis at the origin of the left vertebral artery. 2. Otherwise, no flow limiting stenosis of the cervical carotid/vertebral   arteries. 3. No focal stenosis of the vgcgqh-sg-Pwbiaz. CT HEAD WO CONTRAST   Final Result   No acute intracranial abnormality. XR CHEST PORTABLE   Final Result   No significant findings in the chest.                 Assessment/Plan :      1. GORDO likely 2/2 HTN emergency   Gordo resolving     Also received contrast   Monitor renal function   Recommend to dose adjust all medications  based on renal functions  Maintain SBP> 90 mmHg   Daily weights   AVOID NSAIDs  Avoid Nephrotoxins  Monitor Intake/Output  Call if significant decrease in urine output     2. HTN. On hydralazine, losartan,   Clonidine. Not allergic to amlodipine   Confirmed with him.  He had allergic reaction to lisinopril   Will start nifedipine XL and try to taper off nitro drip       Will recommend to slowly bring the BP down   Avoid dropping SBP < 140 mmHG today     Hypertension education given ?Lose weight (if you are overweight)  ? Choose a diet low in fat and rich in fruits, vegetables, and low-fat dairy products  ? Reduce the amount of salt you eat, avoid sodas. ?Do something active for at least 30 minutes a day on most days of the week  ? Cut down on alcohol (if you drink more than 2 alcoholic drinks per day), if you smoke then try to quit.                 D/w primary team      Thank you for allowing us to participate in care of [address] [place] [address]         Electronically signed by: Wale Fonseca MD, 6/23/2021, 10:02 AM      [facility] [address]  Office : [phone]  Fax :[phone]

## 2021-06-23 NOTE — PLAN OF CARE
Problem: Falls - Risk of:  Goal: Will remain free from falls  Description: Will remain free from falls  6/22/2021 2017 by Chilo Neal RN  Outcome: Ongoing  Goal: Absence of physical injury  Description: Absence of physical injury  6/22/2021 2017 by Chilo Neal RN  Outcome: Ongoing     Problem: Pain:  Description: Pain management should include both nonpharmacologic and pharmacologic interventions.   Goal: Pain level will decrease  Description: Pain level will decrease  6/22/2021 2017 by Chilo Neal RN  Outcome: Ongoing  Goal: Control of acute pain  Description: Control of acute pain  6/22/2021 2017 by Chilo Neal RN  Outcome: Ongoing  Goal: Control of chronic pain  Description: Control of chronic pain  6/22/2021 2017 by Chilo Neal RN  Outcome: Ongoing     Problem: Musculor/Skeletal Functional Status  Goal: Highest potential functional level  6/22/2021 2017 by Chilo Neal RN  Outcome: Ongoing  Goal: Absence of falls  6/22/2021 2017 by Chilo Neal RN  Outcome: Ongoing

## 2021-06-23 NOTE — PROGRESS NOTES
Shift assessment complete, see flowsheets. Meds given and nitro gtt increased, see MAR. Wife at bedside, questions answered. Bed in lowest position, call light in reach, pt resting in bed.

## 2021-06-23 NOTE — PROGRESS NOTES
labetalol, HYDROmorphone, sodium chloride flush, sodium chloride, ondansetron **OR** ondansetron, polyethylene glycol, acetaminophen **OR** acetaminophen, diphenhydrAMINE      Intake/Output Summary (Last 24 hours) at 6/23/2021 1349  Last data filed at 6/23/2021 0600  Gross per 24 hour   Intake 1160.71 ml   Output 850 ml   Net 310.71 ml       Physical Exam Performed:    BP (!) 171/92   Pulse 77   Temp 98.2 °F (36.8 °C) (Temporal)   Resp 17   Ht 6' (1.829 m)   Wt 240 lb 11.9 oz (109.2 kg)   SpO2 98%   BMI 32.65 kg/m²     General appearance: No apparent distress, appears stated age and cooperative. Respiratory:  Normal respiratory effort. Clear to auscultation, bilaterally without Rales/Wheezes/Rhonchi. Cardiovascular: Regular rate and rhythm with normal S1/S2 without murmurs, rubs or gallops. Abdomen: Soft, non-tender, non-distended with normal bowel sounds. Musculoskeletal: No clubbing, cyanosis or edema bilaterally. Full range of motion without deformity. Neurologic:  Neurovascularly intact without any focal sensory/motor deficits. Cranial nerves: II-XII intact, grossly non-focal.  Psychiatric: Calm and cooperative; clear thought process    Labs:   Recent Labs     06/21/21  0416 06/22/21  0419 06/23/21  0310   WBC 6.1 7.5 8.4   HGB 16.0 15.4 15.2   HCT 47.7 45.2 45.6    328 311     Recent Labs     06/21/21  0416 06/22/21  0419 06/23/21  0310    136 135*   K 4.2 3.6 3.5    101 102   CO2 21 21 21   BUN 16 21* 16   CREATININE 1.3 1.5* 1.2   CALCIUM 9.6 9.2 8.9   PHOS  --  3.6 2.3*     No results for input(s): AST, ALT, BILIDIR, BILITOT, ALKPHOS in the last 72 hours. No results for input(s): INR in the last 72 hours. No results for input(s): Lorayne Smiley in the last 72 hours.     Urinalysis:      Lab Results   Component Value Date    NITRU Negative 06/20/2021    WBCUA 1 01/03/2018    RBCUA 1 01/03/2018    BLOODU Negative 06/20/2021    SPECGRAV >1.030 06/20/2021    GLUCOSEU Negative 06/20/2021       Radiology:  MRI brain without contrast   Final Result   No acute intracranial abnormality. Mild chronic microvascular disease. Sinus mucosal disease. CTA HEAD NECK W CONTRAST   Final Result   1. Severe short-segment stenosis at the origin of the left vertebral artery. 2. Otherwise, no flow limiting stenosis of the cervical carotid/vertebral   arteries. 3. No focal stenosis of the [id number]-hv-Xtnoav. CT HEAD WO CONTRAST   Final Result   No acute intracranial abnormality. XR CHEST PORTABLE   Final Result   No significant findings in the chest.                 Assessment/Plan:    Active Hospital Problems    Diagnosis     Dizziness [R42]     Vertebral artery stenosis, left [I65.02]     Hypertensive urgency [I16.0]     HTN (hypertension), benign [I10]      Hypertensive emergency : BP improving  currently on clonidine, hydralazine and losartan   taper nitroglycerin drip; goal -150  Added labetalol as needed for systolic BP> 158  Metoprolol on hold for now; concerns for bradycardia  Started on nifedipine     Vertigo: Resolved  Ruled out CVA  MRI brain negative for acute events     FATUMA: Secondary to hypertensive emergency  Creatinine elevated to 1.5: Trended down to 1.2  Nephrology consulted; appreciate their recommendations  Avoid nephrotoxins  Continue IV fluids   Strict intake and output  Daily weights     Severe vertebral artery stenosis on CTA head neck  Vascular surgery consulted; no surgical intervention needed per their recs    ASCVD score 14%  Moderate to high intensity statin recommended  We will continue Lipitor on discharge      DVT Prophylaxis: Lovenox  Diet: ADULT DIET; Regular;  No Added Salt (3-4 gm)  Code Status: Full Code      Dispo : Home when medically stable    Electronically signed by Vel Manley MD on 6/23/2021 at 1:49 PM

## 2021-06-24 LAB
ANION GAP SERPL CALCULATED.3IONS-SCNC: 14 MMOL/L (ref 3–16)
BASOPHILS ABSOLUTE: 0.1 K/UL (ref 0–0.2)
BASOPHILS RELATIVE PERCENT: 0.9 %
BUN BLDV-MCNC: 13 MG/DL (ref 7–20)
CALCIUM SERPL-MCNC: 9 MG/DL (ref 8.3–10.6)
CHLORIDE BLD-SCNC: 102 MMOL/L (ref 99–110)
CO2: 20 MMOL/L (ref 21–32)
CREAT SERPL-MCNC: 1.4 MG/DL (ref 0.9–1.3)
EOSINOPHILS ABSOLUTE: 0.2 K/UL (ref 0–0.6)
EOSINOPHILS RELATIVE PERCENT: 3.4 %
GFR AFRICAN AMERICAN: >60
GFR NON-AFRICAN AMERICAN: 54
GLUCOSE BLD-MCNC: 125 MG/DL (ref 70–99)
HCT VFR BLD CALC: 45 % (ref 40.5–52.5)
HEMOGLOBIN: 15.2 G/DL (ref 13.5–17.5)
LYMPHOCYTES ABSOLUTE: 2.1 K/UL (ref 1–5.1)
LYMPHOCYTES RELATIVE PERCENT: 31.6 %
MAGNESIUM: 2 MG/DL (ref 1.8–2.4)
MCH RBC QN AUTO: 29.2 PG (ref 26–34)
MCHC RBC AUTO-ENTMCNC: 33.9 G/DL (ref 31–36)
MCV RBC AUTO: 86.1 FL (ref 80–100)
MONOCYTES ABSOLUTE: 0.8 K/UL (ref 0–1.3)
MONOCYTES RELATIVE PERCENT: 11.4 %
NEUTROPHILS ABSOLUTE: 3.5 K/UL (ref 1.7–7.7)
NEUTROPHILS RELATIVE PERCENT: 52.7 %
PDW BLD-RTO: 15.1 % (ref 12.4–15.4)
PHOSPHORUS: 3.1 MG/DL (ref 2.5–4.9)
PLATELET # BLD: 298 K/UL (ref 135–450)
PMV BLD AUTO: 7.9 FL (ref 5–10.5)
POTASSIUM SERPL-SCNC: 3.4 MMOL/L (ref 3.5–5.1)
RBC # BLD: 5.22 M/UL (ref 4.2–5.9)
SODIUM BLD-SCNC: 136 MMOL/L (ref 136–145)
WBC # BLD: 6.6 K/UL (ref 4–11)

## 2021-06-24 PROCEDURE — 80048 BASIC METABOLIC PNL TOTAL CA: CPT

## 2021-06-24 PROCEDURE — 6370000000 HC RX 637 (ALT 250 FOR IP): Performed by: INTERNAL MEDICINE

## 2021-06-24 PROCEDURE — C9113 INJ PANTOPRAZOLE SODIUM, VIA: HCPCS | Performed by: INTERNAL MEDICINE

## 2021-06-24 PROCEDURE — 6360000002 HC RX W HCPCS: Performed by: INTERNAL MEDICINE

## 2021-06-24 PROCEDURE — 85025 COMPLETE CBC W/AUTO DIFF WBC: CPT

## 2021-06-24 PROCEDURE — 99232 SBSQ HOSP IP/OBS MODERATE 35: CPT | Performed by: INTERNAL MEDICINE

## 2021-06-24 PROCEDURE — 2580000003 HC RX 258: Performed by: INTERNAL MEDICINE

## 2021-06-24 PROCEDURE — 2000000000 HC ICU R&B

## 2021-06-24 PROCEDURE — 84100 ASSAY OF PHOSPHORUS: CPT

## 2021-06-24 PROCEDURE — 83735 ASSAY OF MAGNESIUM: CPT

## 2021-06-24 RX ORDER — LOSARTAN POTASSIUM 25 MG/1
50 TABLET ORAL DAILY
Status: DISCONTINUED | OUTPATIENT
Start: 2021-06-24 | End: 2021-06-24

## 2021-06-24 RX ADMIN — ATORVASTATIN CALCIUM 40 MG: 40 TABLET, FILM COATED ORAL at 19:57

## 2021-06-24 RX ADMIN — Medication 10 ML: at 09:41

## 2021-06-24 RX ADMIN — HYDRALAZINE HYDROCHLORIDE 50 MG: 25 TABLET, FILM COATED ORAL at 05:56

## 2021-06-24 RX ADMIN — PANTOPRAZOLE SODIUM 40 MG: 40 INJECTION, POWDER, FOR SOLUTION INTRAVENOUS at 09:41

## 2021-06-24 RX ADMIN — Medication 10 ML: at 19:57

## 2021-06-24 RX ADMIN — HYDRALAZINE HYDROCHLORIDE 50 MG: 25 TABLET, FILM COATED ORAL at 22:04

## 2021-06-24 RX ADMIN — ASPIRIN 81 MG: 81 TABLET, COATED ORAL at 09:41

## 2021-06-24 RX ADMIN — ACETAMINOPHEN 650 MG: 325 TABLET ORAL at 16:32

## 2021-06-24 RX ADMIN — ENOXAPARIN SODIUM 40 MG: 40 INJECTION SUBCUTANEOUS at 19:56

## 2021-06-24 ASSESSMENT — PAIN SCALES - GENERAL
PAINLEVEL_OUTOF10: 9
PAINLEVEL_OUTOF10: 9
PAINLEVEL_OUTOF10: 0
PAINLEVEL_OUTOF10: 0

## 2021-06-24 NOTE — CARE COORDINATION
CM met with patient, he is aware that his wife is going to get him a PCP appointment for follow up care. CM has requested meds to beds at discharge due to the pending Medicaid status. Patient aware.     NGHIA MajorN, CCM, RN  [facility]  [phone]

## 2021-06-24 NOTE — PROGRESS NOTES
Awake, resting quietly in bed, voicing no complaints. Wife at bedside. VSS.   Assessment completed, see flow [email or web address]

## 2021-06-24 NOTE — PROGRESS NOTES
Office : [phone]     Fax :[phone]       Nephrology progress  Note      Patient's Name: Jennifer Hilliard  8:49 AM  6/24/2021    Reason for Consult:  HTN urgency       Chief Complaint:    Chief Complaint   Patient presents with    Headache     Patient presents with headache and dizziness that started 2 days ago. Ran out of his BP medications 2 weeks ago. Insurance changed and unable to find PCP yet. History of Present iIlness:    Jennifer Hilliard is a 50 y.o. male with  H/o HTN who was admitted with c/o headaches and had elevated BP of 213/122 . He was taking losartan and metoprolol but was not able to take since ran out of meds. He mentioned he lost his job because of [employer] and has no PCP now. BP has slowly improved overnight   His creatinine is elevated at 1.5   At admission was 1.3   Has CTA and exposure to contrast     Interval hx     BP low stable today   Creatinine 1.2 --> 1.4       I/O last 3 completed shifts: In: 784.6 [P.O.:720;  I.V.:64.6]  Out: 36 [Urine:925]    Past Medical History:   Diagnosis Date    Hypertension          Allergies:  Codeine, Lisinopril, and Other    Current Medications:    losartan (COZAAR) tablet 50 mg, Daily  NIFEdipine (PROCARDIA XL) extended release tablet 60 mg, Daily  promethazine (PHENERGAN) injection 6.25 mg, Q6H PRN  hydrALAZINE (APRESOLINE) injection 10 mg, Q4H PRN  labetalol (NORMODYNE;TRANDATE) injection 10 mg, Q6H PRN  pantoprazole (PROTONIX) injection 40 mg, Daily  HYDROmorphone HCl PF (DILAUDID) injection 0.5 mg, Q4H PRN  nitroGLYCERIN 50 mg in dextrose 5% 250 mL infusion, Continuous  sodium chloride flush 0.9 % injection 5-40 mL, 2 times per day  sodium chloride flush 0.9 % injection 5-40 mL, PRN  0.9 % sodium chloride infusion, PRN  ondansetron (ZOFRAN-ODT) disintegrating tablet 4 mg, Q8H PRN   Or  ondansetron (ZOFRAN) injection 4 mg, Q6H PRN  polyethylene glycol (GLYCOLAX) packet 17 g, Daily PRN  enoxaparin (LOVENOX) injection 40 mg, Daily  aspirin EC tablet 81 mg, Daily   Or  aspirin suppository 300 mg, Daily  acetaminophen (TYLENOL) tablet 650 mg, Q4H PRN   Or  acetaminophen (TYLENOL) suppository 650 mg, Q4H PRN  atorvastatin (LIPITOR) tablet 40 mg, Nightly  hydrALAZINE (APRESOLINE) tablet 50 mg, 3 times per day  diphenhydrAMINE (BENADRYL) tablet 25 mg, Q4H PRN        Physical exam:     Vitals:  /83   Pulse 103   Temp 97.3 °F (36.3 °C) (Temporal)   Resp 18   Ht 6' (1.829 m)   Wt 240 lb 11.9 oz (109.2 kg)   SpO2 96%   BMI 32.65 kg/m²   Constitutional:  OAA X3 NAD  Skin: no rash, turgor wnl  Heent:  eomi, mmm  Neck: no bruits or jvd noted  Cardiovascular:  S1, S2 without m/r/g  Respiratory: CTA B without w/r/r  Abdomen:  +bs, soft, nt, nd  Ext: no  lower extremity edema  Psychiatric: mood and affect appropriate  Musculoskeletal:  Rom, muscular strength intact    Labs:  CBC:   Recent Labs     06/22/21 0419 06/23/21  0310 06/24/21  0645   WBC 7.5 8.4 6.6   HGB 15.4 15.2 15.2    311 298     BMP:    Recent Labs     06/22/21 0419 06/23/21  0310 06/24/21  0645    135* 136   K 3.6 3.5 3.4*    102 102   CO2 21 21 20*   BUN 21* 16 13   CREATININE 1.5* 1.2 1.4*   GLUCOSE 142* 120* 125*     Ca/Mg/Phos:   Recent Labs     06/22/21 0419 06/23/21  0310 06/24/21  0645   CALCIUM 9.2 8.9 9.0   MG 2.00 1.90 2.00   PHOS 3.6 2.3* 3.1     Hepatic:   No results for input(s): AST, ALT, ALB, BILITOT, ALKPHOS in the last 72 hours. Troponin:   No results for input(s): TROPONINI in the last 72 hours. BNP: No results for input(s): BNP in the last 72 hours. Lipids:   No results for input(s): CHOL, TRIG, HDL, LDLCALC, LABVLDL in the last 72 hours. ABGs: No results for input(s): PHART, PO2ART, [id number] in the last 72 hours.   INR: No results for input(s): INR in the last 72 hours. UA:  No results for input(s): Shelby Piyush, GLUCOSEU, BILIRUBINUR, KETUA, SPECGRAV, BLOODU, PHUR, PROTEINU, UROBILINOGEN, NITRU, LEUKOCYTESUR, Joycelyn Coyer in the last 72 hours. Urine Microscopic: No results for input(s): LABCAST, BACTERIA, COMU, HYALCAST, WBCUA, RBCUA, EPIU in the last 72 hours. Urine Culture: No results for input(s): LABURIN in the last 72 hours. Urine Chemistry: No results for input(s): Elyce Frankel, PROTEINUR, NAUR in the last 72 hours. IMAGING:  MRI brain without contrast   Final Result   No acute intracranial abnormality. Mild chronic microvascular disease. Sinus mucosal disease. CTA HEAD NECK W CONTRAST   Final Result   1. Severe short-segment stenosis at the origin of the left vertebral artery. 2. Otherwise, no flow limiting stenosis of the cervical carotid/vertebral   arteries. 3. No focal stenosis of the [id number]. CT HEAD WO CONTRAST   Final Result   No acute intracranial abnormality. XR CHEST PORTABLE   Final Result   No significant findings in the chest.                 Assessment/Plan :      1. FATUMA likely 2/2 HTN emergency   Creat increased likely from normotensive ischemic injury     Monitor renal function   Recommend to dose adjust all medications  based on renal functions  Maintain SBP> 90 mmHg   Daily weights   AVOID NSAIDs  Avoid Nephrotoxins  Monitor Intake/Output  Call if significant decrease in urine output     2. HTN. BP low now   Will stop clonidine and losartan   Hold BP meds for SBP < 120 mm HG       Will recommend to slowly bring the BP down   Avoid dropping SBP < 140 mmHG today     Hypertension education given     ? Lose weight (if you are overweight)  ? Choose a diet low in fat and rich in fruits, vegetables, and low-fat dairy products  ? Reduce the amount of salt you eat, avoid sodas.   ?Do something active for at least 30 minutes a day on most days of the week  ? Cut down on alcohol (if you drink more than 2 alcoholic drinks per day), if you smoke then try to quit.                 D/w primary team      Thank you for allowing us to participate in care of Beulah Beaver         Electronically signed by: Jesus Dolan MD, 6/24/2021, 8:49 AM      [facility]  [address]  Office : [phone]  Fax :[phone]

## 2021-06-24 NOTE — PROGRESS NOTES
Physician Progress Note      Phillip Cristobal  CSN #:                  [id number]  :                       1975  ADMIT DATE:       2021 10:05 AM  DISCH DATE:  RESPONDING  PROVIDER #:        Howard Del Valle MD        QUERY TEXT:    Stage of Chronic Kidney Disease: Please provide further specificity, if known. Clinical indicators include: bun, creatinine, ckd, reatinine, reatinine   creatinine  Options provided:  -- Chronic kidney disease stage 1  -- Chronic kidney disease stage 2  -- Chronic kidney disease stage 3  -- Chronic kidney disease stage 3a  -- Chronic kidney disease stage 3b  -- Chronic kidney disease stage 4  -- Chronic kidney disease stage 5  -- Chronic kidney disease stage 5, requiring dialysis  -- End stage renal disease  -- Other - I will add my own diagnosis  -- Disagree - Not applicable / Not valid  -- Disagree - Clinically Unable to determine / Unknown        PROVIDER RESPONSE TEXT:    The patient has chronic kidney disease stage 2.       Electronically signed by:  Howard Del Valle MD 2021 2:33 PM

## 2021-06-24 NOTE — PROGRESS NOTES
Hospitalist Progress Note      PCP: Timi Drew MD    Date of Admission: 6/20/2021    Chief Complaint: Vertigo    Hospital Course: 50 y.o. male with PMHx  of hypertension presented for evaluation of vertigo for the past 2 to 3 days. Patient describes room spinning sensation and balance issues on and off. He has no other associated symptoms. Patient denies headache, blurry vision, chest pain, palpitations, shortness of breath, lower extremity swelling, nausea vomiting abdominal pain diarrhea. On admission, patient found to have significantly elevated blood pressures as high as 212/122. Significant improvement after administration of IV hydralazine at the ED.  CT& CTA head neck without acute ischemic changes or bleed, however, notable for severe stenosis of vertebral artery. At home patient is on losartan and metoprolol, however, he does not know the dosing.      Interval history  Patient seen and examined today  Overnight events and interval ancillary notes reviewed  Patient up and walking around reported that his headache has partially improved  denied any dizziness, chest pain, shortness of breath or palpitations  Weaned off of the nitro drip; blood pressure control with addition of nifedipine  Clonidine and losartan discontinued  Creatinine trended up to 1.4      Medications:  Reviewed    Infusion Medications    nitroGLYCERIN Stopped (06/23/21 1346)    sodium chloride       Scheduled Medications    NIFEdipine  60 mg Oral Daily    cloNIDine  0.1 mg Oral TID    pantoprazole  40 mg Intravenous Daily    losartan  100 mg Oral Daily    sodium chloride flush  5-40 mL Intravenous 2 times per day    enoxaparin  40 mg Subcutaneous Daily    aspirin  81 mg Oral Daily    Or    aspirin  300 mg Rectal Daily    atorvastatin  40 mg Oral Nightly    hydrALAZINE  50 mg Oral 3 times per day     PRN Meds: promethazine, hydrALAZINE, labetalol, HYDROmorphone, sodium chloride flush, sodium chloride, ondansetron **OR** ondansetron, polyethylene glycol, acetaminophen **OR** acetaminophen, diphenhydrAMINE      Intake/Output Summary (Last 24 hours) at 6/24/2021 0809  Last data filed at 6/23/2021 2018  Gross per 24 hour   Intake 784.58 ml   Output 925 ml   Net -140.42 ml       Physical Exam Performed:    /64   Pulse 87   Temp 98 °F (36.7 °C) (Oral)   Resp 16   Ht 6' (1.829 m)   Wt 240 lb 11.9 oz (109.2 kg)   SpO2 95%   BMI 32.65 kg/m²     General appearance: No apparent distress, appears stated age and cooperative. Respiratory:  Normal respiratory effort. Clear to auscultation, bilaterally without Rales/Wheezes/Rhonchi. Cardiovascular: Regular rate and rhythm with normal S1/S2 without murmurs, rubs or gallops. Abdomen: Soft, non-tender, non-distended with normal bowel sounds. Musculoskeletal: No clubbing, cyanosis or edema bilaterally. Full range of motion without deformity. Neurologic:  Neurovascularly intact without any focal sensory/motor deficits. Cranial nerves: II-XII intact, grossly non-focal.  Psychiatric: Calm and cooperative; clear thought process    Labs:   Recent Labs     06/22/21  0419 06/23/21  0310 06/24/21  0645   WBC 7.5 8.4 6.6   HGB 15.4 15.2 15.2   HCT 45.2 45.6 45.0    311 298     Recent Labs     06/22/21  0419 06/23/21  0310 06/24/21  0645    135* 136   K 3.6 3.5 3.4*    102 102   CO2 21 21 20*   BUN 21* 16 13   CREATININE 1.5* 1.2 1.4*   CALCIUM 9.2 8.9 9.0   PHOS 3.6 2.3* 3.1     No results for input(s): AST, ALT, BILIDIR, BILITOT, ALKPHOS in the last 72 hours. No results for input(s): INR in the last 72 hours. No results for input(s): Rejeana Kemps in the last 72 hours.     Urinalysis:      Lab Results   Component Value Date    NITRU Negative 06/20/2021    WBCUA 1 01/03/2018    RBCUA 1 01/03/2018    BLOODU Negative 06/20/2021    SPECGRAV >1.030 06/20/2021    GLUCOSEU Negative 06/20/2021       Radiology:  MRI brain without contrast   Final Result   No acute intracranial abnormality. Mild chronic microvascular disease. Sinus mucosal disease. CTA HEAD NECK W CONTRAST   Final Result   1. Severe short-segment stenosis at the origin of the left vertebral artery. 2. Otherwise, no flow limiting stenosis of the cervical carotid/vertebral   arteries. 3. No focal stenosis of the [id number]. CT HEAD WO CONTRAST   Final Result   No acute intracranial abnormality. XR CHEST PORTABLE   Final Result   No significant findings in the chest.                 Assessment/Plan:    Active Hospital Problems    Diagnosis     Dizziness [R42]     Vertebral artery stenosis, left [I65.02]     Hypertensive urgency [I16.0]     HTN (hypertension), benign [I10]      Hypertensive emergency : BP improving  Weaned off of the nitro drip  Clonidine and losartan discontinued  Metoprolol on hold for now; concerns for bradycardia  Currently on nifedipine and hydralazine with hold parameters  Hold parameters added     Vertigo: Resolved  Ruled out CVA  MRI brain negative for acute events     FATUMA: Secondary to hypertensive emergency  Creatinine elevated to 1.5: Trended down to 1.2  Nephrology consulted; appreciate their recommendations  Avoid nephrotoxins  Continue IV fluids   Strict intake and output  Daily weights     Severe vertebral artery stenosis on CTA head neck  Vascular surgery consulted; no surgical intervention needed per their recs    ASCVD score 14%  Moderate to high intensity statin recommended  We will continue Lipitor on discharge      DVT Prophylaxis: Lovenox  Diet: ADULT DIET; Regular;  No Added Salt (3-4 gm)  Code Status: Full Code      Dispo; plan to discharge home tomorrow if medically stable    Electronically signed by Yobany Trujillo MD on 6/24/2021 at 8:09 AM

## 2021-06-25 VITALS
BODY MASS INDEX: 31.47 KG/M2 | OXYGEN SATURATION: 99 % | RESPIRATION RATE: 20 BRPM | HEIGHT: 72 IN | WEIGHT: 232.37 LBS | SYSTOLIC BLOOD PRESSURE: 136 MMHG | TEMPERATURE: 98 F | HEART RATE: 84 BPM | DIASTOLIC BLOOD PRESSURE: 83 MMHG

## 2021-06-25 LAB
ANION GAP SERPL CALCULATED.3IONS-SCNC: 12 MMOL/L (ref 3–16)
BASOPHILS ABSOLUTE: 0 K/UL (ref 0–0.2)
BASOPHILS RELATIVE PERCENT: 0.7 %
BUN BLDV-MCNC: 14 MG/DL (ref 7–20)
CALCIUM SERPL-MCNC: 9 MG/DL (ref 8.3–10.6)
CHLORIDE BLD-SCNC: 104 MMOL/L (ref 99–110)
CO2: 20 MMOL/L (ref 21–32)
CREAT SERPL-MCNC: 1.3 MG/DL (ref 0.9–1.3)
EOSINOPHILS ABSOLUTE: 0.3 K/UL (ref 0–0.6)
EOSINOPHILS RELATIVE PERCENT: 5.5 %
GFR AFRICAN AMERICAN: >60
GFR NON-AFRICAN AMERICAN: 59
GLUCOSE BLD-MCNC: 121 MG/DL (ref 70–99)
HCT VFR BLD CALC: 46.3 % (ref 40.5–52.5)
HEMOGLOBIN: 15.3 G/DL (ref 13.5–17.5)
LYMPHOCYTES ABSOLUTE: 2.2 K/UL (ref 1–5.1)
LYMPHOCYTES RELATIVE PERCENT: 36.7 %
MAGNESIUM: 2 MG/DL (ref 1.8–2.4)
MCH RBC QN AUTO: 28.8 PG (ref 26–34)
MCHC RBC AUTO-ENTMCNC: 33.1 G/DL (ref 31–36)
MCV RBC AUTO: 87 FL (ref 80–100)
MONOCYTES ABSOLUTE: 0.7 K/UL (ref 0–1.3)
MONOCYTES RELATIVE PERCENT: 12.5 %
NEUTROPHILS ABSOLUTE: 2.6 K/UL (ref 1.7–7.7)
NEUTROPHILS RELATIVE PERCENT: 44.6 %
PDW BLD-RTO: 14.9 % (ref 12.4–15.4)
PHOSPHORUS: 3.3 MG/DL (ref 2.5–4.9)
PLATELET # BLD: 340 K/UL (ref 135–450)
PMV BLD AUTO: 7.6 FL (ref 5–10.5)
POTASSIUM SERPL-SCNC: 3.6 MMOL/L (ref 3.5–5.1)
RBC # BLD: 5.32 M/UL (ref 4.2–5.9)
SODIUM BLD-SCNC: 136 MMOL/L (ref 136–145)
WBC # BLD: 5.9 K/UL (ref 4–11)

## 2021-06-25 PROCEDURE — 99233 SBSQ HOSP IP/OBS HIGH 50: CPT | Performed by: INTERNAL MEDICINE

## 2021-06-25 PROCEDURE — 84100 ASSAY OF PHOSPHORUS: CPT

## 2021-06-25 PROCEDURE — 83735 ASSAY OF MAGNESIUM: CPT

## 2021-06-25 PROCEDURE — 85025 COMPLETE CBC W/AUTO DIFF WBC: CPT

## 2021-06-25 PROCEDURE — C9113 INJ PANTOPRAZOLE SODIUM, VIA: HCPCS | Performed by: INTERNAL MEDICINE

## 2021-06-25 PROCEDURE — 2580000003 HC RX 258: Performed by: INTERNAL MEDICINE

## 2021-06-25 PROCEDURE — 80048 BASIC METABOLIC PNL TOTAL CA: CPT

## 2021-06-25 PROCEDURE — 6370000000 HC RX 637 (ALT 250 FOR IP): Performed by: INTERNAL MEDICINE

## 2021-06-25 PROCEDURE — 6360000002 HC RX W HCPCS: Performed by: INTERNAL MEDICINE

## 2021-06-25 RX ORDER — ATORVASTATIN CALCIUM 40 MG/1
40 TABLET, FILM COATED ORAL NIGHTLY
Qty: 30 TABLET | Refills: 1 | Status: SHIPPED | OUTPATIENT
Start: 2021-06-25 | End: 2021-08-09

## 2021-06-25 RX ORDER — NIFEDIPINE 60 MG/1
60 TABLET, FILM COATED, EXTENDED RELEASE ORAL DAILY
Qty: 30 TABLET | Refills: 3 | Status: SHIPPED | OUTPATIENT
Start: 2021-06-25 | End: 2021-07-24

## 2021-06-25 RX ORDER — HYDRALAZINE HYDROCHLORIDE 25 MG/1
25 TABLET, FILM COATED ORAL EVERY 8 HOURS SCHEDULED
Qty: 90 TABLET | Refills: 3 | Status: SHIPPED | OUTPATIENT
Start: 2021-06-25 | End: 2021-07-24

## 2021-06-25 RX ORDER — HYDRALAZINE HYDROCHLORIDE 25 MG/1
25 TABLET, FILM COATED ORAL EVERY 8 HOURS SCHEDULED
Status: DISCONTINUED | OUTPATIENT
Start: 2021-06-25 | End: 2021-06-25 | Stop reason: HOSPADM

## 2021-06-25 RX ADMIN — Medication 10 ML: at 08:38

## 2021-06-25 RX ADMIN — HYDRALAZINE HYDROCHLORIDE 50 MG: 25 TABLET, FILM COATED ORAL at 06:04

## 2021-06-25 RX ADMIN — PANTOPRAZOLE SODIUM 40 MG: 40 INJECTION, POWDER, FOR SOLUTION INTRAVENOUS at 08:33

## 2021-06-25 RX ADMIN — METOPROLOL TARTRATE 25 MG: 25 TABLET, FILM COATED ORAL at 08:23

## 2021-06-25 RX ADMIN — ASPIRIN 81 MG: 81 TABLET, COATED ORAL at 08:23

## 2021-06-25 RX ADMIN — NIFEDIPINE 60 MG: 30 TABLET, FILM COATED, EXTENDED RELEASE ORAL at 09:11

## 2021-06-25 ASSESSMENT — PAIN SCALES - GENERAL
PAINLEVEL_OUTOF10: 0
PAINLEVEL_OUTOF10: 0

## 2021-06-25 NOTE — PLAN OF CARE
Problem: Falls - Risk of:  Goal: Will remain free from falls  Description: Will remain free from falls  6/24/2021 2032 by Arianna Wheat RN  Outcome: Ongoing  6/24/2021 0958 by George Victor RN  Outcome: Ongoing  6/24/2021 0956 by George Victor RN  Outcome: Ongoing  Goal: Absence of physical injury  Description: Absence of physical injury  6/24/2021 2032 by Arianna Wheat RN  Outcome: Ongoing  6/24/2021 0958 by George Victor RN  Outcome: Ongoing  6/24/2021 0956 by George Victor RN  Outcome: Ongoing     Problem: Pain:  Goal: Pain level will decrease  Description: Pain level will decrease  6/24/2021 2032 by Arianna Wheat RN  Outcome: Ongoing  6/24/2021 0958 by George Victor RN  Outcome: Ongoing  6/24/2021 0956 by George Victor RN  Outcome: Ongoing  Goal: Control of acute pain  Description: Control of acute pain  6/24/2021 2032 by Arianna Wheat RN  Outcome: Ongoing  6/24/2021 0958 by George Victor RN  Outcome: Ongoing  6/24/2021 0956 by George Victor RN  Outcome: Ongoing  Goal: Control of chronic pain  Description: Control of chronic pain  6/24/2021 2032 by Arianna Wheat RN  Outcome: Ongoing  6/24/2021 0958 by George Victor RN  Outcome: Ongoing  6/24/2021 0956 by George Victor RN  Outcome: Ongoing     Problem: Musculor/Skeletal Functional Status  Goal: Highest potential functional level  6/24/2021 2032 by Arianna Wheat RN  Outcome: Ongoing  6/24/2021 0958 by George Victor RN  Outcome: Ongoing  6/24/2021 0956 by George Victor RN  Outcome: Ongoing  Goal: Absence of falls  6/24/2021 2032 by Arianna Wheat RN  Outcome: Ongoing  6/24/2021 0958 by George Victor RN  Outcome: Ongoing  6/24/2021 0956 by George Victor RN  Outcome: Ongoing

## 2021-06-25 NOTE — PROGRESS NOTES
CLINICAL PHARMACY NOTE: MEDS TO BEDS    Total # of Prescriptions Filled: 4   The following medications were delivered to the patient:  · Metoprolol 25 mg  · Nifedipine ER 30 mg  · Hydralazine 25 mg  · Atorvastatin 40 mg    Additional Documentation:  Delivered to Patient-signed  Not in stock, Changed Nifedipine ER 60 mg 1 tablet daily to Nifedipine ER 30 2 tablets daily, patient is aware  Epifanio Garsia CPhT

## 2021-06-25 NOTE — PROGRESS NOTES
Office : [phone]     Fax :[phone]       Nephrology progress  Note      Patient's Name: Freddy Blanco  8:03 AM  6/25/2021    Reason for Consult:  HTN urgency       Chief Complaint:    Chief Complaint   Patient presents with    Headache     Patient presents with headache and dizziness that started 2 days ago. Ran out of his BP medications 2 weeks ago. Insurance changed and unable to find PCP yet. History of Present iIlness:    Freddy Blanco is a 50 y.o. male with  H/o HTN who was admitted with c/o headaches and had elevated BP of 213/122 . He was taking losartan and metoprolol but was not able to take since ran out of meds. He mentioned he lost his job because of [employer] and has no PCP now.    BP has slowly improved overnight   His creatinine is elevated at 1.5   At admission was 1.3   Has CTA and exposure to contrast     Interval hx     BP controlled   Creatinine 1.2 --> 1.4 --->1.3       I/O last 3 completed shifts:  In: -   Out: 350 [Urine:350]    Past Medical History:   Diagnosis Date    Hypertension          Allergies:  Codeine, Lisinopril, and Other    Current Medications:    metoprolol tartrate (LOPRESSOR) tablet 25 mg, BID  hydrALAZINE (APRESOLINE) tablet 25 mg, 3 times per day  NIFEdipine (PROCARDIA XL) extended release tablet 60 mg, Daily  promethazine (PHENERGAN) injection 6.25 mg, Q6H PRN  hydrALAZINE (APRESOLINE) injection 10 mg, Q4H PRN  labetalol (NORMODYNE;TRANDATE) injection 10 mg, Q6H PRN  pantoprazole (PROTONIX) injection 40 mg, Daily  HYDROmorphone HCl PF (DILAUDID) injection 0.5 mg, Q4H PRN  sodium chloride flush 0.9 % injection 5-40 mL, 2 times per day  sodium chloride flush 0.9 % injection 5-40 mL, PRN  0.9 % sodium chloride infusion, PRN  ondansetron (ZOFRAN-ODT) disintegrating tablet 4 mg, Q8H PRN   Or  ondansetron (ZOFRAN) injection 4 mg, Q6H PRN  polyethylene glycol (GLYCOLAX) packet 17 g, Daily PRN  enoxaparin (LOVENOX) injection 40 mg, Daily  aspirin EC tablet 81 mg, Daily   Or  aspirin suppository 300 mg, Daily  acetaminophen (TYLENOL) tablet 650 mg, Q4H PRN   Or  acetaminophen (TYLENOL) suppository 650 mg, Q4H PRN  atorvastatin (LIPITOR) tablet 40 mg, Nightly  diphenhydrAMINE (BENADRYL) tablet 25 mg, Q4H PRN        Physical exam:     Vitals:  BP (!) 142/102   Pulse 93   Temp 98.1 °F (36.7 °C) (Temporal)   Resp 16   Ht 6' (1.829 m)   Wt 232 lb 5.8 oz (105.4 kg)   SpO2 99%   BMI 31.51 kg/m²   Constitutional:  OAA X3 NAD  Skin: no rash, turgor wnl  Heent:  eomi, mmm  Neck: no bruits or jvd noted  Cardiovascular:  S1, S2 without m/r/g  Respiratory: CTA B without w/r/r  Abdomen:  +bs, soft, nt, nd  Ext: no  lower extremity edema  Psychiatric: mood and affect appropriate  Musculoskeletal:  Rom, muscular strength intact    Labs:  CBC:   Recent Labs     06/23/21 0310 06/24/21  0645 06/25/21  0420   WBC 8.4 6.6 5.9   HGB 15.2 15.2 15.3    298 340     BMP:    Recent Labs     06/23/21 0310 06/24/21  0645 06/25/21  0420   * 136 136   K 3.5 3.4* 3.6    102 104   CO2 21 20* 20*   BUN 16 13 14   CREATININE 1.2 1.4* 1.3   GLUCOSE 120* 125* 121*     Ca/Mg/Phos:   Recent Labs     06/23/21 0310 06/24/21  0645 06/25/21  0420   CALCIUM 8.9 9.0 9.0   MG 1.90 2.00 2.00   PHOS 2.3* 3.1 3.3     Hepatic:   No results for input(s): AST, ALT, ALB, BILITOT, ALKPHOS in the last 72 hours. Troponin:   No results for input(s): TROPONINI in the last 72 hours. BNP: No results for input(s): BNP in the last 72 hours. Lipids:   No results for input(s): CHOL, TRIG, HDL, LDLCALC, LABVLDL in the last 72 hours. ABGs: No results for input(s): PHART, PO2ART, OUD6NZF in the last 72 hours. INR:   No results for input(s): INR in the last 72 hours.   UA:  No results for input(s): Galindo Peraleso, GLUCOSEU, BILIRUBINUR, Loretha Merl, BLOODU, PHUR, PROTEINU, UROBILINOGEN, NITRU, LEUKOCYTESUR, Roxianne Mookie in the last 72 hours. Urine Microscopic: No results for input(s): LABCAST, BACTERIA, COMU, HYALCAST, WBCUA, RBCUA, EPIU in the last 72 hours. Urine Culture: No results for input(s): LABURIN in the last 72 hours. Urine Chemistry: No results for input(s): Randol Balder, PROTEINUR, NAUR in the last 72 hours. IMAGING:  MRI brain without contrast   Final Result   No acute intracranial abnormality. Mild chronic microvascular disease. Sinus mucosal disease. CTA HEAD NECK W CONTRAST   Final Result   1. Severe short-segment stenosis at the origin of the left vertebral artery. 2. Otherwise, no flow limiting stenosis of the cervical carotid/vertebral   arteries. 3. No focal stenosis of the [id number]. CT HEAD WO CONTRAST   Final Result   No acute intracranial abnormality. XR CHEST PORTABLE   Final Result   No significant findings in the chest.                 Assessment/Plan :      1. FATUMA likely 2/2 HTN emergency   Creat increased likely from normotensive ischemic injury     Monitor renal function   Recommend to dose adjust all medications  based on renal functions  Maintain SBP> 90 mmHg   Daily weights   AVOID NSAIDs  Avoid Nephrotoxins  Monitor Intake/Output  Call if significant decrease in urine output     2. HTN. Controlled   Start metoprolol  Decrease dose of hydralazine       Will recommend to slowly bring the BP down   Avoid dropping SBP < 140 mmHG today     Hypertension education given     ? Lose weight (if you are overweight)  ? Choose a diet low in fat and rich in fruits, vegetables, and low-fat dairy products  ? Reduce the amount of salt you eat, avoid sodas. ?Do something active for at least 30 minutes a day on most days of the week  ? Cut down on alcohol (if you drink more than 2 alcoholic drinks per day), if you smoke then try to quit.         F/u in office in 2 weeks         D/w primary team      Thank you for allowing us to participate in care of Luz Marina Jackelyn         Electronically signed by: Aracely Kwon MD, 6/25/2021, 8:03 AM      [facility] [address]  Office : [phone]  Fax :[phone]

## 2021-07-04 NOTE — DISCHARGE SUMMARY
Hospital Medicine Discharge Summary    Patient ID: Pili Cross      Patient's PCP: Brandon Abreu MD    Admit Date: 6/20/2021     Discharge Date: 6/25/2021     Admitting Physician: Chelsea Carreon MD     Discharge Physician: Brenden Fang MD        Active Hospital Problems    Diagnosis     Dizziness [R42]     Vertebral artery stenosis, left [I65.02]     Hypertensive urgency [I16.0]     HTN (hypertension), benign [I10]        The patient was seen and examined on day of discharge and this discharge summary is in conjunction with any daily progress note from day of discharge. Hospital Course: This 51 y/o male PMHx  of hypertension presented for evaluation of vertigo for the past 2 to 3 days.  Patient describes room spinning sensation and balance issues on and off. On admission, patient found to have significantly elevated blood pressures (212/122).  CT& CTA head and neck was negative for any acute ischemic changes or bleed, however, notable for severe stenosis of vertebral artery. Hypertensive emergency: Resolved  Patient was transferred to ICU and started on nitro drip. Nitro drip was weaned slowly and patient was switched to oral medications. Patient was discharged home in stable condition and was instructed to follow-up with his PCP on nephrology.      Vertigo: Resolved  MRI brain negative for acute events     FATUMA: Secondary to hypertensive emergency  Creatinine elevated to 1.5: Trended down to 1.2  Patient was followed by nephrology during hospital stay     Severe vertebral artery stenosis on CTA head neck  Vascular surgery consulted; no surgical intervention needed per their recs       Physical Exam Performed:     /83   Pulse 84   Temp 98 °F (36.7 °C) (Temporal)   Resp 20   Ht 6' (1.829 m)   Wt 232 lb 5.8 oz (105.4 kg)   SpO2 99%   BMI 31.51 kg/m²       General appearance:  No apparent distress, appears stated age and cooperative.   Respiratory:  Normal respiratory effort. Clear to auscultation, bilaterally without Rales/Wheezes/Rhonchi. Cardiovascular:  Regular rate and rhythm with normal S1/S2 without murmurs, rubs or gallops. Abdomen: Soft, non-tender, non-distended with normal bowel sounds. Musculoskeletal:  No clubbing, cyanosis or edema bilaterally. Full range of motion without deformity. Neurologic:  Neurovascularly intact without any focal sensory/motor deficits. Cranial nerves: II-XII intact, grossly non-focal.  Skin: Skin color, texture, turgor normal.  No rashes or lesions. Psychiatric:  Alert and oriented, thought content appropriate, normal insight  Capillary Refill: Brisk,< 3 seconds   Peripheral Pulses: +2 palpable, equal bilaterally     Labs: For convenience and continuity at follow-up the following most recent labs are provided:      CBC:    Lab Results   Component Value Date    WBC 5.9 06/25/2021    HGB 15.3 06/25/2021    HCT 46.3 06/25/2021     06/25/2021       Renal:    Lab Results   Component Value Date     06/25/2021    K 3.6 06/25/2021    K 3.8 06/20/2021     06/25/2021    CO2 20 06/25/2021    BUN 14 06/25/2021    CREATININE 1.3 06/25/2021    CALCIUM 9.0 06/25/2021    PHOS 3.3 06/25/2021         Significant Diagnostic Studies    Radiology:   MRI brain without contrast   Final Result   No acute intracranial abnormality. Mild chronic microvascular disease. Sinus mucosal disease. CTA HEAD NECK W CONTRAST   Final Result   1. Severe short-segment stenosis at the origin of the left vertebral artery. 2. Otherwise, no flow limiting stenosis of the cervical carotid/vertebral   arteries. 3. No focal stenosis of the [id number]-fl-Vmazyl. CT HEAD WO CONTRAST   Final Result   No acute intracranial abnormality.          XR CHEST PORTABLE   Final Result   No significant findings in the chest.                Consults:     IP CONSULT TO VASCULAR SURGERY  IP CONSULT TO PHARMACY  IP CONSULT TO NEUROLOGY  IP CONSULT TO NEPHROLOGY    Disposition:  Home     Condition at Discharge: Stable     Discharge Instructions/Follow-up:   Follow up with your PCP within 7 days of discharge. Monitor your blood pressure at home closely  Follow up with  nephrology in 2 weeks  Take all your medications as prescribed. Discharge Medications:     Discharge Medication List as of 6/25/2021  9:53 AM           Details   hydrALAZINE (APRESOLINE) 25 MG tablet Take 1 tablet by mouth every 8 hours, Disp-90 tablet, R-3Normal      NIFEdipine (ADALAT CC) 60 MG extended release tablet Take 1 tablet by mouth daily, Disp-30 tablet, R-3Normal      atorvastatin (LIPITOR) 40 MG tablet Take 1 tablet by mouth nightly, Disp-30 tablet, R-1Normal              Details   metoprolol tartrate (LOPRESSOR) 25 MG tablet Take 1 tablet by mouth 2 times daily, Disp-60 tablet, R-3Normal             Time Spent on discharge is more than 30 mins in the examination, evaluation, counseling and review of medications and discharge plan. Signed:    Lary Perdue MD   7/3/2021      Thank you Willie Boas, MD for the opportunity to be involved in this patient's care. If you have any questions or concerns please feel free to contact me at [phone].

## 2021-08-09 ENCOUNTER — HOSPITAL ENCOUNTER (OUTPATIENT)
Age: 46
Discharge: HOME | End: 2021-08-09
Payer: COMMERCIAL

## 2021-08-09 ENCOUNTER — OFFICE VISIT (OUTPATIENT)
Dept: INTERNAL MEDICINE CLINIC | Age: 46
End: 2021-08-09
Payer: COMMERCIAL

## 2021-08-09 ENCOUNTER — NURSE TRIAGE (OUTPATIENT)
Dept: OTHER | Facility: CLINIC | Age: 46
End: 2021-08-09

## 2021-08-09 ENCOUNTER — TELEPHONE (OUTPATIENT)
Dept: INTERNAL MEDICINE CLINIC | Age: 46
End: 2021-08-09

## 2021-08-09 VITALS
BODY MASS INDEX: 31.4 KG/M2 | DIASTOLIC BLOOD PRESSURE: 90 MMHG | WEIGHT: 231.8 LBS | HEIGHT: 72 IN | OXYGEN SATURATION: 98 % | SYSTOLIC BLOOD PRESSURE: 120 MMHG | HEART RATE: 72 BPM

## 2021-08-09 DIAGNOSIS — E66.9 OBESITY (BMI 30.0-34.9): ICD-10-CM

## 2021-08-09 DIAGNOSIS — I65.02 VERTEBRAL ARTERY STENOSIS, LEFT: ICD-10-CM

## 2021-08-09 DIAGNOSIS — I10 HTN (HYPERTENSION), BENIGN: ICD-10-CM

## 2021-08-09 DIAGNOSIS — N28.9 RENAL INSUFFICIENCY: ICD-10-CM

## 2021-08-09 DIAGNOSIS — Z12.11 COLON CANCER SCREENING: ICD-10-CM

## 2021-08-09 DIAGNOSIS — R35.1 BPH ASSOCIATED WITH NOCTURIA: ICD-10-CM

## 2021-08-09 DIAGNOSIS — R21 SKIN RASH: ICD-10-CM

## 2021-08-09 DIAGNOSIS — I10 HTN (HYPERTENSION), BENIGN: Primary | ICD-10-CM

## 2021-08-09 DIAGNOSIS — R53.83 FATIGUE, UNSPECIFIED TYPE: ICD-10-CM

## 2021-08-09 DIAGNOSIS — E78.2 MIXED HYPERLIPIDEMIA: ICD-10-CM

## 2021-08-09 DIAGNOSIS — N40.1 BPH ASSOCIATED WITH NOCTURIA: ICD-10-CM

## 2021-08-09 PROBLEM — R06.02 SOB (SHORTNESS OF BREATH): Status: RESOLVED | Noted: 2019-09-27 | Resolved: 2021-08-09

## 2021-08-09 PROBLEM — R07.9 CHEST PAIN: Status: RESOLVED | Noted: 2019-09-26 | Resolved: 2021-08-09

## 2021-08-09 PROBLEM — E66.811 OBESITY (BMI 30.0-34.9): Status: ACTIVE | Noted: 2021-08-09

## 2021-08-09 LAB
ANION GAP SERPL CALCULATED.3IONS-SCNC: 15 MMOL/L (ref 3–16)
BASOPHILS ABSOLUTE: 0 K/UL (ref 0–0.2)
BASOPHILS RELATIVE PERCENT: 0.7 %
BUN BLDV-MCNC: 22 MG/DL (ref 7–20)
CALCIUM SERPL-MCNC: 9.3 MG/DL (ref 8.3–10.6)
CHLORIDE BLD-SCNC: 104 MMOL/L (ref 99–110)
CHOLESTEROL, TOTAL: 204 MG/DL (ref 0–199)
CO2: 20 MMOL/L (ref 21–32)
CREAT SERPL-MCNC: 1.8 MG/DL (ref 0.9–1.3)
EOSINOPHILS ABSOLUTE: 0.1 K/UL (ref 0–0.6)
EOSINOPHILS RELATIVE PERCENT: 2.4 %
GFR AFRICAN AMERICAN: 49
GFR NON-AFRICAN AMERICAN: 40
GLUCOSE BLD-MCNC: 83 MG/DL (ref 70–99)
HCT VFR BLD CALC: 44.1 % (ref 40.5–52.5)
HDLC SERPL-MCNC: 44 MG/DL (ref 40–60)
HEMOGLOBIN: 14.8 G/DL (ref 13.5–17.5)
LDL CHOLESTEROL CALCULATED: 145 MG/DL
LYMPHOCYTES ABSOLUTE: 2 K/UL (ref 1–5.1)
LYMPHOCYTES RELATIVE PERCENT: 43.4 %
MCH RBC QN AUTO: 28.9 PG (ref 26–34)
MCHC RBC AUTO-ENTMCNC: 33.5 G/DL (ref 31–36)
MCV RBC AUTO: 86.3 FL (ref 80–100)
MONOCYTES ABSOLUTE: 0.5 K/UL (ref 0–1.3)
MONOCYTES RELATIVE PERCENT: 11.4 %
NEUTROPHILS ABSOLUTE: 2 K/UL (ref 1.7–7.7)
NEUTROPHILS RELATIVE PERCENT: 42.1 %
PDW BLD-RTO: 14.8 % (ref 12.4–15.4)
PLATELET # BLD: 392 K/UL (ref 135–450)
PMV BLD AUTO: 8.8 FL (ref 5–10.5)
POTASSIUM SERPL-SCNC: 4.3 MMOL/L (ref 3.5–5.1)
RBC # BLD: 5.11 M/UL (ref 4.2–5.9)
RHEUMATOID FACTOR: <10 IU/ML
SODIUM BLD-SCNC: 139 MMOL/L (ref 136–145)
TRIGL SERPL-MCNC: 77 MG/DL (ref 0–150)
VLDLC SERPL CALC-MCNC: 15 MG/DL
WBC # BLD: 4.7 K/UL (ref 4–11)

## 2021-08-09 PROCEDURE — 84270 ASSAY OF SEX HORMONE GLOBUL: CPT

## 2021-08-09 PROCEDURE — 86039 ANTINUCLEAR ANTIBODIES (ANA): CPT

## 2021-08-09 PROCEDURE — 86225 DNA ANTIBODY NATIVE: CPT

## 2021-08-09 PROCEDURE — 86235 NUCLEAR ANTIGEN ANTIBODY: CPT

## 2021-08-09 PROCEDURE — 86431 RHEUMATOID FACTOR QUANT: CPT

## 2021-08-09 PROCEDURE — 80048 BASIC METABOLIC PNL TOTAL CA: CPT

## 2021-08-09 PROCEDURE — 85025 COMPLETE CBC W/AUTO DIFF WBC: CPT

## 2021-08-09 PROCEDURE — 80061 LIPID PANEL: CPT

## 2021-08-09 PROCEDURE — 86376 MICROSOMAL ANTIBODY EACH: CPT

## 2021-08-09 PROCEDURE — 99204 OFFICE O/P NEW MOD 45 MIN: CPT | Performed by: INTERNAL MEDICINE

## 2021-08-09 PROCEDURE — 83036 HEMOGLOBIN GLYCOSYLATED A1C: CPT

## 2021-08-09 PROCEDURE — 36415 COLL VENOUS BLD VENIPUNCTURE: CPT

## 2021-08-09 PROCEDURE — 86038 ANTINUCLEAR ANTIBODIES: CPT

## 2021-08-09 PROCEDURE — 84403 ASSAY OF TOTAL TESTOSTERONE: CPT

## 2021-08-09 PROCEDURE — 86160 COMPLEMENT ANTIGEN: CPT

## 2021-08-09 RX ORDER — METHYLPREDNISOLONE 4 MG/1
TABLET ORAL
Qty: 1 KIT | Refills: 0 | Status: SHIPPED | OUTPATIENT
Start: 2021-08-09 | End: 2021-08-09 | Stop reason: SDUPTHER

## 2021-08-09 RX ORDER — BLOOD PRESSURE TEST KIT
1 KIT MISCELLANEOUS DAILY
Qty: 1 KIT | Refills: 0 | Status: SHIPPED | OUTPATIENT
Start: 2021-08-09 | End: 2021-08-09 | Stop reason: SDUPTHER

## 2021-08-09 RX ORDER — TERAZOSIN 1 MG/1
1 CAPSULE ORAL NIGHTLY
Qty: 30 CAPSULE | Refills: 3 | Status: SHIPPED | OUTPATIENT
Start: 2021-08-09 | End: 2021-08-09 | Stop reason: SDUPTHER

## 2021-08-09 RX ORDER — BLOOD PRESSURE TEST KIT
1 KIT MISCELLANEOUS DAILY
Qty: 1 KIT | Refills: 0 | Status: SHIPPED | OUTPATIENT
Start: 2021-08-09

## 2021-08-09 RX ORDER — TERAZOSIN 1 MG/1
1 CAPSULE ORAL NIGHTLY
Qty: 30 CAPSULE | Refills: 3 | Status: SHIPPED | OUTPATIENT
Start: 2021-08-09 | End: 2021-08-16 | Stop reason: SDUPTHER

## 2021-08-09 RX ORDER — METHYLPREDNISOLONE 4 MG/1
TABLET ORAL
Qty: 1 KIT | Refills: 0 | Status: SHIPPED | OUTPATIENT
Start: 2021-08-09 | End: 2021-08-15

## 2021-08-09 SDOH — ECONOMIC STABILITY: FOOD INSECURITY: WITHIN THE PAST 12 MONTHS, YOU WORRIED THAT YOUR FOOD WOULD RUN OUT BEFORE YOU GOT MONEY TO BUY MORE.: SOMETIMES TRUE

## 2021-08-09 SDOH — ECONOMIC STABILITY: FOOD INSECURITY: WITHIN THE PAST 12 MONTHS, THE FOOD YOU BOUGHT JUST DIDN'T LAST AND YOU DIDN'T HAVE MONEY TO GET MORE.: SOMETIMES TRUE

## 2021-08-09 ASSESSMENT — ENCOUNTER SYMPTOMS
BLURRED VISION: 0
SHORTNESS OF BREATH: 0

## 2021-08-09 ASSESSMENT — SOCIAL DETERMINANTS OF HEALTH (SDOH): HOW HARD IS IT FOR YOU TO PAY FOR THE VERY BASICS LIKE FOOD, HOUSING, MEDICAL CARE, AND HEATING?: SOMEWHAT HARD

## 2021-08-09 NOTE — ASSESSMENT & PLAN NOTE
Discussed patient his weight and lifestyle modification including calorie counting versus different types of diets and again try and work with him to lose weight over the next few months

## 2021-08-09 NOTE — ASSESSMENT & PLAN NOTE
Reviewed patient lab results going all the way back to 2016 I think he has stage III kidney dysfunction at this stage I did that he emphasized to patient importance of good blood pressure control and we will recheck his blood test to monitor clinically    CMP:  Lab Results   Component Value Date     06/25/2021    K 3.6 06/25/2021    K 3.8 06/20/2021     06/25/2021    CO2 20 06/25/2021    ANIONGAP 12 06/25/2021    GLUCOSE 121 06/25/2021    BUN 14 06/25/2021    CREATININE 1.3 06/25/2021    GFRAA >60 06/25/2021    CALCIUM 9.0 06/25/2021    PROT 7.4 06/20/2021    LABALBU 4.0 06/20/2021    AGRATIO 1.2 06/20/2021    BILITOT 0.5 06/20/2021    ALKPHOS 95 06/20/2021    ALT 40 06/20/2021    AST 22 06/20/2021    GLOB 3.4 06/20/2021       Urine:   Lab Results   Component Value Date    COLORU YELLOW 06/20/2021    CLARITYU Clear 06/20/2021    BILIRUBINUR Negative 06/20/2021    KETUA Negative 06/20/2021    SPECGRAV >1.030 06/20/2021    BLOODU Negative 06/20/2021    PROTEINU Negative 06/20/2021    UROBILINOGEN 0.2 06/20/2021    NITRU Negative 06/20/2021    LEUKOCYTESUR Negative 06/20/2021    RBCUA 1 01/03/2018    WBCUA 1 01/03/2018       PTH: No results found for: PTH

## 2021-08-09 NOTE — TELEPHONE ENCOUNTER
Patient called [facility] with red flag complaint to establish care with [facility]. Brief description of triage: rash on ears and stomach for over a month, since starting nifedipine in hospital. States itching is severe. Triage indicates for patient to be seen today. Advised patient to establish care and also to go to [facility] today. Care advice provided, patient verbalizes understanding; denies any other questions or concerns; instructed to call back for any new or worsening symptoms. Writer provided warm transfer to [facility] at [facility] for appointment scheduling. Attention Provider: Thank you for allowing me to participate in the care of your patient. The patient was connected to triage in response to information provided to the [facility]. Please do not respond through this encounter as the response is not directed to a shared pool. Reason for Disposition   Symptom of illness (e.g., headache, abdominal pain, earache, vomiting) that are more than mild   SEVERE itching    Answer Assessment - Initial Assessment Questions  1. NAME of MEDICATION: \"What medicine are you calling about? \"      Nifedipine    2. QUESTION: Allangretel Bazziminnie is your question? \"      Reaction    3. PRESCRIBING HCP: \"Who prescribed it? \" Reason: if prescribed by specialist, call should be referred to that group. [facility] and Dr. Tobias Gould    4. SYMPTOMS: \"Do you have any symptoms? \"      Rash 2 days after discharge    5. SEVERITY: If symptoms are present, ask \"Are they mild, moderate or severe? \"      Itching keeps him up at night    6. PREGNANCY:  \"Is there any chance that you are pregnant? \" \"When was your last menstrual period? \"      N/A    Answer Assessment - Initial Assessment Questions  1. APPEARANCE of RASH: \"Describe the rash. \" (e.g., spots, blisters, raised areas, skin peeling, scaly)       Fine bumps    2. SIZE: \"How big are the spots? \" (e.g., tip of pen, eraser, coin; inches, centimeters)      Fine    3.  LOCATION: \"Where is the rash located? \"      Ear and abdomen    4. COLOR: \"What color is the rash? \" (Note: It is difficult to assess rash color in people with darker-colored skin. When this situation occurs, simply ask the caller to describe what they see.)      Unsure, skin is black    5. ONSET: \"When did the rash begin? \"      Over a month ago    6. FEVER: \"Do you have a fever? \" If so, ask: \"What is your temperature, how was it measured, and when did it start? \"      No    7. ITCHING: \"Does the rash itch? \" If so, ask: \"How bad is the itch? \" (Scale 1-10; or mild, moderate, severe)      Keeps him up at night    8. CAUSE: \"What do you think is causing the rash? \"      Medication    9. MEDICATION FACTORS: \"Have you started any new medications within the last 2 weeks? \" (e.g., antibiotics)       Metoprolol, hydralazine also new    10. OTHER SYMPTOMS: \"Do you have any other symptoms? \" (e.g., dizziness, headache, sore throat, joint pain)        Painful rash, trying hydrocortisone and benadryl    11. PREGNANCY: \"Is there any chance you are pregnant? \" \"When was your last menstrual period? \"        N/A    Protocols used: MEDICATION QUESTION CALL-ADULT-[place], RASH OR REDNESS - [facility]

## 2021-08-09 NOTE — TELEPHONE ENCOUNTER
Pt is requesting that the medications methylPREDNISolone (MEDROL DOSEPACK) 4 MG tablet,     and   terazosin (HYTRIN) 1 MG capsule,     and Blood Pressure KIT  To be sent to [facility] [address], [address] [address]        Please call and advise if any issues

## 2021-08-09 NOTE — ASSESSMENT & PLAN NOTE
Reviewed with patient his most recent hospitalization and medication management I discussed with patient the definition of hypertension different levels of it as well as the most common short-term and long-term complications the patient definitely have a chronic uncontrolled hypertension that is already resulting in some kidney dysfunction at this point we will can be aggressive with the management trying to get him to the 130/80 or less but it all depends on the patient compliance and he is very aware of this as well as his wife    At this point and given the patientagain I discontinue his hydralazine and switch him over to an alpha-blocker we discussed the side effects and how to monitor for the NP    Patient is also approached regarding GERD lifestyle modifications including weight loss and exercise in an effort to improve his overall outcome and we will can start working with him on that

## 2021-08-09 NOTE — ASSESSMENT & PLAN NOTE
I am very concerned that the skin rash deep maculopapular especially with the timeline associated with medication use it could be as straight for the other option but that cannot rule out an antibody mediated reaction so at this point we can discontinue hydralazine and give the patient a short course of steroids and continue Benadryl and check blood tests and reassess in a week or so

## 2021-08-09 NOTE — PROGRESS NOTES
Emelyn Pacheco (:  1975) is a 50 y.o. male,New patient, here for evaluation of the following chief complaint(s):  Established New Doctor (rash on stomach, lt knee and rt arm as well as ears itching from b/p meds)         ASSESSMENT/PLAN:  1. HTN (hypertension), benign  Assessment & Plan:   Reviewed with patient his most recent hospitalization and medication management I discussed with patient the definition of hypertension different levels of it as well as the most common short-term and long-term complications the patient definitely have a chronic uncontrolled hypertension that is already resulting in some kidney dysfunction at this point we will can be aggressive with the management trying to get him to the 130/80 or less but it all depends on the patient compliance and he is very aware of this as well as his wife    At this point and given the patientagain I discontinue his hydralazine and switch him over to an alpha-blocker we discussed the side effects and how to monitor for the NP    Patient is also approached regarding GERD lifestyle modifications including weight loss and exercise in an effort to improve his overall outcome and we will can start working with him on that  Orders:  -     Blood Pressure KIT; DAILY Starting Mon 2021, Disp-1 kit, R-0, Normal  -     CBC Auto Differential; Future  -     Hemoglobin A1C; Future  -     Lipid Panel; Future  -     terazosin (HYTRIN) 1 MG capsule; Take 1 capsule by mouth nightly, Disp-30 capsule, R-3Normal  2.  Renal insufficiency  Assessment & Plan:   Reviewed patient lab results going all the way back to 2016 I think he has stage III kidney dysfunction at this stage I did that he emphasized to patient importance of good blood pressure control and we will recheck his blood test to monitor clinically    CMP:  Lab Results   Component Value Date     2021    K 3.6 2021    K 3.8 2021     2021    CO2 20 2021    ANIONGAP 12 06/25/2021    GLUCOSE 121 06/25/2021    BUN 14 06/25/2021    CREATININE 1.3 06/25/2021    GFRAA >60 06/25/2021    CALCIUM 9.0 06/25/2021    PROT 7.4 06/20/2021    LABALBU 4.0 06/20/2021    AGRATIO 1.2 06/20/2021    BILITOT 0.5 06/20/2021    ALKPHOS 95 06/20/2021    ALT 40 06/20/2021    AST 22 06/20/2021    GLOB 3.4 06/20/2021       Urine:   Lab Results   Component Value Date    COLORU YELLOW 06/20/2021    CLARITYU Clear 06/20/2021    BILIRUBINUR Negative 06/20/2021    KETUA Negative 06/20/2021    SPECGRAV >1.030 06/20/2021    BLOODU Negative 06/20/2021    PROTEINU Negative 06/20/2021    UROBILINOGEN 0.2 06/20/2021    NITRU Negative 06/20/2021    LEUKOCYTESUR Negative 06/20/2021    RBCUA 1 01/03/2018    WBCUA 1 01/03/2018       PTH: No results found for: PTH      Orders:  -     Basic Metabolic Panel; Future  3. Vertebral artery stenosis, left  4. Mixed hyperlipidemia  5. Obesity (BMI 30.0-34. 9)  Assessment & Plan:   Discussed patient his weight and lifestyle modification including calorie counting versus different types of diets and again try and work with him to lose weight over the next few months  6. BPH associated with nocturia  7. Skin rash  Assessment & Plan:   I am very concerned that the skin rash deep maculopapular especially with the timeline associated with medication use it could be as straight for the other option but that cannot rule out an antibody mediated reaction so at this point we can discontinue hydralazine and give the patient a short course of steroids and continue Benadryl and check blood tests and reassess in a week or so  Orders:  -     BRODY; Future  -     Rheumatoid Factor; Future  -     Lupus (LE) panel w/ reflex; Future  -     methylPREDNISolone (MEDROL DOSEPACK) 4 MG tablet; Take by mouth., Disp-1 kit, R-0Normal  8. Colon cancer screening  -     AFL - Stephanie Reyes MD, Gastroenterology, [facility]  9.  Fatigue, unspecified type  -     Testosterone, free, total; Future      Return in about 4 weeks (around 9/6/2021). Subjective   SUBJECTIVE/OBJECTIVE:    Lab Review   Lab Results   Component Value Date     06/25/2021     06/24/2021     06/23/2021    K 3.6 06/25/2021    K 3.4 06/24/2021    K 3.5 06/23/2021    K 3.8 06/20/2021    CO2 20 06/25/2021    CO2 20 06/24/2021    CO2 21 06/23/2021    BUN 14 06/25/2021    BUN 13 06/24/2021    BUN 16 06/23/2021    CREATININE 1.3 06/25/2021    CREATININE 1.4 06/24/2021    CREATININE 1.2 06/23/2021    GLUCOSE 121 06/25/2021    GLUCOSE 125 06/24/2021    GLUCOSE 120 06/23/2021    CALCIUM 9.0 06/25/2021    CALCIUM 9.0 06/24/2021    CALCIUM 8.9 06/23/2021     Lab Results   Component Value Date    WBC 5.9 06/25/2021    WBC 6.6 06/24/2021    WBC 8.4 06/23/2021    HGB 15.3 06/25/2021    HGB 15.2 06/24/2021    HGB 15.2 06/23/2021    HCT 46.3 06/25/2021    HCT 45.0 06/24/2021    HCT 45.6 06/23/2021    MCV 87.0 06/25/2021    MCV 86.1 06/24/2021    MCV 85.7 06/23/2021     06/25/2021     06/24/2021     06/23/2021     Lab Results   Component Value Date    CHOL 199 06/21/2021    TRIG 67 06/21/2021    HDL 47 06/21/2021       Vitals 8/9/2021 7/12/2021 0/24/2034   SYSTOLIC 710 010 879   DIASTOLIC 90 75 83   Site Left Upper Arm - -   Position Sitting - -   Cuff Size Medium Adult - -   Pulse 72 78 84   Temp - 97.1 -   Resp - - -   SpO2 98 - -   Weight 231 lb 12.8 oz 234 lb -   Height 6' 0\" 6' 0\" -   Body mass index 31.43 kg/m2 31.73 kg/m2 -   Pain Level - - -   Some recent data might be hidden       Hypertension  This is a new problem. The current episode started 1 to 4 weeks ago. Pertinent negatives include no anxiety, blurred vision, chest pain, headaches, peripheral edema, PND, shortness of breath or sweats. Review of Systems   Eyes: Negative for blurred vision. Respiratory: Negative for shortness of breath. Cardiovascular: Negative for chest pain and PND. Neurological: Negative for headaches.           Objective Physical Exam       This dictation was generated by voice recognition computer software. Although all attempts are made to edit the dictation for accuracy, there may be errors in the transcription that are not intended. An electronic signature was used to authenticate this note.     --Holli Hoyt MD

## 2021-08-09 NOTE — PATIENT INSTRUCTIONS
Patient Education        DASH Diet: Care Instructions  Your Care Instructions     The DASH diet is an eating plan that can help lower your blood pressure. DASH stands for Dietary Approaches to Stop Hypertension. Hypertension is high blood pressure. The DASH diet focuses on eating foods that are high in calcium, potassium, and magnesium. These nutrients can lower blood pressure. The foods that are highest in these nutrients are fruits, vegetables, low-fat dairy products, nuts, seeds, and legumes. But taking calcium, potassium, and magnesium supplements instead of eating foods that are high in those nutrients does not have the same effect. The DASH diet also includes whole grains, fish, and poultry. The DASH diet is one of several lifestyle changes your doctor may recommend to lower your high blood pressure. Your doctor may also want you to decrease the amount of sodium in your diet. Lowering sodium while following the DASH diet can lower blood pressure even further than just the DASH diet alone. Follow-up care is a key part of your treatment and safety. Be sure to make and go to all appointments, and call your doctor if you are having problems. It's also a good idea to know your test results and keep a list of the medicines you take. How can you care for yourself at home? Following the DASH diet  · Eat 4 to 5 servings of fruit each day. A serving is 1 medium-sized piece of fruit, ½ cup chopped or canned fruit, 1/4 cup dried fruit, or 4 ounces (½ cup) of fruit juice. Choose fruit more often than fruit juice. · Eat 4 to 5 servings of vegetables each day. A serving is 1 cup of lettuce or raw leafy vegetables, ½ cup of chopped or cooked vegetables, or 4 ounces (½ cup) of vegetable juice. Choose vegetables more often than vegetable juice. · Get 2 to 3 servings of low-fat and fat-free dairy each day. A serving is 8 ounces of milk, 1 cup of yogurt, or 1 ½ ounces of cheese. · Eat 6 to 8 servings of grains each day. A serving is 1 slice of bread, 1 ounce of dry cereal, or ½ cup of cooked rice, pasta, or cooked cereal. Try to choose whole-grain products as much as possible. · Limit lean meat, poultry, and fish to 2 servings each day. A serving is 3 ounces, about the size of a deck of cards. · Eat 4 to 5 servings of nuts, seeds, and legumes (cooked dried beans, lentils, and split peas) each week. A serving is 1/3 cup of nuts, 2 tablespoons of seeds, or ½ cup of cooked beans or peas. · Limit fats and oils to 2 to 3 servings each day. A serving is 1 teaspoon of vegetable oil or 2 tablespoons of salad dressing. · Limit sweets and added sugars to 5 servings or less a week. A serving is 1 tablespoon jelly or jam, ½ cup sorbet, or 1 cup of lemonade. · Eat less than 2,300 milligrams (mg) of sodium a day. If you limit your sodium to 1,500 mg a day, you can lower your blood pressure even more. · Be aware that all of these are the suggested number of servings for people who eat 1,800 to 2,000 calories a day. Your recommended number of servings may be different if you need more or fewer calories. Tips for success  · Start small. Do not try to make dramatic changes to your diet all at once. You might feel that you are missing out on your favorite foods and then be more likely to not follow the plan. Make small changes, and stick with them. Once those changes become habit, add a few more changes. · Try some of the following:  ? Make it a goal to eat a fruit or vegetable at every meal and at snacks. This will make it easy to get the recommended amount of fruits and vegetables each day. ? Try yogurt topped with fruit and nuts for a snack or healthy dessert. ? Add lettuce, tomato, cucumber, and onion to sandwiches. ? Combine a ready-made pizza crust with low-fat mozzarella cheese and lots of vegetable toppings. Try using tomatoes, squash, spinach, broccoli, carrots, cauliflower, and onions. ?  Have a variety of cut-up vegetables with a low-fat dip as an appetizer instead of chips and dip. ? Sprinkle sunflower seeds or chopped almonds over salads. Or try adding chopped walnuts or almonds to cooked vegetables. ? Try some vegetarian meals using beans and peas. Add garbanzo or kidney beans to salads. Make burritos and tacos with mashed hamilton beans or black beans. Where can you learn more? Go to https://iBoxPay.WaveCheck. org and sign in to your ContestMachine account. Enter V158 in the [employer] box to learn more about \"DASH Diet: Care Instructions. \"     If you do not have an account, please click on the \"Sign Up Now\" link. Current as of: August 31, 2020               Content Version: 12.9  © 2006-2021 [employer]. Care instructions adapted under license by [facility] [place] ([facility]). If you have questions about a medical condition or this instruction, always ask your healthcare professional. [place] [address] any warranty or liability for your use of this information. Patient Education        Home Blood Pressure Test: About This Test  What is it? A home blood pressure test allows you to keep track of your blood pressure at home. Blood pressure is a measure of the force of blood against the walls of your arteries. Blood pressure readings include two numbers, such as 130/80 (say \"130 over 80\"). The first number is the systolic pressure. The second number is the diastolic pressure. Why is this test done? You may do this test at home to:  · Find out if you have high blood pressure. · Track your blood pressure if you have high blood pressure. · Track how well medicine is working to reduce high blood pressure. · Check how lifestyle changes, such as weight loss and exercise, are affecting blood pressure. How do you prepare for the test?  For at least 30 minutes before you take your blood pressure, don't exercise, drink caffeine, or smoke.  Empty your bladder before the test. Sit quietly with your back straight and both feet on the floor for at least 5 minutes. This helps you take your blood pressure while you feel comfortable and relaxed. How is the test done? · If your doctor recommends it, take your blood pressure twice a day. Take it in the morning and evening. · Sit with your arm slightly bent and resting on a table so that your upper arm is at the same level as your heart. · Use the same arm each time you take your blood pressure. · Place the blood pressure cuff on the bare skin of your upper arm. You may have to roll up your sleeve, remove your arm from the sleeve, or take your shirt off. · Wrap the blood pressure cuff around your upper arm so that the lower edge of the cuff is about 1 inch above the bend of your elbow. · Do not move, talk, or text while you take your blood pressure. Follow the instructions that came with your blood pressure monitor. They might be different from the following. · Press the on/off button on the automatic monitor. Then you may need to wait until the screen says the monitor is ready. · Press the start button. The cuff will inflate and deflate by itself. · Your blood pressure numbers will appear on the screen. · Wait one minute and take your blood pressure again. · If your monitor does not automatically save your numbers, write them in your log book, along with the date and time. Follow-up care is a key part of your treatment and safety. Be sure to make and go to all appointments, and call your doctor if you are having problems. It's also a good idea to keep a list of the medicines you take. Where can you learn more? Go to https://Hatsizeanamika.Pixelapse. org and sign in to your [employer] account. Enter C427 in the [employer] box to learn more about \"Home Blood Pressure Test: About This Test.\"     If you do not have an account, please click on the \"Sign Up Now\" link.   Current as of: August 31, 2020               Content Version: 12.9  © 4797-2080 Healthwise, Incorporated. Care instructions adapted under license by [facility] ([facility]). If you have questions about a medical condition or this instruction, always ask your healthcare professional. [address] any warranty or liability for your use of this information. Patient Education        Benign Prostatic Hyperplasia: Care Instructions  Your Care Instructions     Benign prostatic hyperplasia, or BPH, is an enlarged prostate gland. The prostate is a small gland that makes some of the fluid in semen. Prostate enlargement happens to almost all men as they age. It is usually not serious. BPH does not cause prostate cancer. As the prostate gets bigger, it may partly block the flow of urine. You may have a hard time getting a urine stream started or completely stopped. You may have a weak urine stream, or you may have to urinate more often than you used to, especially at night. Most men find these problems easy to manage. You do not need treatment unless your symptoms bother you a lot or you have other problems, such as bladder infections or stones. In these cases, medicines may help. Surgery is not needed unless the urine flow is blocked or the symptoms do not get better with medicine. Follow-up care is a key part of your treatment and safety. Be sure to make and go to all appointments, and call your doctor if you are having problems. It's also a good idea to know your test results and keep a list of the medicines you take. How can you care for yourself at home? · Urinate as much as you can, relax for a few moments, and then try to urinate again. · Sit on the toilet to urinate. · Avoid caffeine and alcohol. These drinks will increase how often you need to urinate. · Many over-the-counter cold and allergy medicines can make the symptoms of BPH worse. Avoid antihistamines, decongestants, and allergy pills, if you can. Read the warnings on the package.   · If you take any prescription medicines such as muscle relaxants, pain medicines, or medicines for depression or anxiety, ask your doctor or pharmacist if they can cause urination problems. When should you call for help? Call your doctor now or seek immediate medical care if:    · You cannot urinate at all.     · You have symptoms of a urinary infection. For example:  ? You have blood or pus in your urine. ? You have pain in your back just below your rib cage. This is called flank pain. ? You have a fever, chills, or body aches. ? It hurts to urinate. ? You have groin or belly pain. Watch closely for changes in your health, and be sure to contact your doctor if:    · It hurts when you ejaculate.     · Your urinary problems get a lot worse or bother you a lot. Where can you learn more? Go to https://chkristaeb.Sierra Photonics. org and sign in to your [employer] account. Enter X064 in the "[employer] box to learn more about \"Benign Prostatic Hyperplasia: Care Instructions. \"     If you do not have an account, please click on the \"Sign Up Now\" link. Current as of: February 10, 2021               Content Version: 12.9  © 9427-6491 [employer]. Care instructions adapted under license by [facility] ([facility]). If you have questions about a medical condition or this instruction, always ask your healthcare professional. [address] any warranty or liability for your use of this information.

## 2021-08-10 LAB
ANTI-NUCLEAR ANTIBODY (ANA): POSITIVE
ESTIMATED AVERAGE GLUCOSE: 119.8 MG/DL
HBA1C MFR BLD: 5.8 %

## 2021-08-11 LAB
ANA IGG, ELISA: DETECTED
C3 COMPLEMENT: 146 MG/DL (ref 88–201)
C4 COMPLEMENT: 35 MG/DL (ref 10–40)
RHEUMATOID FACTOR: <10 IU/ML (ref 0–14)

## 2021-08-12 ENCOUNTER — TELEPHONE (OUTPATIENT)
Dept: INTERNAL MEDICINE CLINIC | Age: 46
End: 2021-08-12

## 2021-08-12 LAB
SEX HORMONE BINDING GLOBULIN: 17 NMOL/L (ref 11–80)
TESTOSTERONE FREE-NONMALE: 62.6 PG/ML (ref 47–244)
TESTOSTERONE TOTAL: 234 NG/DL (ref 220–1000)

## 2021-08-12 NOTE — TELEPHONE ENCOUNTER
Please have the patient double his Terazosin to 2 tablets at bedtime and continue recording his blood pressure and let me know within a couple of days

## 2021-08-12 NOTE — TELEPHONE ENCOUNTER
Pt has been taking terazosin (HYTRIN) 1 MG capsule and noticed his blood pressure has been running higher than normal. He took his blood pressure a few times since taking the medication. It's read around 170/96 and 180/98. Pt last took it this morning and it was 170/96 He wanted to reach out to Dr. Gisell Zamarripa and let him know that it has been running high and consult with him on what he should do and whether he should continue to take the medication.      Best call back [phone]

## 2021-08-13 LAB
DOUBLE STRANDED DNA AB, IGG: 10 IU (ref 0–24)
THYROID PEROXIDASE (TPO) ABS: 0.3 IU/ML (ref 0–9)

## 2021-08-13 PROCEDURE — 86376 MICROSOMAL ANTIBODY EACH: CPT

## 2021-08-13 NOTE — TELEPHONE ENCOUNTER
Pt calling back with concerns of still having high blood pressure when he took it last night it was 152/9, and when he woke up today it was 170/87. He took the medication to help bring it down and he checked it while we were on the phone and it was 153/97. He wanted to get his concerns to Dr. Wendy Stephen before the weekend knowing the office will be closed.

## 2021-08-13 NOTE — TELEPHONE ENCOUNTER
As long as the patient does not have any other symptoms like chest pain shortness of breath or vision tingling numbness we can gradually lower his pressure even if it is in the 140s over 90s    For now he should continue with the terazosin 2 tablets at bedtime he can take an extra metoprolol for a total of 3 a day over the weekend see if he can come in Monday or Tuesday so that we reevaluate the whole situation

## 2021-08-14 LAB
ANA PATTERN: ABNORMAL
ANA TITER: ABNORMAL
ANTINUCLEAR AB INTERPRETIVE COMMENT: ABNORMAL
ANTINUCLEAR ANTIBODY, HEP-2, IGG: DETECTED
ENA TO SMITH (SM) ANTIBODY: 1 AU/ML (ref 0–40)
ENA TO SSB (LA) ANTIBODY: 0 AU/ML (ref 0–40)
SCLERODERMA (SCL-70) AB: 4 AU/ML (ref 0–40)
SSA 52 (RO) (ENA) AB, IGG: 2 AU/ML (ref 0–40)
SSA 60 (RO) (ENA) AB, IGG: 1 AU/ML (ref 0–40)

## 2021-08-16 ENCOUNTER — TELEPHONE (OUTPATIENT)
Dept: INTERNAL MEDICINE CLINIC | Age: 46
End: 2021-08-16

## 2021-08-16 DIAGNOSIS — I10 HTN (HYPERTENSION), BENIGN: ICD-10-CM

## 2021-08-16 LAB — ENA TO RNP ANTIBODY: NORMAL

## 2021-08-16 RX ORDER — TERAZOSIN 5 MG/1
5 CAPSULE ORAL NIGHTLY
Qty: 90 CAPSULE | Refills: 0 | Status: SHIPPED | OUTPATIENT
Start: 2021-08-16 | End: 2021-09-13 | Stop reason: SDUPTHER

## 2021-08-16 NOTE — TELEPHONE ENCOUNTER
We will go ahead and increase his Terazosin to 5 mg daily a prescription will be sent to the pharmacy

## 2021-09-13 ENCOUNTER — OFFICE VISIT (OUTPATIENT)
Dept: INTERNAL MEDICINE CLINIC | Age: 46
End: 2021-09-13
Payer: COMMERCIAL

## 2021-09-13 VITALS — HEART RATE: 74 BPM | SYSTOLIC BLOOD PRESSURE: 120 MMHG | DIASTOLIC BLOOD PRESSURE: 90 MMHG | OXYGEN SATURATION: 98 %

## 2021-09-13 DIAGNOSIS — E66.9 OBESITY (BMI 30.0-34.9): ICD-10-CM

## 2021-09-13 DIAGNOSIS — N28.9 RENAL INSUFFICIENCY: ICD-10-CM

## 2021-09-13 DIAGNOSIS — I10 HTN (HYPERTENSION), BENIGN: ICD-10-CM

## 2021-09-13 DIAGNOSIS — E78.2 MIXED HYPERLIPIDEMIA: Primary | ICD-10-CM

## 2021-09-13 PROBLEM — R21 SKIN RASH: Status: RESOLVED | Noted: 2021-08-09 | Resolved: 2021-09-13

## 2021-09-13 PROCEDURE — 1036F TOBACCO NON-USER: CPT | Performed by: INTERNAL MEDICINE

## 2021-09-13 PROCEDURE — G8428 CUR MEDS NOT DOCUMENT: HCPCS | Performed by: INTERNAL MEDICINE

## 2021-09-13 PROCEDURE — 99214 OFFICE O/P EST MOD 30 MIN: CPT | Performed by: INTERNAL MEDICINE

## 2021-09-13 PROCEDURE — G8417 CALC BMI ABV UP PARAM F/U: HCPCS | Performed by: INTERNAL MEDICINE

## 2021-09-13 RX ORDER — TERAZOSIN 5 MG/1
5 CAPSULE ORAL NIGHTLY
Qty: 90 CAPSULE | Refills: 0 | Status: SHIPPED | OUTPATIENT
Start: 2021-09-13 | End: 2021-10-20 | Stop reason: SDUPTHER

## 2021-09-13 RX ORDER — NIFEDIPINE 60 MG/1
60 TABLET, FILM COATED, EXTENDED RELEASE ORAL DAILY
Qty: 90 TABLET | Refills: 0 | Status: SHIPPED | OUTPATIENT
Start: 2021-09-13 | End: 2021-10-22

## 2021-09-13 RX ORDER — ATORVASTATIN CALCIUM 10 MG/1
10 TABLET, FILM COATED ORAL DAILY
Qty: 90 TABLET | Refills: 0 | Status: SHIPPED | OUTPATIENT
Start: 2021-09-13

## 2021-09-13 ASSESSMENT — PATIENT HEALTH QUESTIONNAIRE - PHQ9
SUM OF ALL RESPONSES TO PHQ QUESTIONS 1-9: 0
SUM OF ALL RESPONSES TO PHQ9 QUESTIONS 1 & 2: 0
1. LITTLE INTEREST OR PLEASURE IN DOING THINGS: 0
2. FEELING DOWN, DEPRESSED OR HOPELESS: 0
SUM OF ALL RESPONSES TO PHQ QUESTIONS 1-9: 0
SUM OF ALL RESPONSES TO PHQ QUESTIONS 1-9: 0

## 2021-09-13 ASSESSMENT — ENCOUNTER SYMPTOMS
CONSTIPATION: 0
ABDOMINAL PAIN: 0
BLOOD IN STOOL: 0
COUGH: 0
WHEEZING: 0
SHORTNESS OF BREATH: 0
COLOR CHANGE: 0
SINUS PAIN: 0
CHEST TIGHTNESS: 0

## 2021-09-13 NOTE — ASSESSMENT & PLAN NOTE
Discussed patient trying to achieve his goals lifestyle modification would be the centerpiece of his attempts but at the same time he wants to be back into bariatric surgery so we will get in for him there point he did not have the name he wants to go

## 2021-09-13 NOTE — ASSESSMENT & PLAN NOTE
Sam with patient his blood pressure control his blood pressure is very well controlled at home with his readings in the 120s to 130 with the lower readings in the 60s to 70s he is a little bit high today but we can repeat this test before he leaves

## 2021-09-13 NOTE — ASSESSMENT & PLAN NOTE
Reviewed with patient his risk profile his 10-year risk is about 9.8 at this point after discussing with the patient and the going over the side effects he is agreed to start on the medication

## 2021-09-13 NOTE — PROGRESS NOTES
Maria E Jain (:  1975) is a 48 y.o. male,New patient, here for evaluation of the following chief complaint(s):  Follow-up (change of b/p medicine fu.)         ASSESSMENT/PLAN:  1. Mixed hyperlipidemia  Assessment & Plan:   Reviewed with patient his risk profile his 10-year risk is about 9.8 at this point after discussing with the patient and the going over the side effects he is agreed to start on the medication  Orders:  -     atorvastatin (LIPITOR) 10 MG tablet; Take 1 tablet by mouth daily, Disp-90 tablet, R-0Normal  2. HTN (hypertension), benign  Assessment & Plan:   Sam with patient his blood pressure control his blood pressure is very well controlled at home with his readings in the 120s to 130 with the lower readings in the 60s to 70s he is a little bit high today but we can repeat this test before he leaves  Orders:  -     NIFEdipine (ADALAT CC) 60 MG extended release tablet; Take 1 tablet by mouth daily, Disp-90 tablet, R-0Normal  -     terazosin (HYTRIN) 5 MG capsule; Take 1 capsule by mouth nightly, Disp-90 capsule, R-0Normal  -     metoprolol tartrate (LOPRESSOR) 25 MG tablet; Take 1 tablet by mouth 2 times daily, Disp-180 tablet, R-3Normal  3. Renal insufficiency  4. Obesity (BMI 30.0-34. 9)  Assessment & Plan:   Discussed patient trying to achieve his goals lifestyle modification would be the centerpiece of his attempts but at the same time he wants to be back into bariatric surgery so we will get in for him there point he did not have the name he wants to go  The patient is interested in Covid vaccination all the questions were answered about the efficacy of thehe vaccination side effect profile  The 10-year ASCVD risk score (Chey Bernabe, et al., 2013) is: 9.8%    Values used to calculate the score:      Age: 48 years      Sex: Male      Is Non- : Yes      Diabetic: No      Tobacco smoker: No      Systolic Blood Pressure: 631 mmHg      Is BP treated: Yes      HDL Cholesterol: 44 mg/dL      Total Cholesterol: 204 mg/dL   Return in about 3 months (around 12/13/2021). Subjective   SUBJECTIVE/OBJECTIVE:    Lab Review   Lab Results   Component Value Date     08/09/2021     06/25/2021     06/24/2021    K 4.3 08/09/2021    K 3.6 06/25/2021    K 3.4 06/24/2021    K 3.8 06/20/2021    CO2 20 08/09/2021    CO2 20 06/25/2021    CO2 20 06/24/2021    BUN 22 08/09/2021    BUN 14 06/25/2021    BUN 13 06/24/2021    CREATININE 1.8 08/09/2021    CREATININE 1.3 06/25/2021    CREATININE 1.4 06/24/2021    GLUCOSE 83 08/09/2021    GLUCOSE 121 06/25/2021    GLUCOSE 125 06/24/2021    CALCIUM 9.3 08/09/2021    CALCIUM 9.0 06/25/2021    CALCIUM 9.0 06/24/2021     Lab Results   Component Value Date    WBC 4.7 08/09/2021    WBC 5.9 06/25/2021    WBC 6.6 06/24/2021    HGB 14.8 08/09/2021    HGB 15.3 06/25/2021    HGB 15.2 06/24/2021    HCT 44.1 08/09/2021    HCT 46.3 06/25/2021    HCT 45.0 06/24/2021    MCV 86.3 08/09/2021    MCV 87.0 06/25/2021    MCV 86.1 06/24/2021     08/09/2021     06/25/2021     06/24/2021     Lab Results   Component Value Date    CHOL 204 08/09/2021    CHOL 199 06/21/2021    TRIG 77 08/09/2021    TRIG 67 06/21/2021    HDL 44 08/09/2021    HDL 47 06/21/2021       Vitals 9/13/2021 8/9/2021 0/60/2492   SYSTOLIC 182 875 064   DIASTOLIC 354 90 75   Site - Left Upper Arm -   Position - Sitting -   Cuff Size - Medium Adult -   Pulse 74 72 78   Temp - - 97.1   Resp - - -   SpO2 98 98 -   Weight - 231 lb 12.8 oz 234 lb   Height - 6' 0\" 6' 0\"   Body mass index - 31.43 kg/m2 31.73 kg/m2   Pain Level - - -   Some recent data might be hidden       HPI    Review of Systems   Constitutional: Negative for activity change, appetite change, fatigue and unexpected weight change. HENT: Negative for congestion, ear pain and sinus pain. Respiratory: Negative for cough, chest tightness, shortness of breath and wheezing.     Cardiovascular: Negative for chest pain and palpitations. Gastrointestinal: Negative for abdominal pain, blood in stool and constipation. Endocrine: Negative for cold intolerance, heat intolerance and polyuria. Genitourinary: Negative for dysuria, frequency and urgency. Musculoskeletal: Negative for arthralgias and myalgias. Skin: Negative for color change and rash. Neurological: Negative for weakness and headaches. Hematological: Negative for adenopathy. Does not bruise/bleed easily. Psychiatric/Behavioral: Negative for agitation, dysphoric mood and sleep disturbance. Objective   Physical Exam  Constitutional:       General: He is not in acute distress. Appearance: Normal appearance. HENT:      Head: Normocephalic and atraumatic. Right Ear: Tympanic membrane normal.      Left Ear: Tympanic membrane normal.      Nose: Nose normal.   Eyes:      Extraocular Movements: Extraocular movements intact. Conjunctiva/sclera: Conjunctivae normal.      Pupils: Pupils are equal, round, and reactive to light. Neck:      Vascular: No carotid bruit. Cardiovascular:      Rate and Rhythm: Normal rate and regular rhythm. Pulses: Normal pulses. Heart sounds: No murmur heard. Pulmonary:      Effort: Pulmonary effort is normal. No respiratory distress. Breath sounds: Normal breath sounds. Abdominal:      General: Abdomen is flat. Bowel sounds are normal. There is no distension. Palpations: Abdomen is soft. Tenderness: There is no abdominal tenderness. Musculoskeletal:         General: No swelling, tenderness or deformity. Cervical back: Normal range of motion and neck supple. No rigidity or tenderness. Right lower leg: No edema. Left lower leg: No edema. Lymphadenopathy:      Cervical: No cervical adenopathy. Skin:     Coloration: Skin is not jaundiced. Findings: No bruising, erythema or lesion. Neurological:      General: No focal deficit present.       Mental

## 2021-09-15 ENCOUNTER — TELEPHONE (OUTPATIENT)
Dept: INTERNAL MEDICINE CLINIC | Age: 46
End: 2021-09-15

## 2021-09-15 RX ORDER — PREDNISONE 20 MG/1
20 TABLET ORAL 2 TIMES DAILY
Qty: 10 TABLET | Refills: 0 | Status: SHIPPED | OUTPATIENT
Start: 2021-09-15 | End: 2021-09-16 | Stop reason: SDUPTHER

## 2021-09-15 NOTE — TELEPHONE ENCOUNTER
Patient calling in because he he came in on 09/13 for a BP check, and patient stated that he forgot to mention that his skin rash that he was seen for on 08/09 is back. Patient's wondering if the methylPREDNISolone (MEDROL DOSEPACK) 4 MG tablet can be refilled and sent to the [facility] [address]      Please call and advise

## 2021-09-15 NOTE — TELEPHONE ENCOUNTER
Advised patient that I did send him with a prednisone pack if it comes back again he would need to come back in for an evaluation

## 2021-09-15 NOTE — TELEPHONE ENCOUNTER
Pt calling asking for the prednisone sent to [facility] be resent to [facility] [address] [facility] [facility]---(that's where he goes now) please. Thanks.

## 2021-09-16 RX ORDER — PREDNISONE 20 MG/1
20 TABLET ORAL 2 TIMES DAILY
Qty: 10 TABLET | Refills: 0 | Status: SHIPPED | OUTPATIENT
Start: 2021-09-16 | End: 2021-09-21

## 2021-10-08 ENCOUNTER — TELEPHONE (OUTPATIENT)
Dept: INTERNAL MEDICINE CLINIC | Age: 46
End: 2021-10-08

## 2021-10-08 NOTE — TELEPHONE ENCOUNTER
Patient called to request and appointment. He was given one. He would like to know if he should start prednisone for his rash. Rash returned about 2 weeks ago it has been painful and starting to feel raw, it flares up more at night. He tried hydrocortisone lotion, calamine lotion, aloe and benadryl cream nothing has helped. He is concerned something is wrong with his pancreas.  Patient's words \"I think I have pancreatic cancer\"    The patient has been having indigestion at night it's been happening for about 2 yrs and he takes Pepto bismal.

## 2021-10-08 NOTE — TELEPHONE ENCOUNTER
Spoke with patient. He is aware that Dr. Naveed Kay is out of office until 10/18/21. Does have an appointment on 10/18/21. Wanted me to send message to  Physician on call to see if he needed to start Prednisone. Please call patient and advise.

## 2021-10-20 ENCOUNTER — OFFICE VISIT (OUTPATIENT)
Dept: INTERNAL MEDICINE CLINIC | Age: 46
End: 2021-10-20
Payer: COMMERCIAL

## 2021-10-20 VITALS
WEIGHT: 237.2 LBS | HEART RATE: 103 BPM | OXYGEN SATURATION: 97 % | DIASTOLIC BLOOD PRESSURE: 80 MMHG | BODY MASS INDEX: 32.17 KG/M2 | SYSTOLIC BLOOD PRESSURE: 130 MMHG

## 2021-10-20 DIAGNOSIS — N28.9 RENAL INSUFFICIENCY: ICD-10-CM

## 2021-10-20 DIAGNOSIS — I10 HTN (HYPERTENSION), BENIGN: ICD-10-CM

## 2021-10-20 DIAGNOSIS — R73.03 PREDIABETES: ICD-10-CM

## 2021-10-20 DIAGNOSIS — R21 SKIN RASH: Primary | ICD-10-CM

## 2021-10-20 LAB
A/G RATIO: 1.5 (ref 1.1–2.2)
ALBUMIN SERPL-MCNC: 4.6 G/DL (ref 3.4–5)
ALP BLD-CCNC: 90 U/L (ref 40–129)
ALT SERPL-CCNC: 20 U/L (ref 10–40)
ANION GAP SERPL CALCULATED.3IONS-SCNC: 13 MMOL/L (ref 3–16)
AST SERPL-CCNC: 17 U/L (ref 15–37)
BILIRUB SERPL-MCNC: 0.3 MG/DL (ref 0–1)
BUN BLDV-MCNC: 18 MG/DL (ref 7–20)
CALCIUM SERPL-MCNC: 9.6 MG/DL (ref 8.3–10.6)
CHLORIDE BLD-SCNC: 107 MMOL/L (ref 99–110)
CO2: 19 MMOL/L (ref 21–32)
CREAT SERPL-MCNC: 1.6 MG/DL (ref 0.9–1.3)
GFR AFRICAN AMERICAN: 56
GFR NON-AFRICAN AMERICAN: 46
GLOBULIN: 3.1 G/DL
GLUCOSE BLD-MCNC: 93 MG/DL (ref 70–99)
POTASSIUM SERPL-SCNC: 4.1 MMOL/L (ref 3.5–5.1)
SODIUM BLD-SCNC: 139 MMOL/L (ref 136–145)
TOTAL PROTEIN: 7.7 G/DL (ref 6.4–8.2)

## 2021-10-20 PROCEDURE — 99214 OFFICE O/P EST MOD 30 MIN: CPT | Performed by: INTERNAL MEDICINE

## 2021-10-20 PROCEDURE — G8484 FLU IMMUNIZE NO ADMIN: HCPCS | Performed by: INTERNAL MEDICINE

## 2021-10-20 PROCEDURE — G8427 DOCREV CUR MEDS BY ELIG CLIN: HCPCS | Performed by: INTERNAL MEDICINE

## 2021-10-20 PROCEDURE — G8417 CALC BMI ABV UP PARAM F/U: HCPCS | Performed by: INTERNAL MEDICINE

## 2021-10-20 PROCEDURE — 1036F TOBACCO NON-USER: CPT | Performed by: INTERNAL MEDICINE

## 2021-10-20 RX ORDER — CLOTRIMAZOLE AND BETAMETHASONE DIPROPIONATE 10; .64 MG/G; MG/G
CREAM TOPICAL
Qty: 45 G | Refills: 2 | Status: SHIPPED | OUTPATIENT
Start: 2021-10-20 | End: 2022-06-07 | Stop reason: SDUPTHER

## 2021-10-20 RX ORDER — TERAZOSIN 5 MG/1
5 CAPSULE ORAL NIGHTLY
Qty: 90 CAPSULE | Refills: 0 | Status: SHIPPED | OUTPATIENT
Start: 2021-10-20 | End: 2021-12-23 | Stop reason: SDUPTHER

## 2021-10-20 NOTE — PROGRESS NOTES
08/09/2021    HGB 15.3 06/25/2021    HGB 15.2 06/24/2021    HCT 44.1 08/09/2021    HCT 46.3 06/25/2021    HCT 45.0 06/24/2021    MCV 86.3 08/09/2021    MCV 87.0 06/25/2021    MCV 86.1 06/24/2021     08/09/2021     06/25/2021     06/24/2021     Lab Results   Component Value Date    CHOL 204 08/09/2021    CHOL 199 06/21/2021    TRIG 77 08/09/2021    TRIG 67 06/21/2021    HDL 44 08/09/2021    HDL 47 06/21/2021       Vitals 10/20/2021 9/13/2021 1/36/7504   SYSTOLIC 991 201 529   DIASTOLIC 80 90 403   Site - - -   Position - - -   Cuff Size - - -   Pulse 103 - 74   Temp - - -   Resp - - -   SpO2 97 - 98   Weight 237 lb 3.2 oz - -   Height - - -   Body mass index - - -   Pain Level - - -   Some recent data might be hidden       Rash  This is a recurrent problem. The current episode started in the past 7 days. The affected locations include the torso. Review of Systems   Skin: Positive for rash. Objective   Physical Exam  Constitutional:       General: He is not in acute distress. Appearance: Normal appearance. HENT:      Head: Normocephalic and atraumatic. Right Ear: Tympanic membrane normal.      Left Ear: Tympanic membrane normal.      Nose: Nose normal.   Eyes:      Extraocular Movements: Extraocular movements intact. Conjunctiva/sclera: Conjunctivae normal.      Pupils: Pupils are equal, round, and reactive to light. Neck:      Vascular: No carotid bruit. Cardiovascular:      Rate and Rhythm: Normal rate and regular rhythm. Pulses: Normal pulses. Heart sounds: No murmur heard. Pulmonary:      Effort: Pulmonary effort is normal. No respiratory distress. Breath sounds: Normal breath sounds. Abdominal:      General: Abdomen is flat. Bowel sounds are normal. There is no distension. Palpations: Abdomen is soft. Tenderness: There is no abdominal tenderness. Musculoskeletal:         General: No swelling, tenderness or deformity. Cervical back: Normal range of motion and neck supple. No rigidity or tenderness. Right lower leg: No edema. Left lower leg: No edema. Lymphadenopathy:      Cervical: No cervical adenopathy. Skin:     Coloration: Skin is not jaundiced. Findings: No bruising, erythema or lesion. Neurological:      General: No focal deficit present. Mental Status: He is alert and oriented to person, place, and time. Cranial Nerves: No cranial nerve deficit. Motor: No weakness. Gait: Gait normal.            This dictation was generated by voice recognition computer software. Although all attempts are made to edit the dictation for accuracy, there may be errors in the transcription that are not intended. An electronic signature was used to authenticate this note.     --John Panchal MD

## 2021-10-21 DIAGNOSIS — N28.9 RENAL INSUFFICIENCY: Primary | ICD-10-CM

## 2021-10-22 DIAGNOSIS — I10 HTN (HYPERTENSION), BENIGN: ICD-10-CM

## 2021-10-22 RX ORDER — NIFEDIPINE 60 MG/1
TABLET, FILM COATED, EXTENDED RELEASE ORAL
Qty: 90 TABLET | Refills: 0 | Status: SHIPPED | OUTPATIENT
Start: 2021-10-22 | End: 2022-02-08 | Stop reason: SDUPTHER

## 2021-11-10 ENCOUNTER — TELEPHONE (OUTPATIENT)
Dept: INTERNAL MEDICINE CLINIC | Age: 46
End: 2021-11-10

## 2021-11-10 RX ORDER — ALBUTEROL SULFATE 90 UG/1
2 AEROSOL, METERED RESPIRATORY (INHALATION) 4 TIMES DAILY PRN
Qty: 54 G | Refills: 1 | Status: ON HOLD | OUTPATIENT
Start: 2021-11-10 | End: 2021-11-26 | Stop reason: SDUPTHER

## 2021-11-10 NOTE — TELEPHONE ENCOUNTER
Patient calling requesting refill of albuterol inhaler    Last OV 10/20/21  Next OV 01/24/22  Last recommended OV 01/20/22     Please send to [address] on [facility].

## 2021-11-25 ENCOUNTER — HOSPITAL ENCOUNTER (INPATIENT)
Age: 46
LOS: 1 days | Discharge: HOME | DRG: 137 | End: 2021-11-26
Attending: EMERGENCY MEDICINE | Admitting: INTERNAL MEDICINE
Payer: COMMERCIAL

## 2021-11-25 ENCOUNTER — APPOINTMENT (OUTPATIENT)
Dept: CT IMAGING | Age: 46
DRG: 137 | End: 2021-11-25
Payer: COMMERCIAL

## 2021-11-25 ENCOUNTER — APPOINTMENT (OUTPATIENT)
Dept: GENERAL RADIOLOGY | Age: 46
DRG: 137 | End: 2021-11-25
Payer: COMMERCIAL

## 2021-11-25 DIAGNOSIS — J18.9 PNEUMONIA OF BOTH LUNGS DUE TO INFECTIOUS ORGANISM, UNSPECIFIED PART OF LUNG: Primary | ICD-10-CM

## 2021-11-25 DIAGNOSIS — J18.9 SEPSIS DUE TO PNEUMONIA (HCC): ICD-10-CM

## 2021-11-25 DIAGNOSIS — A41.9 SEPSIS DUE TO PNEUMONIA (HCC): ICD-10-CM

## 2021-11-25 LAB
A/G RATIO: 0.7 (ref 1.1–2.2)
A/G RATIO: 0.8 (ref 1.1–2.2)
ALBUMIN SERPL-MCNC: 3.1 G/DL (ref 3.4–5)
ALBUMIN SERPL-MCNC: 3.2 G/DL (ref 3.4–5)
ALP BLD-CCNC: 109 U/L (ref 40–129)
ALP BLD-CCNC: 118 U/L (ref 40–129)
ALT SERPL-CCNC: 103 U/L (ref 10–40)
ALT SERPL-CCNC: 88 U/L (ref 10–40)
ANION GAP SERPL CALCULATED.3IONS-SCNC: 16 MMOL/L (ref 3–16)
ANION GAP SERPL CALCULATED.3IONS-SCNC: 9 MMOL/L (ref 3–16)
AST SERPL-CCNC: 49 U/L (ref 15–37)
AST SERPL-CCNC: 75 U/L (ref 15–37)
BASOPHILS ABSOLUTE: 0 K/UL (ref 0–0.2)
BASOPHILS RELATIVE PERCENT: 0.3 %
BILIRUB SERPL-MCNC: 0.3 MG/DL (ref 0–1)
BILIRUB SERPL-MCNC: <0.2 MG/DL (ref 0–1)
BUN BLDV-MCNC: 17 MG/DL (ref 7–20)
BUN BLDV-MCNC: 27 MG/DL (ref 7–20)
CALCIUM SERPL-MCNC: 8.9 MG/DL (ref 8.3–10.6)
CALCIUM SERPL-MCNC: 9.1 MG/DL (ref 8.3–10.6)
CHLORIDE BLD-SCNC: 103 MMOL/L (ref 99–110)
CHLORIDE BLD-SCNC: 105 MMOL/L (ref 99–110)
CO2: 17 MMOL/L (ref 21–32)
CO2: 22 MMOL/L (ref 21–32)
CREAT SERPL-MCNC: 1.3 MG/DL (ref 0.9–1.3)
CREAT SERPL-MCNC: 1.6 MG/DL (ref 0.9–1.3)
D DIMER: 426 NG/ML DDU (ref 0–229)
EKG ATRIAL RATE: 108 BPM
EKG DIAGNOSIS: NORMAL
EKG P AXIS: 17 DEGREES
EKG P-R INTERVAL: 134 MS
EKG Q-T INTERVAL: 366 MS
EKG QRS DURATION: 96 MS
EKG QTC CALCULATION (BAZETT): 490 MS
EKG R AXIS: 46 DEGREES
EKG T AXIS: 22 DEGREES
EKG VENTRICULAR RATE: 108 BPM
EOSINOPHILS ABSOLUTE: 0.1 K/UL (ref 0–0.6)
EOSINOPHILS RELATIVE PERCENT: 1.5 %
GFR AFRICAN AMERICAN: 56
GFR AFRICAN AMERICAN: >60
GFR NON-AFRICAN AMERICAN: 46
GFR NON-AFRICAN AMERICAN: 59
GLUCOSE BLD-MCNC: 106 MG/DL (ref 70–99)
GLUCOSE BLD-MCNC: 110 MG/DL (ref 70–99)
GLUCOSE BLD-MCNC: 124 MG/DL (ref 70–99)
GLUCOSE BLD-MCNC: 144 MG/DL (ref 70–99)
HCT VFR BLD CALC: 38.3 % (ref 40.5–52.5)
HEMOGLOBIN: 12.6 G/DL (ref 13.5–17.5)
LACTIC ACID: 1.1 MMOL/L (ref 0.4–2)
LYMPHOCYTES ABSOLUTE: 1.3 K/UL (ref 1–5.1)
LYMPHOCYTES RELATIVE PERCENT: 14.3 %
MCH RBC QN AUTO: 28.7 PG (ref 26–34)
MCHC RBC AUTO-ENTMCNC: 33 G/DL (ref 31–36)
MCV RBC AUTO: 87 FL (ref 80–100)
MONOCYTES ABSOLUTE: 1.2 K/UL (ref 0–1.3)
MONOCYTES RELATIVE PERCENT: 13.3 %
NEUTROPHILS ABSOLUTE: 6.3 K/UL (ref 1.7–7.7)
NEUTROPHILS RELATIVE PERCENT: 70.6 %
PDW BLD-RTO: 14.5 % (ref 12.4–15.4)
PERFORMED ON: ABNORMAL
PERFORMED ON: ABNORMAL
PLATELET # BLD: 861 K/UL (ref 135–450)
PMV BLD AUTO: 7.5 FL (ref 5–10.5)
POTASSIUM REFLEX MAGNESIUM: 3.9 MMOL/L (ref 3.5–5.1)
POTASSIUM SERPL-SCNC: 4.8 MMOL/L (ref 3.5–5.1)
PRO-BNP: 40 PG/ML (ref 0–124)
PROCALCITONIN: 0.12 NG/ML (ref 0–0.15)
RAPID INFLUENZA  B AGN: NEGATIVE
RAPID INFLUENZA A AGN: NEGATIVE
RBC # BLD: 4.4 M/UL (ref 4.2–5.9)
SARS-COV-2, NAAT: NOT DETECTED
SARS-COV-2, PCR: DETECTED
SODIUM BLD-SCNC: 136 MMOL/L (ref 136–145)
SODIUM BLD-SCNC: 136 MMOL/L (ref 136–145)
TOTAL PROTEIN: 7.3 G/DL (ref 6.4–8.2)
TOTAL PROTEIN: 7.6 G/DL (ref 6.4–8.2)
TROPONIN: <0.01 NG/ML
WBC # BLD: 8.9 K/UL (ref 4–11)

## 2021-11-25 PROCEDURE — 83605 ASSAY OF LACTIC ACID: CPT

## 2021-11-25 PROCEDURE — 6370000000 HC RX 637 (ALT 250 FOR IP): Performed by: EMERGENCY MEDICINE

## 2021-11-25 PROCEDURE — 94640 AIRWAY INHALATION TREATMENT: CPT

## 2021-11-25 PROCEDURE — 71045 X-RAY EXAM CHEST 1 VIEW: CPT

## 2021-11-25 PROCEDURE — 99284 EMERGENCY DEPT VISIT MOD MDM: CPT

## 2021-11-25 PROCEDURE — 2580000003 HC RX 258: Performed by: EMERGENCY MEDICINE

## 2021-11-25 PROCEDURE — U0003 INFECTIOUS AGENT DETECTION BY NUCLEIC ACID (DNA OR RNA); SEVERE ACUTE RESPIRATORY SYNDROME CORONAVIRUS 2 (SARS-COV-2) (CORONAVIRUS DISEASE [COVID-19]), AMPLIFIED PROBE TECHNIQUE, MAKING USE OF HIGH THROUGHPUT TECHNOLOGIES AS DESCRIBED BY CMS-2020-01-R: HCPCS

## 2021-11-25 PROCEDURE — 93005 ELECTROCARDIOGRAM TRACING: CPT | Performed by: EMERGENCY MEDICINE

## 2021-11-25 PROCEDURE — 84484 ASSAY OF TROPONIN QUANT: CPT

## 2021-11-25 PROCEDURE — 6360000002 HC RX W HCPCS: Performed by: INTERNAL MEDICINE

## 2021-11-25 PROCEDURE — 6360000002 HC RX W HCPCS: Performed by: NURSE PRACTITIONER

## 2021-11-25 PROCEDURE — G0378 HOSPITAL OBSERVATION PER HR: HCPCS

## 2021-11-25 PROCEDURE — 96376 TX/PRO/DX INJ SAME DRUG ADON: CPT

## 2021-11-25 PROCEDURE — 83036 HEMOGLOBIN GLYCOSYLATED A1C: CPT

## 2021-11-25 PROCEDURE — 83880 ASSAY OF NATRIURETIC PEPTIDE: CPT

## 2021-11-25 PROCEDURE — 1200000000 HC SEMI PRIVATE

## 2021-11-25 PROCEDURE — 96372 THER/PROPH/DIAG INJ SC/IM: CPT

## 2021-11-25 PROCEDURE — 6370000000 HC RX 637 (ALT 250 FOR IP): Performed by: INTERNAL MEDICINE

## 2021-11-25 PROCEDURE — 84145 PROCALCITONIN (PCT): CPT

## 2021-11-25 PROCEDURE — 96365 THER/PROPH/DIAG IV INF INIT: CPT

## 2021-11-25 PROCEDURE — 36415 COLL VENOUS BLD VENIPUNCTURE: CPT

## 2021-11-25 PROCEDURE — 87635 SARS-COV-2 COVID-19 AMP PRB: CPT

## 2021-11-25 PROCEDURE — 6360000004 HC RX CONTRAST MEDICATION: Performed by: EMERGENCY MEDICINE

## 2021-11-25 PROCEDURE — 94761 N-INVAS EAR/PLS OXIMETRY MLT: CPT

## 2021-11-25 PROCEDURE — 80074 ACUTE HEPATITIS PANEL: CPT

## 2021-11-25 PROCEDURE — 85379 FIBRIN DEGRADATION QUANT: CPT

## 2021-11-25 PROCEDURE — U0005 INFEC AGEN DETEC AMPLI PROBE: HCPCS

## 2021-11-25 PROCEDURE — 80053 COMPREHEN METABOLIC PANEL: CPT

## 2021-11-25 PROCEDURE — 87040 BLOOD CULTURE FOR BACTERIA: CPT

## 2021-11-25 PROCEDURE — 96375 TX/PRO/DX INJ NEW DRUG ADDON: CPT

## 2021-11-25 PROCEDURE — 71260 CT THORAX DX C+: CPT

## 2021-11-25 PROCEDURE — 96367 TX/PROPH/DG ADDL SEQ IV INF: CPT

## 2021-11-25 PROCEDURE — 6370000000 HC RX 637 (ALT 250 FOR IP): Performed by: NURSE PRACTITIONER

## 2021-11-25 PROCEDURE — 87804 INFLUENZA ASSAY W/OPTIC: CPT

## 2021-11-25 PROCEDURE — 87631 RESP VIRUS 3-5 TARGETS: CPT

## 2021-11-25 PROCEDURE — 85025 COMPLETE CBC W/AUTO DIFF WBC: CPT

## 2021-11-25 PROCEDURE — 93010 ELECTROCARDIOGRAM REPORT: CPT | Performed by: INTERNAL MEDICINE

## 2021-11-25 PROCEDURE — 2580000003 HC RX 258: Performed by: INTERNAL MEDICINE

## 2021-11-25 RX ORDER — GUAIFENESIN 100 MG/5ML
200 SOLUTION ORAL ONCE
Status: COMPLETED | OUTPATIENT
Start: 2021-11-25 | End: 2021-11-25

## 2021-11-25 RX ORDER — DEXTROSE MONOHYDRATE 50 MG/ML
100 INJECTION, SOLUTION INTRAVENOUS PRN
Status: DISCONTINUED | OUTPATIENT
Start: 2021-11-25 | End: 2021-11-26 | Stop reason: HOSPADM

## 2021-11-25 RX ORDER — GUAIFENESIN/DEXTROMETHORPHAN 100-10MG/5
5 SYRUP ORAL EVERY 4 HOURS PRN
Status: DISCONTINUED | OUTPATIENT
Start: 2021-11-25 | End: 2021-11-26 | Stop reason: HOSPADM

## 2021-11-25 RX ORDER — INSULIN LISPRO 100 [IU]/ML
0-3 INJECTION, SOLUTION INTRAVENOUS; SUBCUTANEOUS NIGHTLY
Status: DISCONTINUED | OUTPATIENT
Start: 2021-11-25 | End: 2021-11-26 | Stop reason: HOSPADM

## 2021-11-25 RX ORDER — MAGNESIUM SULFATE 1 G/100ML
1000 INJECTION INTRAVENOUS ONCE
Status: COMPLETED | OUTPATIENT
Start: 2021-11-25 | End: 2021-11-25

## 2021-11-25 RX ORDER — HYDROCODONE BITARTRATE AND ACETAMINOPHEN 5; 325 MG/1; MG/1
1 TABLET ORAL EVERY 6 HOURS PRN
Status: DISCONTINUED | OUTPATIENT
Start: 2021-11-25 | End: 2021-11-26 | Stop reason: HOSPADM

## 2021-11-25 RX ORDER — ACETAMINOPHEN 500 MG
1000 TABLET ORAL ONCE
Status: COMPLETED | OUTPATIENT
Start: 2021-11-25 | End: 2021-11-25

## 2021-11-25 RX ORDER — ACETAMINOPHEN 650 MG/1
650 SUPPOSITORY RECTAL EVERY 6 HOURS PRN
Status: DISCONTINUED | OUTPATIENT
Start: 2021-11-25 | End: 2021-11-26 | Stop reason: HOSPADM

## 2021-11-25 RX ORDER — IPRATROPIUM BROMIDE AND ALBUTEROL SULFATE 2.5; .5 MG/3ML; MG/3ML
1 SOLUTION RESPIRATORY (INHALATION)
Status: DISCONTINUED | OUTPATIENT
Start: 2021-11-25 | End: 2021-11-25 | Stop reason: ALTCHOICE

## 2021-11-25 RX ORDER — ACETAMINOPHEN 325 MG/1
650 TABLET ORAL EVERY 6 HOURS PRN
Status: DISCONTINUED | OUTPATIENT
Start: 2021-11-25 | End: 2021-11-26 | Stop reason: HOSPADM

## 2021-11-25 RX ORDER — NICOTINE POLACRILEX 4 MG
15 LOZENGE BUCCAL PRN
Status: DISCONTINUED | OUTPATIENT
Start: 2021-11-25 | End: 2021-11-26 | Stop reason: HOSPADM

## 2021-11-25 RX ORDER — DOXAZOSIN MESYLATE 4 MG/1
4 TABLET ORAL NIGHTLY
Status: DISCONTINUED | OUTPATIENT
Start: 2021-11-25 | End: 2021-11-26 | Stop reason: HOSPADM

## 2021-11-25 RX ORDER — SODIUM CHLORIDE, SODIUM LACTATE, POTASSIUM CHLORIDE, CALCIUM CHLORIDE 600; 310; 30; 20 MG/100ML; MG/100ML; MG/100ML; MG/100ML
INJECTION, SOLUTION INTRAVENOUS CONTINUOUS
Status: ACTIVE | OUTPATIENT
Start: 2021-11-25 | End: 2021-11-26

## 2021-11-25 RX ORDER — HYDRALAZINE HYDROCHLORIDE 25 MG/1
25 TABLET, FILM COATED ORAL EVERY 8 HOURS SCHEDULED
Status: DISCONTINUED | OUTPATIENT
Start: 2021-11-25 | End: 2021-11-25

## 2021-11-25 RX ORDER — DEXAMETHASONE SODIUM PHOSPHATE 4 MG/ML
6 INJECTION, SOLUTION INTRA-ARTICULAR; INTRALESIONAL; INTRAMUSCULAR; INTRAVENOUS; SOFT TISSUE DAILY
Status: DISCONTINUED | OUTPATIENT
Start: 2021-11-25 | End: 2021-11-26 | Stop reason: HOSPADM

## 2021-11-25 RX ORDER — SODIUM CHLORIDE 0.9 % (FLUSH) 0.9 %
10 SYRINGE (ML) INJECTION PRN
Status: DISCONTINUED | OUTPATIENT
Start: 2021-11-25 | End: 2021-11-26 | Stop reason: HOSPADM

## 2021-11-25 RX ORDER — BENZONATATE 100 MG/1
100 CAPSULE ORAL ONCE
Status: COMPLETED | OUTPATIENT
Start: 2021-11-25 | End: 2021-11-25

## 2021-11-25 RX ORDER — POLYETHYLENE GLYCOL 3350 17 G/17G
17 POWDER, FOR SOLUTION ORAL DAILY PRN
Status: DISCONTINUED | OUTPATIENT
Start: 2021-11-25 | End: 2021-11-26 | Stop reason: HOSPADM

## 2021-11-25 RX ORDER — NIFEDIPINE 30 MG/1
60 TABLET, EXTENDED RELEASE ORAL DAILY
Status: DISCONTINUED | OUTPATIENT
Start: 2021-11-25 | End: 2021-11-26 | Stop reason: HOSPADM

## 2021-11-25 RX ORDER — DEXTROSE MONOHYDRATE 25 G/50ML
12.5 INJECTION, SOLUTION INTRAVENOUS PRN
Status: DISCONTINUED | OUTPATIENT
Start: 2021-11-25 | End: 2021-11-26 | Stop reason: HOSPADM

## 2021-11-25 RX ORDER — ALBUTEROL SULFATE 90 UG/1
2 AEROSOL, METERED RESPIRATORY (INHALATION) EVERY 4 HOURS PRN
Status: DISCONTINUED | OUTPATIENT
Start: 2021-11-25 | End: 2021-11-25

## 2021-11-25 RX ORDER — ONDANSETRON 4 MG/1
4 TABLET, ORALLY DISINTEGRATING ORAL EVERY 6 HOURS PRN
Status: DISCONTINUED | OUTPATIENT
Start: 2021-11-25 | End: 2021-11-26 | Stop reason: HOSPADM

## 2021-11-25 RX ORDER — HEPARIN SODIUM 5000 [USP'U]/ML
5000 INJECTION, SOLUTION INTRAVENOUS; SUBCUTANEOUS EVERY 8 HOURS SCHEDULED
Status: DISCONTINUED | OUTPATIENT
Start: 2021-11-25 | End: 2021-11-25 | Stop reason: ALTCHOICE

## 2021-11-25 RX ORDER — 0.9 % SODIUM CHLORIDE 0.9 %
30 INTRAVENOUS SOLUTION INTRAVENOUS ONCE
Status: COMPLETED | OUTPATIENT
Start: 2021-11-25 | End: 2021-11-25

## 2021-11-25 RX ORDER — ONDANSETRON 2 MG/ML
INJECTION INTRAMUSCULAR; INTRAVENOUS
Status: DISPENSED
Start: 2021-11-25 | End: 2021-11-25

## 2021-11-25 RX ORDER — DOXAZOSIN MESYLATE 4 MG/1
4 TABLET ORAL DAILY
Status: DISCONTINUED | OUTPATIENT
Start: 2021-11-25 | End: 2021-11-25

## 2021-11-25 RX ORDER — ALBUTEROL SULFATE 2.5 MG/3ML
2.5 SOLUTION RESPIRATORY (INHALATION) EVERY 4 HOURS PRN
Status: DISCONTINUED | OUTPATIENT
Start: 2021-11-25 | End: 2021-11-26 | Stop reason: HOSPADM

## 2021-11-25 RX ORDER — INSULIN LISPRO 100 [IU]/ML
0-6 INJECTION, SOLUTION INTRAVENOUS; SUBCUTANEOUS
Status: DISCONTINUED | OUTPATIENT
Start: 2021-11-25 | End: 2021-11-26 | Stop reason: HOSPADM

## 2021-11-25 RX ORDER — DOXAZOSIN MESYLATE 4 MG/1
4 TABLET ORAL DAILY
Status: DISCONTINUED | OUTPATIENT
Start: 2021-11-25 | End: 2021-11-25 | Stop reason: SDUPTHER

## 2021-11-25 RX ORDER — ALBUTEROL SULFATE 90 UG/1
2 AEROSOL, METERED RESPIRATORY (INHALATION) 4 TIMES DAILY
Status: DISCONTINUED | OUTPATIENT
Start: 2021-11-25 | End: 2021-11-26 | Stop reason: HOSPADM

## 2021-11-25 RX ORDER — SODIUM CHLORIDE 0.9 % (FLUSH) 0.9 %
10 SYRINGE (ML) INJECTION EVERY 12 HOURS SCHEDULED
Status: DISCONTINUED | OUTPATIENT
Start: 2021-11-25 | End: 2021-11-26 | Stop reason: HOSPADM

## 2021-11-25 RX ORDER — ATORVASTATIN CALCIUM 10 MG/1
10 TABLET, FILM COATED ORAL DAILY
Status: DISCONTINUED | OUTPATIENT
Start: 2021-11-25 | End: 2021-11-26 | Stop reason: HOSPADM

## 2021-11-25 RX ORDER — SODIUM CHLORIDE 9 MG/ML
25 INJECTION, SOLUTION INTRAVENOUS PRN
Status: DISCONTINUED | OUTPATIENT
Start: 2021-11-25 | End: 2021-11-26 | Stop reason: HOSPADM

## 2021-11-25 RX ORDER — NIFEDIPINE 60 MG/1
1 TABLET, FILM COATED, EXTENDED RELEASE ORAL DAILY
Status: DISCONTINUED | OUTPATIENT
Start: 2021-11-25 | End: 2021-11-25 | Stop reason: SDUPTHER

## 2021-11-25 RX ADMIN — GUAIFENESIN 200 MG: 100 SOLUTION ORAL at 09:26

## 2021-11-25 RX ADMIN — ENOXAPARIN SODIUM 40 MG: 100 INJECTION SUBCUTANEOUS at 21:22

## 2021-11-25 RX ADMIN — Medication 2 PUFF: at 21:12

## 2021-11-25 RX ADMIN — SODIUM CHLORIDE 2328 ML: 9 INJECTION, SOLUTION INTRAVENOUS at 07:00

## 2021-11-25 RX ADMIN — HYDROCODONE BITARTRATE AND ACETAMINOPHEN 1 TABLET: 5; 325 TABLET ORAL at 11:42

## 2021-11-25 RX ADMIN — CEFTRIAXONE 2000 MG: 2 INJECTION, POWDER, FOR SOLUTION INTRAMUSCULAR; INTRAVENOUS at 09:35

## 2021-11-25 RX ADMIN — Medication 2 PUFF: at 16:18

## 2021-11-25 RX ADMIN — DOXAZOSIN 4 MG: 4 TABLET ORAL at 21:22

## 2021-11-25 RX ADMIN — Medication 2 PUFF: at 21:14

## 2021-11-25 RX ADMIN — Medication 2 PUFF: at 04:06

## 2021-11-25 RX ADMIN — HYDROCODONE BITARTRATE AND ACETAMINOPHEN 1 TABLET: 5; 325 TABLET ORAL at 17:56

## 2021-11-25 RX ADMIN — NIFEDIPINE 60 MG: 30 TABLET, FILM COATED, EXTENDED RELEASE ORAL at 11:42

## 2021-11-25 RX ADMIN — BENZONATATE 100 MG: 100 CAPSULE ORAL at 04:16

## 2021-11-25 RX ADMIN — Medication 2 PUFF: at 04:05

## 2021-11-25 RX ADMIN — ACETAMINOPHEN 1000 MG: 500 TABLET ORAL at 04:16

## 2021-11-25 RX ADMIN — SODIUM CHLORIDE, PRESERVATIVE FREE 10 ML: 5 INJECTION INTRAVENOUS at 21:24

## 2021-11-25 RX ADMIN — IOPAMIDOL 75 ML: 755 INJECTION, SOLUTION INTRAVENOUS at 04:17

## 2021-11-25 RX ADMIN — DEXAMETHASONE SODIUM PHOSPHATE 6 MG: 4 INJECTION, SOLUTION INTRAMUSCULAR; INTRAVENOUS at 07:45

## 2021-11-25 RX ADMIN — MAGNESIUM SULFATE HEPTAHYDRATE 1000 MG: 1 INJECTION, SOLUTION INTRAVENOUS at 11:55

## 2021-11-25 RX ADMIN — ATORVASTATIN CALCIUM 10 MG: 10 TABLET, FILM COATED ORAL at 17:56

## 2021-11-25 RX ADMIN — SODIUM CHLORIDE, POTASSIUM CHLORIDE, SODIUM LACTATE AND CALCIUM CHLORIDE 1000 ML: 600; 310; 30; 20 INJECTION, SOLUTION INTRAVENOUS at 09:34

## 2021-11-25 ASSESSMENT — ENCOUNTER SYMPTOMS
SHORTNESS OF BREATH: 1
ABDOMINAL PAIN: 0
BACK PAIN: 1
SORE THROAT: 1

## 2021-11-25 ASSESSMENT — PAIN SCALES - GENERAL
PAINLEVEL_OUTOF10: 7
PAINLEVEL_OUTOF10: 7
PAINLEVEL_OUTOF10: 8
PAINLEVEL_OUTOF10: 10
PAINLEVEL_OUTOF10: 10
PAINLEVEL_OUTOF10: 8

## 2021-11-25 ASSESSMENT — PAIN DESCRIPTION - LOCATION: LOCATION: CHEST;BACK

## 2021-11-25 ASSESSMENT — PAIN DESCRIPTION - PAIN TYPE: TYPE: ACUTE PAIN

## 2021-11-25 ASSESSMENT — PAIN DESCRIPTION - FREQUENCY: FREQUENCY: CONTINUOUS

## 2021-11-25 ASSESSMENT — PAIN DESCRIPTION - ONSET: ONSET: ON-GOING

## 2021-11-25 ASSESSMENT — PAIN DESCRIPTION - ORIENTATION: ORIENTATION: UPPER

## 2021-11-25 ASSESSMENT — PAIN DESCRIPTION - DESCRIPTORS: DESCRIPTORS: STABBING

## 2021-11-25 NOTE — ED PROVIDER NOTES
[facility]      Pt Name: Arsenio Deng  MRN: [id number]  [place] 1975  Date of evaluation: 11/25/2021  Provider: Shelbie España MD    CHIEF COMPLAINT       Chief Complaint   Patient presents with    Shortness of Breath     pt son recently dx with covid and family isolated x14 days. \"whole family was sick\". pt is not getting any better. states having increase sob and cp.  Chest Pain         HISTORY OF PRESENT ILLNESS   (Location/Symptom, Timing/Onset,Context/Setting, Quality, Duration, Modifying Factors, Severity)  Note limiting factors. Arsenio Deng is a 48 y.o. male who presents to the emergency department for cough for 3 weeks, son has [address]. 1 week after son got sick patient started with cough. Fever, vomiting, diarrhea, fatigue. Chest pain and SOB for 3 weeks. Has not been able to get out of bed until today which prompted the visit tonight. Over the past week the SOB and chest pain has been worsening. Nyquil has not helped. Lost 25 lbs in the past 3 weeks. Nursing notes were reviewed. REVIEW OF SYSTEMS    (2-9 systems for level 4, 10 or more for level 5)     Review of Systems   Constitutional: Positive for appetite change and fever. HENT: Positive for sore throat. Respiratory: Positive for shortness of breath. Cardiovascular: Positive for chest pain. Gastrointestinal: Negative for abdominal pain. Endocrine: Negative for polyuria. Genitourinary: Positive for decreased urine volume. Musculoskeletal: Positive for back pain and myalgias. Neurological: Positive for headaches. PAST MEDICAL HISTORY     Past Medical History:   Diagnosis Date    Asthma     BPH associated with nocturia 8/9/2021    Hypertension     Mixed hyperlipidemia 8/9/2021    Obesity (BMI 30.0-34. 9) 8/9/2021    Prediabetes 10/20/2021         SURGICALHISTORY     History reviewed. No pertinent surgical history.       CURRENT MEDICATIONS       Previous Medications    ALBUTEROL SULFATE HFA (VENTOLIN HFA) 108 (90 BASE) MCG/ACT INHALER    Inhale 2 puffs into the lungs 4 times daily as needed for Wheezing    ATORVASTATIN (LIPITOR) 10 MG TABLET    Take 1 tablet by mouth daily    BLOOD PRESSURE KIT    1 kit by Does not apply route daily    CLOTRIMAZOLE-BETAMETHASONE (LOTRISONE) 1-0.05 % CREAM    Apply topically 2 times daily. HYDRALAZINE (APRESOLINE) 25 MG TABLET    TAKE 1 TABLET BY MOUTH EVERY 8 HOURS    METOPROLOL TARTRATE (LOPRESSOR) 25 MG TABLET    Take 1 tablet by mouth 2 times daily    NIFEDIPINE (ADALAT CC) 60 MG EXTENDED RELEASE TABLET    TAKE 1 TABLET BY MOUTH EVERY DAY    TERAZOSIN (HYTRIN) 5 MG CAPSULE    Take 1 capsule by mouth nightly       ALLERGIES     Codeine, Lisinopril, and Other    FAMILY HISTORY     History reviewed. No pertinent family history. SOCIAL HISTORY       Social History     Socioeconomic History    Marital status: Single     Spouse name: None    Number of children: None    Years of education: None    Highest education level: None   Occupational History    None   Tobacco Use    Smoking status: Never Smoker    Smokeless tobacco: Never Used   Vaping Use    Vaping Use: Never used   Substance and Sexual Activity    Alcohol use: No    Drug use: No    Sexual activity: None   Other Topics Concern    None   Social History Narrative    None     Social Determinants of Health     Financial Resource Strain: Medium Risk    Difficulty of Paying Living Expenses: Somewhat hard   Food Insecurity: Food Insecurity Present    Worried About Running Out of Food in the Last Year: Sometimes true    Naun of Food in the Last Year: Sometimes true   Transportation Needs:     Lack of Transportation (Medical): Not on file    Lack of Transportation (Non-Medical):  Not on file   Physical Activity:     Days of Exercise per Week: Not on file    Minutes of Exercise per Session: Not on file   Stress:     Feeling of Stress : Not on file   Social Connections:     Frequency of Communication with Friends and Family: Not on file    Frequency of Social Gatherings with Friends and Family: Not on file    Attends Religion Services: Not on file    Active Member of [address] [place] [address] [employer] or Organizations: Not on file    Attends Club or Organization Meetings: Not on file    Marital Status: Not on file   Intimate Partner Violence:     Fear of Current or Ex-Partner: Not on file    Emotionally Abused: Not on file    Physically Abused: Not on file    Sexually Abused: Not on file   Housing Stability:     Unable to Pay for Housing in the Last Year: Not on file    Number of [place] in the Last Year: Not on file    Unstable Housing in the Last Year: Not on file       SCREENINGS             PHYSICAL EXAM    (up to 7 for level 4, 8 or more for level 5)     ED Triage Vitals   BP Temp Temp Source Pulse Resp SpO2 Height Weight   11/25/21 0248 11/25/21 0251 11/25/21 0251 11/25/21 0248 11/25/21 0248 11/25/21 0248 -- 11/25/21 0248   (!) 144/87 98.9 °F (37.2 °C) Oral 109 27 94 %  237 lb (107.5 kg)       Physical Exam        DIAGNOSTIC RESULTS     EKG: All EKG's are interpreted by the Emergency Department Physician who either signs or Co-signs this chart in the absence of a cardiologist.    12 lead EKG shows sinus tachycardia at a rate of 108 bpm, MI interval QRS QTC normal.  Normal axis no acute ischemic findings. Besides the rate no significant changes when compared to June 2021. RADIOLOGY:   Non-plain film images such as CT, Ultrasound and MRI are read by the radiologist. Plain radiographic images are visualized and preliminarily interpreted by the emergency physician with the below findings:    Bilateral bibasilar opacities    Interpretation per the Radiologist below, if available at the time of this note:    CT CHEST PULMONARY EMBOLISM W CONTRAST   Final Result   1. No scan evidence for pulmonary embolus.    2.  Commonly reported imaging features of COVID-19 pneumonia are present. Other processes such as influenza pneumonia and organizing pneumonia, as can   be seen with drug toxicity and connective tissue disease, can cause a similar   imaging pattern. PneTyp         XR CHEST PORTABLE   Final Result   Commonly reported imaging features of COVID-19 pneumonia are present. Other   processes such as influenza pneumonia and organizing pneumonia, as can be   seen with drug toxicity and connective tissue disease, can cause a similar   imaging pattern.  PneTyp               ED BEDSIDE ULTRASOUND:   Performed by ED Physician - none    LABS:  Labs Reviewed   CBC WITH AUTO DIFFERENTIAL - Abnormal; Notable for the following components:       Result Value    Hemoglobin 12.6 (*)     Hematocrit 38.3 (*)     Platelets 207 (*)     All other components within normal limits    Narrative:     Performed at:  [facility] [address], [facility], [address]   Phone [phone]   COMPREHENSIVE METABOLIC PANEL W/ REFLEX TO MG FOR LOW K - Abnormal; Notable for the following components:    CO2 17 (*)     Glucose 110 (*)     BUN 27 (*)     CREATININE 1.6 (*)     GFR Non- 46 (*)     GFR  56 (*)     Albumin 3.1 (*)     Albumin/Globulin Ratio 0.7 (*)      (*)     AST 75 (*)     All other components within normal limits    Narrative:     Performed at:  [facility]  [address] [facility], [address]   Phone [phone]   D-DIMER, QUANTITATIVE - Abnormal; Notable for the following components:    D-Dimer, Quant 426 (*)     All other components within normal limits    Narrative:     Performed at:  [facility]  [address] [facility], [address]   Phone [phone], RAPID    Narrative:     Performed at:  [facility]  [address] [facility], [address]   Phone [phone]   Manan ZENG ANTIGENS    Narrative:     Performed at:  [facility] [address],  [facility], [address]   Phone [phone]   CULTURE, BLOOD 1   CULTURE, BLOOD 2   RESPIRATORY VIRUS MINI PANEL, MOLECULAR   TROPONIN    Narrative:     Performed at:  [facility] [address],  [facility], [address]   Phone [phone]   BRAIN NATRIURETIC PEPTIDE    Narrative:     Performed at:  [facility] [address],  [facility], [address]   Phone [phone]   LACTIC ACID, PLASMA    Narrative:     Performed at:  [facility] [address],  [facility], [address]   Phone [phone]   PROCALCITONIN   COVID-19   COVID-19   COVID-19   COVID-19   HEPATITIS PANEL, ACUTE       All other labs were within normal range or not returned as of this dictation. EMERGENCY DEPARTMENT COURSE and DIFFERENTIAL DIAGNOSIS/MDM:   Vitals:    Vitals:    11/25/21 0248 11/25/21 0251   BP: (!) 144/87    Pulse: 109    Resp: 27    Temp:  98.9 °F (37.2 °C)   TempSrc:  Oral   SpO2: 94%    Weight: 237 lb (107.5 kg)        Adult male who comes in with worsening chest pain cough and shortness of breath. The patient has been losing weight and unable to eat and drink. He has a known exposure to COVID-19 he has not been vaccinated. Laboratory studies chest x-ray and EKG ordered. He is placed on cardiac blood pressure and pulse oximetry monitoring. Cardiac monitor as read and interpreted myself shows sinus tachycardia. The patient is tachypneic. Initially he is not given any IV fluids as I am concerned for Covid pneumonia. The patient's Covid test and flu test are negative. His chest x-ray is read and interpreted by the radiologist.  There is concern for Covid pneumonia. D-dimer is elevated and therefore CT PE was ordered. The CT PE associated interpretation essentially considering Covid pneumonia.   He did have a negative COVID-19 test however his symptoms have been ongoing for 3 weeks and this was a rapid Covid. I still also consider concomitant bacterial superimposed bacterial infection and therefore the patient is given antibiotics and is given a sepsis fluid bolus. His lactic acid is normal.  Given the patient's duration of symptoms and the fact that he is worsening with the chest x-ray and CT scan findings I believe it is best interest to be admitted to the hospital for further care. A consult dysphasia the hospitalist on duty who has agreed to admit this patient to observation. The patient and his visitor both expressed understanding and they are agreeable with the treatment plan. The patient was given Tylenol and Tessalon Perles for his symptoms. He is also given guaifenesin after the Tessalon Perles did not help. SEP-1 CORE MEASURE DATA    Classification: sepsis    Amount of fluids ordered: at least 30mL/kg based on ideal body weight due to obesity defined as BMI >30 (patient's BMI is Body mass index is 32.14 kg/m².)    Time at which sepsis was identified: 0557    Broad-spectrum antibiotics chosen:  based on sepsis order-set for a suspected source of: Pulmonary - Community Acquired    Repeat lactate level: not indicated    On reassessment after fluid resuscitation:   pending, to be completed by inpatient team               CRITICAL CARE TIME   Total Critical Care time was 30 minutes, excluding separately reportable procedures. There was a high probability of clinically significant/life threatening deterioration in the patient's condition which required my urgent intervention. CONSULTS:  IP CONSULT TO HOSPITALIST    PROCEDURES:       Procedures    FINAL IMPRESSION      1. Pneumonia of both lungs due to infectious organism, unspecified part of lung    2.  Sepsis due to pneumonia [facility])          DISPOSITION/PLAN   DISPOSITION Admitted 11/25/2021 06:33:56 AM      PATIENT REFERREDTO:  No follow-up provider specified.     DISCHARGEMEDICATIONS:  New Prescriptions    No medications on file          (Please note that portions of this note were completed with a voice recognition program.  Efforts were made to edit the dictations but occasionally words are mis-transcribed.)    Kelli Gtz MD (electronically signed)  Attending Emergency Physician        Kelli Gtz MD  11/25/21 9223

## 2021-11-25 NOTE — PROGRESS NOTES
[facility] PROGRESS NOTE    11/25/2021 12:02 PM        Name: Guevara Dominguez . Admitted: 11/25/2021  Primary Care Provider: Lloyd Mesa MD (Tel: [phone])      Subjective:  .     Admitted this am with possible COVID  Reports entire family had previously tested positive  He reports nausea, vomiting and diarrhea for the past 2 weeks with 30 lb weight loss     Reviewed interval ancillary notes    Current Medications  albuterol sulfate  (90 Base) MCG/ACT inhaler 2 puff, 4x daily   And  ipratropium (ATROVENT HFA) 17 MCG/ACT inhaler 2 puff, 4x daily  atorvastatin (LIPITOR) tablet 10 mg, Daily  hydrALAZINE (APRESOLINE) tablet 25 mg, 3 times per day  metoprolol tartrate (LOPRESSOR) tablet 25 mg, BID  doxazosin (CARDURA) tablet 4 mg, Daily  doxycycline (VIBRAMYCIN) 100 mg in dextrose 5 % 100 mL IVPB, Q12H  lactated ringers infusion, Continuous  cefTRIAXone (ROCEPHIN) 2000 mg IVPB in D5W 50ml minibag, Q24H  guaiFENesin-dextromethorphan (ROBITUSSIN DM) 100-10 MG/5ML syrup 5 mL, Q4H PRN  sodium chloride flush 0.9 % injection 10 mL, 2 times per day  sodium chloride flush 0.9 % injection 10 mL, PRN  0.9 % sodium chloride infusion, PRN  polyethylene glycol (GLYCOLAX) packet 17 g, Daily PRN  acetaminophen (TYLENOL) tablet 650 mg, Q6H PRN   Or  acetaminophen (TYLENOL) suppository 650 mg, Q6H PRN  albuterol (PROVENTIL) nebulizer solution 2.5 mg, Q4H PRN  ipratropium-albuterol (DUONEB) nebulizer solution 1 ampule, Q4H WA  insulin lispro (1 Unit Dial) 0-6 Units, TID WC  insulin lispro (1 Unit Dial) 0-3 Units, Nightly  glucose (GLUTOSE) 40 % oral gel 15 g, PRN  dextrose 50 % IV solution, PRN  glucagon (rDNA) injection 1 mg, PRN  dextrose 5 % solution, PRN  heparin (porcine) injection 5,000 Units, 3 times per day  magnesium sulfate 1000 mg in dextrose 5% 100 mL IVPB, Once  ondansetron (ZOFRAN) 4 MG/2ML injection,   dexamethasone (DECADRON) injection 6 mg, Daily  HYDROcodone-acetaminophen (NORCO) 5-325 MG per tablet 1 tablet, Q6H PRN  NIFEdipine (PROCARDIA XL) extended release tablet 60 mg, Daily  doxazosin (CARDURA) tablet 4 mg, Nightly  ondansetron (ZOFRAN-ODT) disintegrating tablet 4 mg, Q6H PRN        Objective:  BP (!) 152/93   Pulse 98   Temp 98.5 °F (36.9 °C) (Oral)   Resp 18   Wt 237 lb (107.5 kg)   SpO2 96%   BMI 32.14 kg/m²   No intake or output data in the 24 hours ending 11/25/21 1202   Wt Readings from Last 3 Encounters:   11/25/21 237 lb (107.5 kg)   10/20/21 237 lb 3.2 oz (107.6 kg)   08/09/21 231 lb 12.8 oz (105.1 kg)       General appearance:  Appears comfortable  Eyes: Sclera clear. Pupils equal.  ENT: Moist oral mucosa. Trachea midline, no adenopathy. Cardiovascular: Regular rhythm, normal S1, S2. No murmur. No edema in lower extremities  Respiratory: Not using accessory muscles. Decreased in bases    GI: Abdomen soft, no tenderness, not distended, normal bowel sounds  Musculoskeletal: No cyanosis in digits, neck supple  Neurology: CN 2-12 grossly intact. No speech or motor deficits  Psych: Normal affect. Alert and oriented in time, place and person  Skin: Warm, dry, normal turgor    Labs and Tests:  CBC:   Recent Labs     11/25/21  0300   WBC 8.9   HGB 12.6*   *     BMP:    Recent Labs     11/25/21  0300      K 3.9      CO2 17*   BUN 27*   CREATININE 1.6*   GLUCOSE 110*     Hepatic:   Recent Labs     11/25/21  0300   AST 75*   *   BILITOT 0.3   ALKPHOS 118     No scan evidence for pulmonary embolus. 2.  Commonly reported imaging features of COVID-19 pneumonia are present. Other processes such as influenza pneumonia and organizing pneumonia, as can   be seen with drug toxicity and connective tissue disease, can cause a similar   imaging pattern. PneTyp     Pro calcitonin 0.12      Problem List  Active Problems:    Pneumonia  Resolved Problems:    * No resolved hospital problems.  * Assessment & Plan:   1. Probable COVID:  PCR testing is pending , he is not hypoxic. He has been started on doxy and rocephin, however pro calcitonin is low. Will likely stop antibiotics if PCR positive. 2. HTN:  Resume home meds of procardia and Cardura  3. FATUMA:  Likely due to volume loss,  Gentle hydration, avoid nephrotoxic agents   4. Nausea/ vomiting/ weight loss: likely due to covid. Will provide gentle hydration and supportive care       Diet: ADULT DIET;  Regular; 4 carb choices (60 gm/meal)  Code:Full Code  DVT PPX      NISHANT Dia - CNP   11/25/2021 12:02 PM

## 2021-11-25 NOTE — ED NOTES
Report called and given to 5T RN. No further questions at this time. Pt A/O, no sign of distress at time of report.       Omar Nevarez RN  11/25/21 2621

## 2021-11-25 NOTE — PROGRESS NOTES
Messaged Nurse practitioner:     11/25/21 9:29 AM   [phone] Hospital or Facility: [facility] From: Jose M Wilcox RE: Cornell Sloan 1975 RM: [id number] Patient's chest and back pain are 10/10. Can I get pain meds ordered? Need Callback: NO CALLBACK REQ 5T ONCOLOGY  Read 9:29 AM     11/25/21 9:41 AM   /91. Pt. states he only takes terazosin and nifedipine for BP at home now  Read 9:56 AM      11/25/21 11:27 AM   Can I get something for nausea for this patient?  He states he gets nausea when taking nifedipine  Read 11:58 AM

## 2021-11-25 NOTE — H&P
Hospital Medicine History and Physical    11/25/2021    Date of Admission: 11/25/2021    Date of Service: Pt seen/examined on 11/25/2021 and admitted to inpatient. Assessment/plan:  1. COVID-19 pneumonia, suspected. Rapid COVID-19 test is negative; will send for PCR. Will check procalcitonin level. In the meantime, will start doxycycline and Rocephin with plan to discontinue antibiotics once COVID-19 test/PCR is resulted and confirms COVID-19 as a source of underlying infection. Start IV decadron. Robitussin ordered for cough. 2. Generalized weakness. Likely secondary to underlying infectious process. Maintain on fall precautions. Consider therapy evaluation prior to discharge. 3. Elevated LFTs. Secondary to underlying infectious process. Checking acute hepatitis profile. Monitor LFTs closely. 4. Prolonged QTc noted on EKG. Will avoid QT prolonging medications. Give 1 g of magnesium sulfate. Monitor on telemetry. 5. Other comorbidities: history of asthma, essential hypertension, hyperlipidemia, obesity with BMI of 32 kg/m², BPH, prediabetes, CKD stage III (with baseline creatinine around 1.3-1.6). Activities: Up with assist  Prophylaxis: Subcutaneous heparin  Code status: Full code    ==========================================================  Chief complaint:  Chief Complaint   Patient presents with    Shortness of Breath     pt son recently dx with covid and family isolated x14 days. \"whole family was sick\". pt is not getting any better. states having increase sob and cp.  Chest Pain       History of Presenting Illness:   This is a pleasant 48 y.o. male who is unvaccinated, with history of asthma, essential hypertension, hyperlipidemia, obesity with BMI of 32 kg/m², BPH, prediabetes, CKD stage III (with baseline creatinine around 1.3-1.6), who presents to the emergency room with complaints of shortness of breath, chest discomfort, cough, fever, nausea, vomiting, diarrhea, generalized weakness. Symptoms have been ongoing for the past 3 weeks. Patient has had decreased p.o. intak and has lost at least 25 pounds over the last 3 weeks. Symptoms started about a week after his son was diagnosed with COVID-19. Past Medical History:      Diagnosis Date    Asthma     BPH associated with nocturia 8/9/2021    Hypertension     Mixed hyperlipidemia 8/9/2021    Obesity (BMI 30.0-34. 9) 8/9/2021    Prediabetes 10/20/2021       Past Surgical History:  History reviewed. No pertinent surgical history. Medications (prior to admission):  Prior to Admission medications    Medication Sig Start Date End Date Taking? Authorizing Provider   hydrALAZINE (APRESOLINE) 25 MG tablet TAKE 1 TABLET BY MOUTH EVERY 8 HOURS 11/14/21  Yes Denis Jacobo MD   albuterol sulfate HFA (VENTOLIN HFA) 108 (90 Base) MCG/ACT inhaler Inhale 2 puffs into the lungs 4 times daily as needed for Wheezing 11/10/21  Yes Allyson Moscoso MD   NIFEdipine (ADALAT CC) 60 MG extended release tablet TAKE 1 TABLET BY MOUTH EVERY DAY 10/22/21  Yes Allyson Moscoso MD   clotrimazole-betamethasone (LOTRISONE) 1-0.05 % cream Apply topically 2 times daily. 10/20/21  Yes Allyson Moscoso MD   terazosin (HYTRIN) 5 MG capsule Take 1 capsule by mouth nightly 10/20/21  Yes Allyson Moscoso MD   atorvastatin (LIPITOR) 10 MG tablet Take 1 tablet by mouth daily 9/13/21  Yes Allyson Moscoso MD   metoprolol tartrate (LOPRESSOR) 25 MG tablet Take 1 tablet by mouth 2 times daily 9/13/21  Yes Allyson Moscoso MD   Blood Pressure KIT 1 kit by Does not apply route daily 8/9/21  Yes Allyson Moscoso MD       Allergy(ies):  Codeine, Lisinopril, and Other    Social History:  TOBACCO:  reports that he has never smoked. He has never used smokeless tobacco.  ETOH:  reports no history of alcohol use. Family History:  History reviewed. No pertinent family history. Review of Systems:  Pertinent positives are listed in HPI.  At least 10-point ROS reviewed and were negative. Vitals and physical examination:  BP (!) 144/87   Pulse 109   Temp 98.9 °F (37.2 °C) (Oral)   Resp 27   Wt 237 lb (107.5 kg)   SpO2 94%   BMI 32.14 kg/m²   Gen/overall appearance: Not in acute distress. Alert. Oriented x3. Head: Normocephalic, atraumatic  Eyes: EOMI, good acuity  ENT: Oral mucosa moist  Neck: No JVD, thyromegaly  CVS: Nml S1S2, no MRG, RRR  Pulm: Clear bilaterally. No wheezes. No crackles. Gastrointestinal: Soft, NT/ND, +BS  Musculoskeletal: No edema. Warm  Neuro: No focal deficit. Moves extremity spontaneously. Psychiatry: Appropriate affect. Not agitated. Skin: Warm, dry with normal turgor. No rash  Capillary refill: Brisk,< 3 seconds   Peripheral Pulses: +2 palpable, equal bilaterally       Labs/imaging/EKG:  CBC:   Recent Labs     11/25/21  0300   WBC 8.9   HGB 12.6*   *     BMP:    Recent Labs     11/25/21  0300      K 3.9      CO2 17*   BUN 27*   CREATININE 1.6*   GLUCOSE 110*     Hepatic:   Recent Labs     11/25/21  0300   AST 75*   *   BILITOT 0.3   ALKPHOS 118       XR CHEST PORTABLE    Result Date: 11/25/2021  EXAMINATION: ONE XRAY VIEW OF THE CHEST 11/25/2021 2:57 am COMPARISON: 06/20/2021 HISTORY: ORDERING SYSTEM PROVIDED HISTORY: SOB TECHNOLOGIST PROVIDED HISTORY: Reason for exam:->SOB FINDINGS: There are poorly defined airspace opacities bilaterally. The heart size is normal.  The costophrenic angles are sharp. There is no discernible pneumothorax. Commonly reported imaging features of COVID-19 pneumonia are present. Other processes such as influenza pneumonia and organizing pneumonia, as can be seen with drug toxicity and connective tissue disease, can cause a similar imaging pattern.  PneTyp     CT CHEST PULMONARY EMBOLISM W CONTRAST    Result Date: 11/25/2021  EXAMINATION: CTA OF THE CHEST 11/25/2021 4:02 am TECHNIQUE: CTA of the chest was performed after the administration of intravenous contrast.  Multiplanar reformatted images are provided for review. MIP images are provided for review. Dose modulation, iterative reconstruction, and/or weight based adjustment of the mA/kV was utilized to reduce the radiation dose to as low as reasonably achievable. COMPARISON: 09/26/2019 HISTORY: ORDERING SYSTEM PROVIDED HISTORY: possible covid, questionable concommitant PE TECHNOLOGIST PROVIDED HISTORY: Reason for exam:->possible covid, questionable concommitant PE Decision Support Exception - unselect if not a suspected or confirmed emergency medical condition->Emergency Medical Condition (MA) Reason for Exam: possible covid, questionable concommitant PE Acuity: Acute Type of Exam: Initial Relevant Medical/Surgical History: Shortness of Breath (pt son recently dx with covid and family isolated x14 days. \"whole family was sick\". pt is not getting any better. states having increase sob and cp. ) FINDINGS: Pulmonary Arteries: Pulmonary arteries are adequately opacified for evaluation. No evidence of intraluminal filling defect to suggest pulmonary embolism. Main pulmonary artery is normal in caliber. Mediastinum: Mediastinal and bilateral hilar lymph nodes are likely reactive. The heart and pericardium demonstrate no acute abnormality. There is no acute abnormality of the thoracic aorta. Lungs/pleura: There are patchy predominantly peripheral ground-glass opacities throughout both lungs. There is no pneumothorax or pleural effusion. The central airways are patent. Upper Abdomen: Limited images of the upper abdomen are unremarkable. Soft Tissues/Bones: No acute bone or soft tissue abnormality. 1. No scan evidence for pulmonary embolus. 2.  Commonly reported imaging features of COVID-19 pneumonia are present. Other processes such as influenza pneumonia and organizing pneumonia, as can be seen with drug toxicity and connective tissue disease, can cause a similar imaging pattern. PneTyp       EKG: Sinus tachycardia, rate 108 beats per minutes. No acute ST/T changes. . I reviewed EKG. Discussed with ER physician.       Thank you Luh Gomez MD for the opportunity to be involved in this patient's care.    -----------------------------  Tashi Sanz MD  [facility]

## 2021-11-25 NOTE — RT PROTOCOL NOTE
RT Inhaler-Nebulizer Bronchodilator Protocol Note    There is a bronchodilator order in the chart from a provider indicating to follow the RT Bronchodilator Protocol and there is an Initiate RT Inhaler-Nebulizer Bronchodilator Protocol order as well (see protocol at bottom of note). CXR Findings:  XR CHEST PORTABLE    Result Date: 11/25/2021  Commonly reported imaging features of COVID-19 pneumonia are present. Other processes such as influenza pneumonia and organizing pneumonia, as can be seen with drug toxicity and connective tissue disease, can cause a similar imaging pattern. PneTyp       The findings from the last RT Protocol Assessment were as follows:   History Pulmonary Disease: Chronic pulmonary disease  Respiratory Pattern: Dyspnea on exertion or RR 21-25 bpm  Breath Sounds: Intermittent or unilateral wheezes  Cough: Strong, productive  Indication for Bronchodilator Therapy:    Bronchodilator Assessment Score: 9    Aerosolized bronchodilator medication orders have been revised according to the RT Inhaler-Nebulizer Bronchodilator Protocol below. Respiratory Therapist to perform RT Therapy Protocol Assessment initially then follow the protocol. Repeat RT Therapy Protocol Assessment PRN for score 0-3 or on second treatment, BID, and PRN for scores above 3. No Indications - adjust the frequency to every 6 hours PRN wheezing or bronchospasm, if no treatments needed after 48 hours then discontinue using Per Protocol order mode. If indication present, adjust the RT bronchodilator orders based on the Bronchodilator Assessment Score as indicated below. Use Inhaler orders unless patient has one or more of the following: on home nebulizer, not able to hold breath for 10 seconds, is not alert and oriented, cannot activate and use MDI correctly, or respiratory rate 25 breaths per minute or more, then use the equivalent nebulizer order(s) with same Frequency and PRN reasons based on the score.   If a patient is on this medication at home then do not decrease Frequency below that used at home. 0-3 - enter or revise RT bronchodilator order(s) to equivalent RT Bronchodilator order with Frequency of every 4 hours PRN for wheezing or increased work of breathing using Per Protocol order mode. 4-6 - enter or revise RT Bronchodilator order(s) to two equivalent RT bronchodilator orders with one order with BID Frequency and one order with Frequency of every 4 hours PRN wheezing or increased work of breathing using Per Protocol order mode. 7-10 - enter or revise RT Bronchodilator order(s) to two equivalent RT bronchodilator orders with one order with TID Frequency and one order with Frequency of every 4 hours PRN wheezing or increased work of breathing using Per Protocol order mode. 11-13 - enter or revise RT Bronchodilator order(s) to one equivalent RT bronchodilator order with QID Frequency and an Albuterol order with Frequency of every 4 hours PRN wheezing or increased work of breathing using Per Protocol order mode. Greater than 13 - enter or revise RT Bronchodilator order(s) to one equivalent RT bronchodilator order with every 4 hours Frequency and an Albuterol order with Frequency of every 2 hours PRN wheezing or increased work of breathing using Per Protocol order mode. RT to enter RT Home Evaluation for COPD & MDI Assessment order using Per Protocol order mode.     Electronically signed by Mera Payne RCP on 11/25/2021 at 5:05 AM

## 2021-11-25 NOTE — ED NOTES
Pt transported to room 582 via ER [facility]. Vibromycin IVPB infusing at time of transport. Pt alert, no sign of distress at time of transport.       Pura Bui RN  11/25/21 [id number]

## 2021-11-26 VITALS
WEIGHT: 218.03 LBS | HEART RATE: 100 BPM | RESPIRATION RATE: 20 BRPM | BODY MASS INDEX: 29.53 KG/M2 | HEIGHT: 72 IN | TEMPERATURE: 97.5 F | DIASTOLIC BLOOD PRESSURE: 69 MMHG | SYSTOLIC BLOOD PRESSURE: 112 MMHG | OXYGEN SATURATION: 96 %

## 2021-11-26 LAB
ALBUMIN SERPL-MCNC: 3.2 G/DL (ref 3.4–5)
ANION GAP SERPL CALCULATED.3IONS-SCNC: 11 MMOL/L (ref 3–16)
BASOPHILS ABSOLUTE: 0 K/UL (ref 0–0.2)
BASOPHILS RELATIVE PERCENT: 0.3 %
BUN BLDV-MCNC: 16 MG/DL (ref 7–20)
C-REACTIVE PROTEIN: 29 MG/L (ref 0–5.1)
CALCIUM SERPL-MCNC: 9.3 MG/DL (ref 8.3–10.6)
CHLORIDE BLD-SCNC: 105 MMOL/L (ref 99–110)
CO2: 20 MMOL/L (ref 21–32)
CREAT SERPL-MCNC: 1.3 MG/DL (ref 0.9–1.3)
EOSINOPHILS ABSOLUTE: 0 K/UL (ref 0–0.6)
EOSINOPHILS RELATIVE PERCENT: 0.2 %
ESTIMATED AVERAGE GLUCOSE: 119.8 MG/DL
GFR AFRICAN AMERICAN: >60
GFR NON-AFRICAN AMERICAN: 59
GLUCOSE BLD-MCNC: 100 MG/DL (ref 70–99)
GLUCOSE BLD-MCNC: 108 MG/DL (ref 70–99)
GLUCOSE BLD-MCNC: 93 MG/DL (ref 70–99)
HAV IGM SER IA-ACNC: NORMAL
HBA1C MFR BLD: 5.8 %
HCT VFR BLD CALC: 33.5 % (ref 40.5–52.5)
HEMOGLOBIN: 11.3 G/DL (ref 13.5–17.5)
HEPATITIS B CORE IGM ANTIBODY: NORMAL
HEPATITIS B SURFACE ANTIGEN INTERPRETATION: NORMAL
HEPATITIS C ANTIBODY INTERPRETATION: NORMAL
L. PNEUMOPHILA SEROGP 1 UR AG: NORMAL
LYMPHOCYTES ABSOLUTE: 1.1 K/UL (ref 1–5.1)
LYMPHOCYTES RELATIVE PERCENT: 13.4 %
MAGNESIUM: 2.2 MG/DL (ref 1.8–2.4)
MCH RBC QN AUTO: 29.3 PG (ref 26–34)
MCHC RBC AUTO-ENTMCNC: 33.7 G/DL (ref 31–36)
MCV RBC AUTO: 87.2 FL (ref 80–100)
MONOCYTES ABSOLUTE: 1 K/UL (ref 0–1.3)
MONOCYTES RELATIVE PERCENT: 12.3 %
NEUTROPHILS ABSOLUTE: 5.9 K/UL (ref 1.7–7.7)
NEUTROPHILS RELATIVE PERCENT: 73.8 %
PDW BLD-RTO: 14.3 % (ref 12.4–15.4)
PERFORMED ON: ABNORMAL
PERFORMED ON: NORMAL
PHOSPHORUS: 3.1 MG/DL (ref 2.5–4.9)
PLATELET # BLD: 840 K/UL (ref 135–450)
PMV BLD AUTO: 7.3 FL (ref 5–10.5)
POTASSIUM SERPL-SCNC: 4.5 MMOL/L (ref 3.5–5.1)
RBC # BLD: 3.84 M/UL (ref 4.2–5.9)
SODIUM BLD-SCNC: 136 MMOL/L (ref 136–145)
STREP PNEUMONIAE ANTIGEN, URINE: NORMAL
WBC # BLD: 8 K/UL (ref 4–11)

## 2021-11-26 PROCEDURE — 36415 COLL VENOUS BLD VENIPUNCTURE: CPT

## 2021-11-26 PROCEDURE — 6370000000 HC RX 637 (ALT 250 FOR IP): Performed by: NURSE PRACTITIONER

## 2021-11-26 PROCEDURE — 96372 THER/PROPH/DIAG INJ SC/IM: CPT

## 2021-11-26 PROCEDURE — 80069 RENAL FUNCTION PANEL: CPT

## 2021-11-26 PROCEDURE — 2580000003 HC RX 258: Performed by: INTERNAL MEDICINE

## 2021-11-26 PROCEDURE — 96376 TX/PRO/DX INJ SAME DRUG ADON: CPT

## 2021-11-26 PROCEDURE — 6360000002 HC RX W HCPCS: Performed by: INTERNAL MEDICINE

## 2021-11-26 PROCEDURE — 96366 THER/PROPH/DIAG IV INF ADDON: CPT

## 2021-11-26 PROCEDURE — 87449 NOS EACH ORGANISM AG IA: CPT

## 2021-11-26 PROCEDURE — 6360000002 HC RX W HCPCS: Performed by: NURSE PRACTITIONER

## 2021-11-26 PROCEDURE — G0378 HOSPITAL OBSERVATION PER HR: HCPCS

## 2021-11-26 PROCEDURE — 83735 ASSAY OF MAGNESIUM: CPT

## 2021-11-26 PROCEDURE — 86140 C-REACTIVE PROTEIN: CPT

## 2021-11-26 PROCEDURE — 85025 COMPLETE CBC W/AUTO DIFF WBC: CPT

## 2021-11-26 PROCEDURE — 6370000000 HC RX 637 (ALT 250 FOR IP): Performed by: INTERNAL MEDICINE

## 2021-11-26 PROCEDURE — 94640 AIRWAY INHALATION TREATMENT: CPT

## 2021-11-26 RX ORDER — ALBUTEROL SULFATE 90 UG/1
2 AEROSOL, METERED RESPIRATORY (INHALATION) EVERY 6 HOURS PRN
Qty: 1 EACH | Refills: 1 | Status: SHIPPED | OUTPATIENT
Start: 2021-11-26 | End: 2021-12-16 | Stop reason: SDUPTHER

## 2021-11-26 RX ADMIN — GUAIFENESIN AND DEXTROMETHORPHAN 5 ML: 100; 10 SYRUP ORAL at 10:16

## 2021-11-26 RX ADMIN — Medication 2 PUFF: at 09:50

## 2021-11-26 RX ADMIN — HYDROCODONE BITARTRATE AND ACETAMINOPHEN 1 TABLET: 5; 325 TABLET ORAL at 10:15

## 2021-11-26 RX ADMIN — SODIUM CHLORIDE, PRESERVATIVE FREE 10 ML: 5 INJECTION INTRAVENOUS at 10:16

## 2021-11-26 RX ADMIN — ENOXAPARIN SODIUM 40 MG: 100 INJECTION SUBCUTANEOUS at 10:15

## 2021-11-26 RX ADMIN — ATORVASTATIN CALCIUM 10 MG: 10 TABLET, FILM COATED ORAL at 10:15

## 2021-11-26 RX ADMIN — Medication 2 PUFF: at 13:07

## 2021-11-26 RX ADMIN — DEXAMETHASONE SODIUM PHOSPHATE 6 MG: 4 INJECTION, SOLUTION INTRAMUSCULAR; INTRAVENOUS at 10:15

## 2021-11-26 RX ADMIN — HYDROCODONE BITARTRATE AND ACETAMINOPHEN 1 TABLET: 5; 325 TABLET ORAL at 00:16

## 2021-11-26 RX ADMIN — NIFEDIPINE 60 MG: 30 TABLET, FILM COATED, EXTENDED RELEASE ORAL at 10:15

## 2021-11-26 ASSESSMENT — PAIN DESCRIPTION - PAIN TYPE: TYPE: ACUTE PAIN

## 2021-11-26 ASSESSMENT — PAIN DESCRIPTION - DESCRIPTORS: DESCRIPTORS: SORE

## 2021-11-26 ASSESSMENT — PAIN DESCRIPTION - LOCATION: LOCATION: CHEST

## 2021-11-26 ASSESSMENT — PAIN SCALES - GENERAL
PAINLEVEL_OUTOF10: 8
PAINLEVEL_OUTOF10: 0
PAINLEVEL_OUTOF10: 7

## 2021-11-26 NOTE — PLAN OF CARE
Discussed plan for d/c with patient, as did NISHANT Owusu CNP. Reviewed precautions for spreading infection, purpose of steroids, & availability of OTC medications that may ease symptoms. Went though AVS page by page, including time next medications due & reviewed purpose of all medications. Discussed general health maintenance & s/s of CVA. Pt d/c off unit w/ no further questions    Problem: Pain:  Description: Pain management should include both nonpharmacologic and pharmacologic interventions. Goal: Pain level will decrease  Description: Pain level will decrease  Outcome: Completed  Goal: Control of acute pain  Description: Control of acute pain  Outcome: Completed  Goal: Control of chronic pain  Description: Control of chronic pain  Outcome: Completed     Problem: Airway Clearance - Ineffective  Goal: Achieve or maintain patent airway  Outcome: Completed     Problem: Gas Exchange - Impaired  Goal: Absence of hypoxia  Outcome: Completed  Goal: Promote optimal lung function  Outcome: Completed     Problem: Breathing Pattern - Ineffective  Goal: Ability to achieve and maintain a regular respiratory rate  Outcome: Completed     Problem:  Body Temperature -  Risk of, Imbalanced  Goal: Ability to maintain a body temperature within defined limits  Outcome: Completed  Goal: Will regain or maintain usual level of consciousness  Outcome: Completed  Goal: Complications related to the disease process, condition or treatment will be avoided or minimized  Outcome: Completed     Problem: Isolation Precautions - Risk of Spread of Infection  Goal: Prevent transmission of infection  Outcome: Completed     Problem: Nutrition Deficits  Goal: Optimize nutritional status  Outcome: Completed     Problem: Risk for Fluid Volume Deficit  Goal: Maintain normal heart rhythm  Outcome: Completed  Goal: Maintain absence of muscle cramping  Outcome: Completed  Goal: Maintain normal serum potassium, sodium, calcium,

## 2021-11-26 NOTE — PLAN OF CARE
Discussed plan for d/c with patient, as did NISHANT Bland CNP. Reviewed precautions for spreading infection, purpose of steroids, & availability of OTC medications that may ease symptoms. Went though AVS page by page, including time next medications due & reviewed purpose of all medications. Discussed    Problem: Pain:  Description: Pain management should include both nonpharmacologic and pharmacologic interventions. Goal: Pain level will decrease  Description: Pain level will decrease  Outcome: Completed  Goal: Control of acute pain  Description: Control of acute pain  Outcome: Completed  Goal: Control of chronic pain  Description: Control of chronic pain  Outcome: Completed     Problem: Airway Clearance - Ineffective  Goal: Achieve or maintain patent airway  Outcome: Completed     Problem: Gas Exchange - Impaired  Goal: Absence of hypoxia  Outcome: Completed  Goal: Promote optimal lung function  Outcome: Completed     Problem: Breathing Pattern - Ineffective  Goal: Ability to achieve and maintain a regular respiratory rate  Outcome: Completed     Problem:  Body Temperature -  Risk of, Imbalanced  Goal: Ability to maintain a body temperature within defined limits  Outcome: Completed  Goal: Will regain or maintain usual level of consciousness  Outcome: Completed  Goal: Complications related to the disease process, condition or treatment will be avoided or minimized  Outcome: Completed     Problem: Isolation Precautions - Risk of Spread of Infection  Goal: Prevent transmission of infection  Outcome: Completed     Problem: Nutrition Deficits  Goal: Optimize nutritional status  Outcome: Completed     Problem: Risk for Fluid Volume Deficit  Goal: Maintain normal heart rhythm  Outcome: Completed  Goal: Maintain absence of muscle cramping  Outcome: Completed  Goal: Maintain normal serum potassium, sodium, calcium, phosphorus, and pH  Outcome: Completed     Problem: Loneliness or Risk for Loneliness  Goal: Demonstrate positive use of time alone when socialization is not possible  Outcome: Completed     Problem: Fatigue  Goal: Verbalize increase energy and improved vitality  Outcome: Completed     Problem: Patient Education: Go to Patient Education Activity  Goal: Patient/Family Education  Outcome: Completed

## 2021-11-26 NOTE — PROGRESS NOTES
4 Eyes Skin Assessment     NAME:  Sue Rivera  YOB: 1975  MEDICAL RECORD NUMBER:  [id number]    The patient is being assess for  Admission    I agree that 2 RN's have performed a thorough Head to Toe Skin Assessment on the patient. ALL assessment sites listed below have been assessed. Areas assessed by both nurses:    Head, Face, Ears, Shoulders, Back, Chest, Arms, Elbows, Hands, Sacrum. Buttock, Coccyx, Ischium and Legs. Feet and Heels        Does the Patient have a Wound?  No noted wound(s)       Robin Prevention initiated:  No   Wound Care Orders initiated:  No    Pressure Injury (Stage 3,4, Unstageable, DTI, NWPT, and Complex wounds) if present place consult order under [de-identified] No    New and Established Ostomies if present place consult order under : No      Nurse 1 eSignature: Electronically signed by Yoav Ramirez RN on 11/26/21 at 3:42 AM EST    **SHARE this note so that the co-signing nurse is able to place an eSignature**    Nurse 2 eSignature: Electronically signed by Casper Booth RN on 11/26/21 at 11:06 AM EST

## 2021-11-26 NOTE — CARE COORDINATION
Patient discharged 11/26/2021 to home   All discharge needs met per case management    Kathy Keating RN, BSN  [phone]

## 2021-11-26 NOTE — DISCHARGE SUMMARY
[facility]    Patient Demographics    Patient. Lindsay Horne  Date of Birth. 1975  MRN. [id number]     Primary care provider. John Paul Shin MD  (Tel: [phone])    Admit date: 11/25/2021    Discharge date 11/26/2021  Note Date: 11/26/2021     Reason for Hospitalization. Chief Complaint   Patient presents with    Shortness of Breath     pt son recently dx with covid and family isolated x14 days. \"whole family was sick\". pt is not getting any better. states having increase sob and cp.  Chest Pain         Significant Findings. Active Problems:    Pneumonia  Resolved Problems:    * No resolved hospital problems. *       Problems and results from this hospitalization that need follow up. 1. covid with weight loss/ malaise: Follow up with PCP    Significant test results and incidental findings. CT chest:  No scan evidence for pulmonary embolus. 2.  Commonly reported imaging features of COVID-19 pneumonia are present. Other processes such as influenza pneumonia and organizing pneumonia, as can   be seen with drug toxicity and connective tissue disease, can cause a similar   imaging pattern. PneTyp         Invasive procedures and treatments. [facility] Course. The patient sought care in the Ed due to fatigue and shortness of breath. He reported a 3 week history of nausea vomiting and diarrhea . His wife and 3 children were positive for covid,  He is not immunized and did not get any testing. He was admitted and rapid covid was Negative, however PCR was positive    Initially he was treated with antibiotics, however these were d/c when PCR was positive and pro calcitonin level was low. He was Never hypoxic and never required any supplemental oxygen. He did not have any nausea or vomiting during his stay. He was subsequently d/c home in stable condition. He needed much encouragement to get out of bed and sit in chair etc.     Consults. IP CONSULT TO HOSPITALIST    Physical examination on discharge day. /69   Pulse 100   Temp 97.5 °F (36.4 °C) (Temporal)   Resp 20   Ht 6' (1.829 m)   Wt 218 lb 0.6 oz (98.9 kg)   SpO2 96%   BMI 29.57 kg/m²   General appearance. Alert. Looks comfortable. HEENT. Sclera clear. Moist mucus membranes. Cardiovascular. Regular rate and rhythm, normal S1, S2. No murmur. Respiratory. Not using accessory muscles. Clear to auscultation bilaterally, no wheeze. Gastrointestinal. Abdomen soft, non-tender, not distended, normal bowel sounds  Neurology. Facial symmetry. No speech deficits. Moving all extremities equally. Extremities. No edema in lower extremities. Skin. Warm, dry, normal turgor    Condition at time of discharge stable     Medication instructions provided to patient at discharge. Medication List      CHANGE how you take these medications    albuterol sulfate  (90 Base) MCG/ACT inhaler  Commonly known as: Ventolin HFA  Inhale 2 puffs into the lungs every 6 hours as needed for Wheezing  What changed: when to take this        CONTINUE taking these medications    atorvastatin 10 MG tablet  Commonly known as: LIPITOR  Take 1 tablet by mouth daily  Notes to patient: Controls cholesterol      Blood Pressure Kit  1 kit by Does not apply route daily     clotrimazole-betamethasone 1-0.05 % cream  Commonly known as: Lotrisone  Apply topically 2 times daily.   Notes to patient: Anti-fungal ointment     NIFEdipine 60 MG extended release tablet  Commonly known as: ADALAT CC  TAKE 1 TABLET BY MOUTH EVERY DAY  Notes to patient: Controls Bp     terazosin 5 MG capsule  Commonly known as: HYTRIN  Take 1 capsule by mouth nightly  Notes to patient: Helps to initiate urine stream        STOP taking these medications    hydrALAZINE 25 MG tablet  Commonly known as: APRESOLINE     metoprolol tartrate 25 MG tablet  Commonly known as: LOPRESSOR           Where to Get Your Medications      These medications were sent to [address], [address]  [address], [facility]    Phone: [phone]   · albuterol sulfate  (90 Base) MCG/ACT inhaler         Discharge recommendations given to patient. Follow Up. in 1 week   Disposition. Home   Activity. As tolerated   Diet: ADULT DIET; Regular; 4 carb choices (60 gm/meal)      Spent > 30 minutes in discharge process.     Signed:  NISHANT Redding CNP     11/26/2021 12:10 PM

## 2021-11-28 LAB
INFLUENZA A BY PCR: NOT DETECTED
INFLUENZA B BY PCR: NOT DETECTED
RSV BY PCR: NOT DETECTED
RSV SOURCE: NORMAL

## 2021-11-29 ENCOUNTER — CARE COORDINATION (OUTPATIENT)
Dept: CASE MANAGEMENT | Age: 46
End: 2021-11-29

## 2021-11-29 DIAGNOSIS — B59 PNEUMONIA OF BOTH LUNGS DUE TO PNEUMOCYSTIS JIROVECII, UNSPECIFIED PART OF LUNG (HCC): Primary | ICD-10-CM

## 2021-11-29 LAB
BLOOD CULTURE, ROUTINE: NORMAL
CULTURE, BLOOD 2: NORMAL

## 2021-11-29 NOTE — CARE COORDINATION
Patient contacted regarding COVID-19 diagnosis. Discussed COVID-19 related testing which was available at this time. Test results were positive. Patient informed of results, if available? Patient aware of results. LPN Care Coordinator contacted the patient by telephone to perform post discharge assessment. Call within 2 business days of discharge: Yes. Verified name and  with patient as identifiers. Provided introduction to self, and explanation of the CTN/ACM role, and reason for call due to risk factors for infection and/or exposure to COVID-19. Symptoms reviewed with patient who verbalized the following symptoms: cough, loss of taste or smell, chills or shaking, chest pain and no new symptoms. Due to frequent cough encounter was routed to provider for escalation. Discussed follow-up appointments. If no appointment was previously scheduled, appointment scheduling offered: Yes. [facility] follow up appointment(s):   Future Appointments   Date Time Provider Tamera Pisano   2022  9:30 AM Konrad Frances MD [facility] - DYD     Non-[facility] follow up appointment(s):     Non-face-to-face services provided:  Obtained and reviewed discharge summary and/or continuity of care documents     Advance Care Planning:   Does patient have an Advance Directive:  not on file. Educated patient about risk for severe COVID-19 due to risk factors according to CDC guidelines. LPN CC reviewed discharge instructions, medical action plan and red flag symptoms with the patient who verbalized understanding. Discussed COVID vaccination status: Yes. Education provided on COVID-19 vaccination as appropriate. Discussed exposure protocols and quarantine with CDC Guidelines. Patient was given an opportunity to verbalize any questions and concerns and agrees to contact LPN CC or health care provider for questions related to their healthcare.     Reviewed and educated patient on any new and changed medications related to discharge diagnosis     Was patient discharged with a pulse oximeter? No Discussed and confirmed pulse oximeter discharge instructions and when to notify provider or seek emergency care. Patient reports he is feeling bad. He has a cough that has been keeping him awake. He has been taking Robitussin but it isn't helping. He denies fever,dyspnea, palpitations, abdominal pain, or weakness. He has  cough, chills, sob and fatigue. His b/p has been solis,l last one was 110/87. Appetite and fluid intake is good. Urinating good. Has constipation. Suggested for patient to use a stool softener or prune juice to help. Patient hasn't been Covid 19 vaccinated. Patient doesn't have f/u with PCP. Medication review and 1111f done. No questions at this time. Will forward to PCP. LPN CC provided contact information. Plan for follow-up call in 3-5 days based on severity of symptoms and risk factors.

## 2021-11-30 ENCOUNTER — APPOINTMENT (OUTPATIENT)
Dept: GENERAL RADIOLOGY | Age: 46
End: 2021-11-30
Payer: COMMERCIAL

## 2021-11-30 ENCOUNTER — HOSPITAL ENCOUNTER (OUTPATIENT)
Age: 46
Setting detail: OBSERVATION
Discharge: HOME | End: 2021-12-01
Attending: EMERGENCY MEDICINE | Admitting: HOSPITALIST
Payer: COMMERCIAL

## 2021-11-30 ENCOUNTER — TELEPHONE (OUTPATIENT)
Dept: OTHER | Facility: CLINIC | Age: 46
End: 2021-11-30

## 2021-11-30 ENCOUNTER — TELEPHONE (OUTPATIENT)
Dept: INTERNAL MEDICINE CLINIC | Age: 46
End: 2021-11-30

## 2021-11-30 ENCOUNTER — APPOINTMENT (OUTPATIENT)
Dept: CT IMAGING | Age: 46
End: 2021-11-30
Payer: COMMERCIAL

## 2021-11-30 DIAGNOSIS — J12.82 PNEUMONIA DUE TO COVID-19 VIRUS: Primary | ICD-10-CM

## 2021-11-30 DIAGNOSIS — U07.1 COVID-19: ICD-10-CM

## 2021-11-30 DIAGNOSIS — J45.901 ASTHMA WITH ACUTE EXACERBATION, UNSPECIFIED ASTHMA SEVERITY, UNSPECIFIED WHETHER PERSISTENT: ICD-10-CM

## 2021-11-30 DIAGNOSIS — U07.1 PNEUMONIA DUE TO COVID-19 VIRUS: Primary | ICD-10-CM

## 2021-11-30 DIAGNOSIS — R09.02 HYPOXIA: ICD-10-CM

## 2021-11-30 LAB
A/G RATIO: 0.9 (ref 1.1–2.2)
ALBUMIN SERPL-MCNC: 3.3 G/DL (ref 3.4–5)
ALP BLD-CCNC: 90 U/L (ref 40–129)
ALT SERPL-CCNC: 57 U/L (ref 10–40)
ANION GAP SERPL CALCULATED.3IONS-SCNC: 13 MMOL/L (ref 3–16)
AST SERPL-CCNC: 27 U/L (ref 15–37)
BASOPHILS ABSOLUTE: 0.1 K/UL (ref 0–0.2)
BASOPHILS RELATIVE PERCENT: 0.9 %
BILIRUB SERPL-MCNC: 0.3 MG/DL (ref 0–1)
BUN BLDV-MCNC: 18 MG/DL (ref 7–20)
C-REACTIVE PROTEIN: 5.9 MG/L (ref 0–5.1)
CALCIUM SERPL-MCNC: 8.9 MG/DL (ref 8.3–10.6)
CHLORIDE BLD-SCNC: 105 MMOL/L (ref 99–110)
CO2: 21 MMOL/L (ref 21–32)
CREAT SERPL-MCNC: 1.2 MG/DL (ref 0.9–1.3)
D DIMER: 439 NG/ML DDU (ref 0–229)
EOSINOPHILS ABSOLUTE: 0 K/UL (ref 0–0.6)
EOSINOPHILS RELATIVE PERCENT: 0.5 %
GFR AFRICAN AMERICAN: >60
GFR NON-AFRICAN AMERICAN: >60
GLUCOSE BLD-MCNC: 98 MG/DL (ref 70–99)
HCT VFR BLD CALC: 36.2 % (ref 40.5–52.5)
HEMOGLOBIN: 12 G/DL (ref 13.5–17.5)
LACTIC ACID: 1 MMOL/L (ref 0.4–2)
LYMPHOCYTES ABSOLUTE: 1.6 K/UL (ref 1–5.1)
LYMPHOCYTES RELATIVE PERCENT: 26 %
MCH RBC QN AUTO: 29.2 PG (ref 26–34)
MCHC RBC AUTO-ENTMCNC: 33.2 G/DL (ref 31–36)
MCV RBC AUTO: 88 FL (ref 80–100)
MONOCYTES ABSOLUTE: 1 K/UL (ref 0–1.3)
MONOCYTES RELATIVE PERCENT: 15.1 %
NEUTROPHILS ABSOLUTE: 3.6 K/UL (ref 1.7–7.7)
NEUTROPHILS RELATIVE PERCENT: 57.5 %
PDW BLD-RTO: 14.8 % (ref 12.4–15.4)
PLATELET # BLD: 651 K/UL (ref 135–450)
PMV BLD AUTO: 7.4 FL (ref 5–10.5)
POTASSIUM SERPL-SCNC: 3.7 MMOL/L (ref 3.5–5.1)
PRO-BNP: 80 PG/ML (ref 0–124)
PROCALCITONIN: 0.05 NG/ML (ref 0–0.15)
RBC # BLD: 4.11 M/UL (ref 4.2–5.9)
SODIUM BLD-SCNC: 139 MMOL/L (ref 136–145)
TOTAL PROTEIN: 6.8 G/DL (ref 6.4–8.2)
TROPONIN: <0.01 NG/ML
WBC # BLD: 6.3 K/UL (ref 4–11)

## 2021-11-30 PROCEDURE — 94640 AIRWAY INHALATION TREATMENT: CPT

## 2021-11-30 PROCEDURE — 84145 PROCALCITONIN (PCT): CPT

## 2021-11-30 PROCEDURE — 84484 ASSAY OF TROPONIN QUANT: CPT

## 2021-11-30 PROCEDURE — 93005 ELECTROCARDIOGRAM TRACING: CPT | Performed by: PHYSICIAN ASSISTANT

## 2021-11-30 PROCEDURE — 71260 CT THORAX DX C+: CPT

## 2021-11-30 PROCEDURE — 96375 TX/PRO/DX INJ NEW DRUG ADDON: CPT

## 2021-11-30 PROCEDURE — 85025 COMPLETE CBC W/AUTO DIFF WBC: CPT

## 2021-11-30 PROCEDURE — 71045 X-RAY EXAM CHEST 1 VIEW: CPT

## 2021-11-30 PROCEDURE — 96372 THER/PROPH/DIAG INJ SC/IM: CPT

## 2021-11-30 PROCEDURE — 96374 THER/PROPH/DIAG INJ IV PUSH: CPT

## 2021-11-30 PROCEDURE — 87040 BLOOD CULTURE FOR BACTERIA: CPT

## 2021-11-30 PROCEDURE — 85379 FIBRIN DEGRADATION QUANT: CPT

## 2021-11-30 PROCEDURE — 99282 EMERGENCY DEPT VISIT SF MDM: CPT

## 2021-11-30 PROCEDURE — G0378 HOSPITAL OBSERVATION PER HR: HCPCS

## 2021-11-30 PROCEDURE — 6370000000 HC RX 637 (ALT 250 FOR IP): Performed by: HOSPITALIST

## 2021-11-30 PROCEDURE — 6360000002 HC RX W HCPCS: Performed by: PHYSICIAN ASSISTANT

## 2021-11-30 PROCEDURE — 83605 ASSAY OF LACTIC ACID: CPT

## 2021-11-30 PROCEDURE — 94760 N-INVAS EAR/PLS OXIMETRY 1: CPT

## 2021-11-30 PROCEDURE — 80053 COMPREHEN METABOLIC PANEL: CPT

## 2021-11-30 PROCEDURE — 6360000002 HC RX W HCPCS: Performed by: EMERGENCY MEDICINE

## 2021-11-30 PROCEDURE — 83880 ASSAY OF NATRIURETIC PEPTIDE: CPT

## 2021-11-30 PROCEDURE — 6370000000 HC RX 637 (ALT 250 FOR IP): Performed by: PHYSICIAN ASSISTANT

## 2021-11-30 PROCEDURE — 86140 C-REACTIVE PROTEIN: CPT

## 2021-11-30 PROCEDURE — 6360000004 HC RX CONTRAST MEDICATION: Performed by: PHYSICIAN ASSISTANT

## 2021-11-30 RX ORDER — GUAIFENESIN/DEXTROMETHORPHAN 100-10MG/5
5 SYRUP ORAL EVERY 4 HOURS PRN
Status: DISCONTINUED | OUTPATIENT
Start: 2021-11-30 | End: 2021-12-01 | Stop reason: HOSPADM

## 2021-11-30 RX ORDER — DEXAMETHASONE 4 MG/1
6 TABLET ORAL DAILY
Status: DISCONTINUED | OUTPATIENT
Start: 2021-12-01 | End: 2021-11-30 | Stop reason: ALTCHOICE

## 2021-11-30 RX ORDER — MAGNESIUM SULFATE IN WATER 40 MG/ML
2000 INJECTION, SOLUTION INTRAVENOUS PRN
Status: DISCONTINUED | OUTPATIENT
Start: 2021-11-30 | End: 2021-12-01 | Stop reason: HOSPADM

## 2021-11-30 RX ORDER — ASCORBIC ACID 500 MG
500 TABLET ORAL 2 TIMES DAILY
Status: DISCONTINUED | OUTPATIENT
Start: 2021-11-30 | End: 2021-12-01 | Stop reason: HOSPADM

## 2021-11-30 RX ORDER — POTASSIUM CHLORIDE 7.45 MG/ML
10 INJECTION INTRAVENOUS PRN
Status: DISCONTINUED | OUTPATIENT
Start: 2021-11-30 | End: 2021-12-01 | Stop reason: HOSPADM

## 2021-11-30 RX ORDER — POLYETHYLENE GLYCOL 3350 17 G/17G
17 POWDER, FOR SOLUTION ORAL DAILY PRN
Status: DISCONTINUED | OUTPATIENT
Start: 2021-11-30 | End: 2021-12-01 | Stop reason: HOSPADM

## 2021-11-30 RX ORDER — MAGNESIUM HYDROXIDE/ALUMINUM HYDROXICE/SIMETHICONE 120; 1200; 1200 MG/30ML; MG/30ML; MG/30ML
30 SUSPENSION ORAL EVERY 6 HOURS PRN
Status: DISCONTINUED | OUTPATIENT
Start: 2021-11-30 | End: 2021-12-01 | Stop reason: HOSPADM

## 2021-11-30 RX ORDER — NIFEDIPINE 30 MG/1
60 TABLET, EXTENDED RELEASE ORAL DAILY
Status: DISCONTINUED | OUTPATIENT
Start: 2021-12-01 | End: 2021-12-01 | Stop reason: HOSPADM

## 2021-11-30 RX ORDER — DEXAMETHASONE SODIUM PHOSPHATE 10 MG/ML
6 INJECTION, SOLUTION INTRAMUSCULAR; INTRAVENOUS ONCE
Status: COMPLETED | OUTPATIENT
Start: 2021-11-30 | End: 2021-11-30

## 2021-11-30 RX ORDER — DEXAMETHASONE SODIUM PHOSPHATE 10 MG/ML
6 INJECTION, SOLUTION INTRAMUSCULAR; INTRAVENOUS EVERY 24 HOURS
Status: DISCONTINUED | OUTPATIENT
Start: 2021-12-01 | End: 2021-12-01 | Stop reason: HOSPADM

## 2021-11-30 RX ORDER — ONDANSETRON 2 MG/ML
4 INJECTION INTRAMUSCULAR; INTRAVENOUS EVERY 6 HOURS PRN
Status: DISCONTINUED | OUTPATIENT
Start: 2021-11-30 | End: 2021-12-01 | Stop reason: HOSPADM

## 2021-11-30 RX ORDER — SODIUM CHLORIDE 0.9 % (FLUSH) 0.9 %
5-40 SYRINGE (ML) INJECTION EVERY 12 HOURS SCHEDULED
Status: DISCONTINUED | OUTPATIENT
Start: 2021-11-30 | End: 2021-12-01 | Stop reason: HOSPADM

## 2021-11-30 RX ORDER — ONDANSETRON 4 MG/1
4 TABLET, ORALLY DISINTEGRATING ORAL EVERY 8 HOURS PRN
Status: DISCONTINUED | OUTPATIENT
Start: 2021-11-30 | End: 2021-12-01 | Stop reason: HOSPADM

## 2021-11-30 RX ORDER — ALBUTEROL SULFATE 90 UG/1
2 AEROSOL, METERED RESPIRATORY (INHALATION) EVERY 6 HOURS PRN
Status: DISCONTINUED | OUTPATIENT
Start: 2021-11-30 | End: 2021-12-01 | Stop reason: HOSPADM

## 2021-11-30 RX ORDER — POTASSIUM CHLORIDE 20 MEQ/1
40 TABLET, EXTENDED RELEASE ORAL PRN
Status: DISCONTINUED | OUTPATIENT
Start: 2021-11-30 | End: 2021-12-01 | Stop reason: HOSPADM

## 2021-11-30 RX ORDER — BENZONATATE 100 MG/1
100 CAPSULE ORAL 3 TIMES DAILY PRN
Status: DISCONTINUED | OUTPATIENT
Start: 2021-11-30 | End: 2021-12-01 | Stop reason: HOSPADM

## 2021-11-30 RX ORDER — ATORVASTATIN CALCIUM 10 MG/1
10 TABLET, FILM COATED ORAL NIGHTLY
Status: DISCONTINUED | OUTPATIENT
Start: 2021-11-30 | End: 2021-12-01 | Stop reason: HOSPADM

## 2021-11-30 RX ORDER — ZINC SULFATE 50(220)MG
50 CAPSULE ORAL DAILY
Status: DISCONTINUED | OUTPATIENT
Start: 2021-12-01 | End: 2021-12-01 | Stop reason: HOSPADM

## 2021-11-30 RX ORDER — SODIUM CHLORIDE 9 MG/ML
25 INJECTION, SOLUTION INTRAVENOUS PRN
Status: DISCONTINUED | OUTPATIENT
Start: 2021-11-30 | End: 2021-12-01 | Stop reason: HOSPADM

## 2021-11-30 RX ORDER — ACETAMINOPHEN 650 MG/1
650 SUPPOSITORY RECTAL EVERY 6 HOURS PRN
Status: DISCONTINUED | OUTPATIENT
Start: 2021-11-30 | End: 2021-12-01 | Stop reason: HOSPADM

## 2021-11-30 RX ORDER — SODIUM CHLORIDE 0.9 % (FLUSH) 0.9 %
5-40 SYRINGE (ML) INJECTION PRN
Status: DISCONTINUED | OUTPATIENT
Start: 2021-11-30 | End: 2021-12-01 | Stop reason: HOSPADM

## 2021-11-30 RX ORDER — VITAMIN B COMPLEX
2000 TABLET ORAL DAILY
Status: DISCONTINUED | OUTPATIENT
Start: 2021-12-01 | End: 2021-12-01 | Stop reason: HOSPADM

## 2021-11-30 RX ORDER — IPRATROPIUM BROMIDE AND ALBUTEROL SULFATE 2.5; .5 MG/3ML; MG/3ML
1 SOLUTION RESPIRATORY (INHALATION) ONCE
Status: COMPLETED | OUTPATIENT
Start: 2021-11-30 | End: 2021-11-30

## 2021-11-30 RX ORDER — METHYLPREDNISOLONE SODIUM SUCCINATE 125 MG/2ML
125 INJECTION, POWDER, LYOPHILIZED, FOR SOLUTION INTRAMUSCULAR; INTRAVENOUS ONCE
Status: COMPLETED | OUTPATIENT
Start: 2021-11-30 | End: 2021-11-30

## 2021-11-30 RX ORDER — ACETAMINOPHEN 325 MG/1
650 TABLET ORAL EVERY 6 HOURS PRN
Status: DISCONTINUED | OUTPATIENT
Start: 2021-11-30 | End: 2021-12-01 | Stop reason: HOSPADM

## 2021-11-30 RX ORDER — TERAZOSIN 5 MG/1
5 CAPSULE ORAL NIGHTLY
Status: DISCONTINUED | OUTPATIENT
Start: 2021-11-30 | End: 2021-11-30 | Stop reason: CLARIF

## 2021-11-30 RX ORDER — DOXAZOSIN MESYLATE 1 MG/1
4 TABLET ORAL NIGHTLY
Status: DISCONTINUED | OUTPATIENT
Start: 2021-11-30 | End: 2021-12-01 | Stop reason: HOSPADM

## 2021-11-30 RX ADMIN — DEXAMETHASONE SODIUM PHOSPHATE 6 MG: 10 INJECTION, SOLUTION INTRAMUSCULAR; INTRAVENOUS at 21:11

## 2021-11-30 RX ADMIN — IOPAMIDOL 75 ML: 755 INJECTION, SOLUTION INTRAVENOUS at 17:31

## 2021-11-30 RX ADMIN — METHYLPREDNISOLONE SODIUM SUCCINATE 125 MG: 125 INJECTION, POWDER, FOR SOLUTION INTRAMUSCULAR; INTRAVENOUS at 21:10

## 2021-11-30 RX ADMIN — IPRATROPIUM BROMIDE AND ALBUTEROL SULFATE 1 AMPULE: .5; 3 SOLUTION RESPIRATORY (INHALATION) at 16:07

## 2021-11-30 RX ADMIN — ENOXAPARIN SODIUM 40 MG: 40 INJECTION SUBCUTANEOUS at 21:11

## 2021-11-30 ASSESSMENT — PAIN SCALES - GENERAL: PAINLEVEL_OUTOF10: 7

## 2021-11-30 ASSESSMENT — ENCOUNTER SYMPTOMS
DIARRHEA: 0
VOMITING: 0
WHEEZING: 1
NAUSEA: 0
STRIDOR: 0
SHORTNESS OF BREATH: 1
COUGH: 1
BACK PAIN: 1
ABDOMINAL PAIN: 0
CONSTIPATION: 0
COLOR CHANGE: 0

## 2021-11-30 ASSESSMENT — PAIN DESCRIPTION - LOCATION: LOCATION: CHEST

## 2021-11-30 ASSESSMENT — PAIN DESCRIPTION - PAIN TYPE: TYPE: ACUTE PAIN

## 2021-11-30 NOTE — TELEPHONE ENCOUNTER
Patient released from hospital 3 days ago, he has covid. He has virtual visit scheduled with Danni Maxwell tomorrow at 1040 am.  He states he is having a hard time breathing and he needs oxygen. Advised him of appointment tomorrow and he states he doesn't know if he can make it until then without oxygen. Patient also requested what sounds like some type of nebulizer treatment. He states he was prescribed this when he had bronchitis 2 years ago. He described a pill he dropped in nebulizer machine that would dissolve in hot water and he inhaled the vapors. Patient states he has the machine but is unable to tell me the name of the medication. Patient again states he is having difficulty breathing.

## 2021-11-30 NOTE — TELEPHONE ENCOUNTER
Called pt and advised he should go to ER. Stated he just wants something for breathing. Doesn't want to go to ER.  SG

## 2021-11-30 NOTE — ED PROVIDER NOTES
[facility]        Pt Name: Luis Marquez  MRN: [id number]  [place] 1975  Date of evaluation: 11/30/2021  Provider: Vic Warner PA-C  PCP: Yovany Brady MD  Note Started: 3:51 PM EST        I have seen and evaluated this patient with my supervising physician Keysha Aceves MD.    CHIEF COMPLAINT       Chief Complaint   Patient presents with    Shortness of Breath     PT arrived with c/o SOB/asthma recently COVID+       HISTORY OF PRESENT ILLNESS   (Location, Timing/Onset, Context/Setting, Quality, Duration, Modifying Factors, Severity, Associated Signs and Symptoms)  Note limiting factors. Chief Complaint: Shortness of breath and wheezing starting this morning    Luis Marquez is a 48 y.o. male who presents to the emergency department complaining of shortness of breath, wheezing and back pain. He was recently admitted to the hospital on 11/25/2021 for Covid pneumonia. He is not currently on any antibiotics. He had used his inhaler prior to arrival.  He appears uncomfortable and leaning forward to breathe. Denies hemoptysis, pain or swelling in extremities, fever or chills. He reports discomfort in his back. Nursing Notes were all reviewed and agreed with or any disagreements were addressed in the HPI. REVIEW OF SYSTEMS    (2-9 systems for level 4, 10 or more for level 5)     Review of Systems   Constitutional: Positive for fatigue. Negative for chills and fever. HENT: Negative. Eyes: Negative for visual disturbance. Respiratory: Positive for cough, shortness of breath and wheezing. Negative for stridor. Cardiovascular: Negative for chest pain, palpitations and leg swelling. Gastrointestinal: Negative for abdominal pain, constipation, diarrhea, nausea and vomiting. Genitourinary: Negative. Musculoskeletal: Positive for back pain. Negative for neck pain and neck stiffness.    Skin: Negative for color change, pallor, rash and wound. Neurological: Negative for dizziness, tremors, seizures, syncope, facial asymmetry, speech difficulty, weakness, light-headedness, numbness and headaches. Psychiatric/Behavioral: Negative for confusion. All other systems reviewed and are negative. Positives and Pertinent negatives as per HPI. Except as noted above in the ROS, all other systems were reviewed and negative. PAST MEDICAL HISTORY     Past Medical History:   Diagnosis Date    Asthma     BPH associated with nocturia 8/9/2021    Hypertension     Mixed hyperlipidemia 8/9/2021    Obesity (BMI 30.0-34. 9) 8/9/2021    Prediabetes 10/20/2021         SURGICAL HISTORY   History reviewed. No pertinent surgical history. CURRENTMEDICATIONS       Previous Medications    ALBUTEROL SULFATE HFA (VENTOLIN HFA) 108 (90 BASE) MCG/ACT INHALER    Inhale 2 puffs into the lungs every 6 hours as needed for Wheezing    ATORVASTATIN (LIPITOR) 10 MG TABLET    Take 1 tablet by mouth daily    BLOOD PRESSURE KIT    1 kit by Does not apply route daily    CLOTRIMAZOLE-BETAMETHASONE (LOTRISONE) 1-0.05 % CREAM    Apply topically 2 times daily. NIFEDIPINE (ADALAT CC) 60 MG EXTENDED RELEASE TABLET    TAKE 1 TABLET BY MOUTH EVERY DAY    TERAZOSIN (HYTRIN) 5 MG CAPSULE    Take 1 capsule by mouth nightly         ALLERGIES     Codeine, Lisinopril, and Other    FAMILYHISTORY     History reviewed. No pertinent family history. SOCIAL HISTORY       Social History     Tobacco Use    Smoking status: Never Smoker    Smokeless tobacco: Never Used   Vaping Use    Vaping Use: Never used   Substance Use Topics    Alcohol use: No    Drug use: No       SCREENINGS             PHYSICAL EXAM    (up to 7 for level 4, 8 or more for level 5)     ED Triage Vitals [11/30/21 1547]   BP Temp Temp src Pulse Resp SpO2 Height Weight   (!) 171/116 -- -- 116 24 97 % -- --       Physical Exam  Vitals and nursing note reviewed.    Constitutional: Appearance: Normal appearance. He is well-developed. He is not toxic-appearing or diaphoretic. HENT:      Head: Normocephalic and atraumatic. Right Ear: External ear normal.      Left Ear: External ear normal.      Nose: Nose normal.      Mouth/Throat:      Mouth: Mucous membranes are moist.      Pharynx: Oropharynx is clear. Eyes:      General: No scleral icterus. Right eye: No discharge. Left eye: No discharge. Extraocular Movements: Extraocular movements intact. Conjunctiva/sclera: Conjunctivae normal.      Pupils: Pupils are equal, round, and reactive to light. Cardiovascular:      Rate and Rhythm: Tachycardia present. Pulmonary:      Effort: Pulmonary effort is normal.      Breath sounds: No stridor. Wheezing (diffusely scattered bilateral) present. No rhonchi. Abdominal:      General: Bowel sounds are normal.      Palpations: Abdomen is soft. Tenderness: There is no abdominal tenderness. Musculoskeletal:         General: Normal range of motion. Cervical back: Normal range of motion. Comments: No extremity edema, posterior calf or thigh tenderness, palpable cord, discoloration. Negative homans. Skin:     General: Skin is warm and dry. Capillary Refill: Capillary refill takes less than 2 seconds. Coloration: Skin is not jaundiced or pale. Findings: No bruising, erythema, lesion or rash. Neurological:      Mental Status: He is alert and oriented to person, place, and time. Mental status is at baseline. Psychiatric:         Attention and Perception: Attention and perception normal.         Mood and Affect: Mood is anxious. Speech: Speech normal.         Behavior: Behavior normal. Behavior is cooperative. Thought Content:  Thought content normal.         Cognition and Memory: Cognition and memory normal.         Judgment: Judgment normal.         DIAGNOSTIC RESULTS   LABS:    Labs Reviewed   CBC WITH AUTO DIFFERENTIAL - Abnormal; Notable for the following components:       Result Value    RBC 4.11 (*)     Hemoglobin 12.0 (*)     Hematocrit 36.2 (*)     Platelets 615 (*)     All other components within normal limits    Narrative:     Performed at:  [facility] [address], [address]   Phone [phone]   COMPREHENSIVE METABOLIC PANEL - Abnormal; Notable for the following components:    Albumin 3.3 (*)     Albumin/Globulin Ratio 0.9 (*)     ALT 57 (*)     All other components within normal limits    Narrative:     Performed at:  [facility] [address], [address]   Phone [phone]   D-DIMER, QUANTITATIVE - Abnormal; Notable for the following components:    D-Dimer, Quant 439 (*)     All other components within normal limits    Narrative:     Performed at:  [facility] [address], [place]   Phone [phone]   CULTURE, BLOOD 2   CULTURE, BLOOD 1   TROPONIN    Narrative:     Performed at:  [facility] [address], [address]   Phone [phone]   BRAIN NATRIURETIC PEPTIDE    Narrative:     Performed at:  [facility] [address], [address]   Phone [phone]   LACTIC ACID, PLASMA    Narrative:     Performed at:  [facility] [address], [place]   Phone [phone]   PROCALCITONIN    Narrative:     Performed at:  [facility] [address], [place]   Phone [phone]   LACTIC ACID, PLASMA       When ordered only abnormal lab results are displayed. All other labs were within normal range or not returned as of this dictation. EKG:  When ordered, EKG's are interpreted by the Emergency Department Physician in the absence of a cardiologist.  Please see their note for interpretation of EKG. RADIOLOGY:   Non-plain film images such as CT, Ultrasound and MRI are read by the radiologist. Plain radiographic images are visualized and preliminarily interpreted by the ED Provider with the below findings:        Interpretation per the Radiologist below, if available at the time of this note:    XR CHEST PORTABLE   Final Result   Similar appearance of subpleural, basilar predominant airspace opacities   highly suggestive of COVID-19 pneumonia given reported infection. CT CHEST PULMONARY EMBOLISM W CONTRAST    (Results Pending)     XR CHEST PORTABLE    Result Date: 11/25/2021  EXAMINATION: ONE XRAY VIEW OF THE CHEST 11/25/2021 2:57 am COMPARISON: 06/20/2021 HISTORY: ORDERING SYSTEM PROVIDED HISTORY: SOB TECHNOLOGIST PROVIDED HISTORY: Reason for exam:->SOB FINDINGS: There are poorly defined airspace opacities bilaterally. The heart size is normal.  The costophrenic angles are sharp. There is no discernible pneumothorax. Commonly reported imaging features of COVID-19 pneumonia are present. Other processes such as influenza pneumonia and organizing pneumonia, as can be seen with drug toxicity and connective tissue disease, can cause a similar imaging pattern. PneTyp     CT CHEST PULMONARY EMBOLISM W CONTRAST    Result Date: 11/25/2021  EXAMINATION: CTA OF THE CHEST 11/25/2021 4:02 am TECHNIQUE: CTA of the chest was performed after the administration of intravenous contrast.  Multiplanar reformatted images are provided for review. MIP images are provided for review. Dose modulation, iterative reconstruction, and/or weight based adjustment of the mA/kV was utilized to reduce the radiation dose to as low as reasonably achievable.  COMPARISON: 09/26/2019 HISTORY: ORDERING SYSTEM PROVIDED HISTORY: possible covid, questionable concommitant PE TECHNOLOGIST PROVIDED HISTORY: Reason for exam:->possible covid, questionable concommitant PE Decision Support Exception - unselect if not a suspected or confirmed emergency medical condition->Emergency Medical Condition (MA) Reason for Exam: possible covid, questionable concommitant PE Acuity: Acute Type of Exam: Initial Relevant Medical/Surgical History: Shortness of Breath (pt son recently dx with covid and family isolated x14 days. \"whole family was sick\". pt is not getting any better. states having increase sob and cp. ) FINDINGS: Pulmonary Arteries: Pulmonary arteries are adequately opacified for evaluation. No evidence of intraluminal filling defect to suggest pulmonary embolism. Main pulmonary artery is normal in caliber. Mediastinum: Mediastinal and bilateral hilar lymph nodes are likely reactive. The heart and pericardium demonstrate no acute abnormality. There is no acute abnormality of the thoracic aorta. Lungs/pleura: There are patchy predominantly peripheral ground-glass opacities throughout both lungs. There is no pneumothorax or pleural effusion. The central airways are patent. Upper Abdomen: Limited images of the upper abdomen are unremarkable. Soft Tissues/Bones: No acute bone or soft tissue abnormality. 1. No scan evidence for pulmonary embolus. 2.  Commonly reported imaging features of COVID-19 pneumonia are present. Other processes such as influenza pneumonia and organizing pneumonia, as can be seen with drug toxicity and connective tissue disease, can cause a similar imaging pattern.  PneTyp           PROCEDURES   Unless otherwise noted below, none     Procedures    CRITICAL CARE TIME   N/A    CONSULTS:  None      EMERGENCY DEPARTMENT COURSE and DIFFERENTIAL DIAGNOSIS/MDM:   Vitals:    Vitals:    11/30/21 1607 11/30/21 1615 11/30/21 1622 11/30/21 1623   BP:       Pulse:   106    Resp: 24      Temp:    98.8 °F (37.1 °C)   TempSrc:    Oral   SpO2:  94% 95%        Patient was given the following medications:  Medications   methylPREDNISolone sodium (SOLU-MEDROL) injection 125 mg (has no administration in time range) ipratropium-albuterol (DUONEB) nebulizer solution 1 ampule (1 ampule Inhalation Given 11/30/21 1607)   iopamidol (ISOVUE-370) 76 % injection 75 mL (75 mLs IntraVENous Given 11/30/21 1730)         This patient presents to the emergency department complaining of shortness of breath. He did receive breathing treatment shortly after arrival.  D-dimer is slightly elevated, therefore we will be obtaining a CT scan of the chest to rule out PE, especially given his recent admission and recent Covid pneumonia diagnosis. Chest x-ray appears consistent with Covid pneumonia. Oxygen saturation is stable at this time. As this is the end of my shift, my attending will be taking over further care, treatment, disposition. See his note for pending studies and plan of care. FINAL IMPRESSION      1. Pneumonia due to COVID-19 virus    2. Asthma with acute exacerbation, unspecified asthma severity, unspecified whether persistent          DISPOSITION/PLAN   DISPOSITION  See attending note      PATIENT REFERRED TO:  No follow-up provider specified.     DISCHARGE MEDICATIONS:  New Prescriptions    No medications on file       DISCONTINUED MEDICATIONS:  Discontinued Medications    No medications on file              (Please note that portions of this note were completed with a voice recognition program.  Efforts were made to edit the dictations but occasionally words are mis-transcribed.)    Sebastian Diop PA-C (electronically signed)           Sebastian Diop PA-C  11/30/21 1710

## 2021-11-30 NOTE — TELEPHONE ENCOUNTER
Writer contacted Dr. Florentin May to inform of 30 day readmission risk. Dr. Florentin May informed writer of readmission.

## 2021-12-01 VITALS
SYSTOLIC BLOOD PRESSURE: 144 MMHG | WEIGHT: 218 LBS | HEART RATE: 95 BPM | DIASTOLIC BLOOD PRESSURE: 96 MMHG | BODY MASS INDEX: 29.53 KG/M2 | HEIGHT: 72 IN | TEMPERATURE: 97.8 F | OXYGEN SATURATION: 95 % | RESPIRATION RATE: 20 BRPM

## 2021-12-01 LAB
A/G RATIO: 0.9 (ref 1.1–2.2)
ALBUMIN SERPL-MCNC: 3.4 G/DL (ref 3.4–5)
ALP BLD-CCNC: 80 U/L (ref 40–129)
ALT SERPL-CCNC: 53 U/L (ref 10–40)
ANION GAP SERPL CALCULATED.3IONS-SCNC: 12 MMOL/L (ref 3–16)
AST SERPL-CCNC: 25 U/L (ref 15–37)
BASOPHILS ABSOLUTE: 0 K/UL (ref 0–0.2)
BASOPHILS RELATIVE PERCENT: 0.3 %
BILIRUB SERPL-MCNC: 0.3 MG/DL (ref 0–1)
BUN BLDV-MCNC: 15 MG/DL (ref 7–20)
C-REACTIVE PROTEIN: 5.4 MG/L (ref 0–5.1)
CALCIUM SERPL-MCNC: 8.9 MG/DL (ref 8.3–10.6)
CHLORIDE BLD-SCNC: 105 MMOL/L (ref 99–110)
CO2: 20 MMOL/L (ref 21–32)
CREAT SERPL-MCNC: 1 MG/DL (ref 0.9–1.3)
D DIMER: 302 NG/ML DDU (ref 0–229)
EKG ATRIAL RATE: 106 BPM
EKG DIAGNOSIS: NORMAL
EKG P AXIS: 32 DEGREES
EKG P-R INTERVAL: 152 MS
EKG Q-T INTERVAL: 326 MS
EKG QRS DURATION: 90 MS
EKG QTC CALCULATION (BAZETT): 433 MS
EKG R AXIS: 28 DEGREES
EKG T AXIS: 36 DEGREES
EKG VENTRICULAR RATE: 106 BPM
EOSINOPHILS ABSOLUTE: 0 K/UL (ref 0–0.6)
EOSINOPHILS RELATIVE PERCENT: 0.1 %
GFR AFRICAN AMERICAN: >60
GFR NON-AFRICAN AMERICAN: >60
GLUCOSE BLD-MCNC: 150 MG/DL (ref 70–99)
HCT VFR BLD CALC: 34.2 % (ref 40.5–52.5)
HEMOGLOBIN: 11.5 G/DL (ref 13.5–17.5)
LYMPHOCYTES ABSOLUTE: 0.5 K/UL (ref 1–5.1)
LYMPHOCYTES RELATIVE PERCENT: 12.3 %
MCH RBC QN AUTO: 29.3 PG (ref 26–34)
MCHC RBC AUTO-ENTMCNC: 33.7 G/DL (ref 31–36)
MCV RBC AUTO: 86.9 FL (ref 80–100)
MONOCYTES ABSOLUTE: 0.1 K/UL (ref 0–1.3)
MONOCYTES RELATIVE PERCENT: 1.4 %
NEUTROPHILS ABSOLUTE: 3.5 K/UL (ref 1.7–7.7)
NEUTROPHILS RELATIVE PERCENT: 85.9 %
PDW BLD-RTO: 14.7 % (ref 12.4–15.4)
PLATELET # BLD: 582 K/UL (ref 135–450)
PMV BLD AUTO: 7.3 FL (ref 5–10.5)
POTASSIUM REFLEX MAGNESIUM: 4.4 MMOL/L (ref 3.5–5.1)
RBC # BLD: 3.94 M/UL (ref 4.2–5.9)
SODIUM BLD-SCNC: 137 MMOL/L (ref 136–145)
TOTAL PROTEIN: 7 G/DL (ref 6.4–8.2)
WBC # BLD: 4 K/UL (ref 4–11)

## 2021-12-01 PROCEDURE — 6370000000 HC RX 637 (ALT 250 FOR IP): Performed by: HOSPITALIST

## 2021-12-01 PROCEDURE — 93010 ELECTROCARDIOGRAM REPORT: CPT | Performed by: INTERNAL MEDICINE

## 2021-12-01 PROCEDURE — 2580000003 HC RX 258: Performed by: HOSPITALIST

## 2021-12-01 PROCEDURE — G0378 HOSPITAL OBSERVATION PER HR: HCPCS

## 2021-12-01 PROCEDURE — 96372 THER/PROPH/DIAG INJ SC/IM: CPT

## 2021-12-01 PROCEDURE — 86140 C-REACTIVE PROTEIN: CPT

## 2021-12-01 PROCEDURE — 6360000002 HC RX W HCPCS: Performed by: HOSPITALIST

## 2021-12-01 PROCEDURE — 96376 TX/PRO/DX INJ SAME DRUG ADON: CPT

## 2021-12-01 PROCEDURE — 85379 FIBRIN DEGRADATION QUANT: CPT

## 2021-12-01 PROCEDURE — 36415 COLL VENOUS BLD VENIPUNCTURE: CPT

## 2021-12-01 PROCEDURE — 94761 N-INVAS EAR/PLS OXIMETRY MLT: CPT

## 2021-12-01 PROCEDURE — 94680 O2 UPTK RST&XERS DIR SIMPLE: CPT

## 2021-12-01 PROCEDURE — 85025 COMPLETE CBC W/AUTO DIFF WBC: CPT

## 2021-12-01 PROCEDURE — 94640 AIRWAY INHALATION TREATMENT: CPT

## 2021-12-01 PROCEDURE — 80053 COMPREHEN METABOLIC PANEL: CPT

## 2021-12-01 RX ORDER — ALBUTEROL SULFATE 90 UG/1
2 AEROSOL, METERED RESPIRATORY (INHALATION)
Status: DISCONTINUED | OUTPATIENT
Start: 2021-12-01 | End: 2021-12-01

## 2021-12-01 RX ORDER — ALBUTEROL SULFATE 90 UG/1
2 AEROSOL, METERED RESPIRATORY (INHALATION) EVERY 4 HOURS
Status: DISCONTINUED | OUTPATIENT
Start: 2021-12-01 | End: 2021-12-01 | Stop reason: HOSPADM

## 2021-12-01 RX ORDER — GUAIFENESIN/DEXTROMETHORPHAN 100-10MG/5
5 SYRUP ORAL EVERY 4 HOURS PRN
Qty: 120 ML | Refills: 0 | Status: SHIPPED | OUTPATIENT
Start: 2021-12-01 | End: 2021-12-11

## 2021-12-01 RX ORDER — PREDNISONE 10 MG/1
10 TABLET ORAL DAILY
Qty: 30 TABLET | Refills: 0 | Status: SHIPPED | OUTPATIENT
Start: 2021-12-01 | End: 2021-12-11

## 2021-12-01 RX ADMIN — Medication 2 PUFF: at 11:57

## 2021-12-01 RX ADMIN — BENZONATATE 100 MG: 100 CAPSULE ORAL at 00:17

## 2021-12-01 RX ADMIN — ATORVASTATIN CALCIUM 10 MG: 10 TABLET, FILM COATED ORAL at 00:17

## 2021-12-01 RX ADMIN — SODIUM CHLORIDE, PRESERVATIVE FREE 10 ML: 5 INJECTION INTRAVENOUS at 00:22

## 2021-12-01 RX ADMIN — ZINC SULFATE 220 MG (50 MG) CAPSULE 50 MG: CAPSULE at 09:32

## 2021-12-01 RX ADMIN — OXYCODONE HYDROCHLORIDE AND ACETAMINOPHEN 500 MG: 500 TABLET ORAL at 09:32

## 2021-12-01 RX ADMIN — Medication 2 PUFF: at 03:20

## 2021-12-01 RX ADMIN — ENOXAPARIN SODIUM 30 MG: 100 INJECTION SUBCUTANEOUS at 09:32

## 2021-12-01 RX ADMIN — Medication 2000 UNITS: at 09:33

## 2021-12-01 RX ADMIN — DOXAZOSIN 4 MG: 1 TABLET ORAL at 00:17

## 2021-12-01 RX ADMIN — SODIUM CHLORIDE, PRESERVATIVE FREE 10 ML: 5 INJECTION INTRAVENOUS at 09:35

## 2021-12-01 RX ADMIN — NIFEDIPINE 60 MG: 30 TABLET, FILM COATED, EXTENDED RELEASE ORAL at 09:32

## 2021-12-01 RX ADMIN — DEXAMETHASONE SODIUM PHOSPHATE 6 MG: 10 INJECTION, SOLUTION INTRAMUSCULAR; INTRAVENOUS at 09:32

## 2021-12-01 RX ADMIN — OXYCODONE HYDROCHLORIDE AND ACETAMINOPHEN 500 MG: 500 TABLET ORAL at 00:17

## 2021-12-01 RX ADMIN — ACETAMINOPHEN 650 MG: 325 TABLET ORAL at 04:12

## 2021-12-01 RX ADMIN — Medication 2 PUFF: at 08:25

## 2021-12-01 ASSESSMENT — PAIN DESCRIPTION - FREQUENCY: FREQUENCY: INTERMITTENT

## 2021-12-01 ASSESSMENT — PAIN SCALES - GENERAL
PAINLEVEL_OUTOF10: 0
PAINLEVEL_OUTOF10: 0
PAINLEVEL_OUTOF10: 1
PAINLEVEL_OUTOF10: 8

## 2021-12-01 ASSESSMENT — PAIN DESCRIPTION - LOCATION: LOCATION: CHEST

## 2021-12-01 ASSESSMENT — PAIN DESCRIPTION - DESCRIPTORS: DESCRIPTORS: SORE

## 2021-12-01 ASSESSMENT — PAIN DESCRIPTION - PAIN TYPE: TYPE: ACUTE PAIN

## 2021-12-01 NOTE — PROGRESS NOTES
Admission assessment complete. VSS. Meds given per MAR. Pt did come up without working tele that was not confirmed, placed on bedside monitor once in room. Spoke with Pt on what brought him back in today, Pt expresses that he has not really slept since he has gotten home from last admission due to cough at night time. Pt confirms that with cough at home he gets short of breath and OTC Robitussin is not effective for more that short relief. No other concerns at this time. Pt is currently on room air and tele on. Call light within reach, will continue to monitor.

## 2021-12-01 NOTE — PROGRESS NOTES
Pt has increased pain from cough otherwise no new changes. Tylenol given per MAR. Call light within reach, will continue to monitor.

## 2021-12-01 NOTE — ED PROVIDER NOTES
I independently performed a history and physical on Ray Alejandra. All diagnostic, treatment, and disposition decisions were made by myself in conjunction with the advanced practice provider. Briefly, this is a 48 y.o. male here for shortness of breath and wheezing. Patient is also having pleuritic pain localized to the posterior thorax/back. Patient was recently admitted and treated for Covid pneumonia on 11/25/2021. He denies chest pain. On exam, patient appears uncomfortable and is having mild to moderate respiratory stress. He is tachycardic with regular rhythm. On auscultation of the lungs, he has diffuse wheezing with diminished breath sounds bilaterally, tachypnea, and increased work of breathing. EKG  The Ekg interpreted by me in the absence of a cardiologist shows. sinus tachycardia, egja=[id number]  Axis is   Normal  QTc is  normal  Intervals and Durations are unremarkable. T wave inversions in the anterior leads, suggesting possible ischemia  T wave versions are new in comparison to previous EKG from November 25,    MDM  Patient presented with increasing shortness of breath and fatigue since a recent COVID-19 diagnosis. He was hypoxic on room air to the upper 80s. He improved with treatments, but still intermittently desaturates to 90%. CT does not appear to show worsening infiltrates, but clinical status is much worse. Treated with dexamethasone and lovenox. FINAL IMPRESSION  1. Pneumonia due to COVID-19 virus    2. Asthma with acute exacerbation, unspecified asthma severity, unspecified whether persistent    3. Hypoxia        Blood pressure (!) 171/116, pulse 106, temperature 98.8 °F (37.1 °C), temperature source Oral, resp. rate 24, SpO2 95 %.      For further details of [address] [place] [address]'s emergency department encounter, please see documentation by advanced practice provider, Beula Alba, Candelaria Scheuermann, MD  12/13/21 6304

## 2021-12-01 NOTE — PROGRESS NOTES
12/01/21 1154   Resting (Room Air)   SpO2 99   During Walk (Room Air)   SpO2 93   Walk/Assistance Device Ambulation   During Walk (On O2)   Walk/Assistance Device Ambulation   After Walk   SpO2 98   Rate of Dyspnea 0   Does the Patient Qualify for Home O2 No

## 2021-12-01 NOTE — ED NOTES
Bed: 15  Expected date:   Expected time:   Means of arrival:   Comments:  Gómez Sevilla RN  11/30/21 2052

## 2021-12-01 NOTE — ED NOTES
Report called to [facility], RN verbalized understanding and denied any need for further information, patient to be transported to unit at this time, patient visible on tele monitor on unit      Shahnaz Willams RN  11/30/21 7874

## 2021-12-01 NOTE — PROGRESS NOTES
Dc instructions given to patient with scripts. Patient states understanding. IV removed and dressing applied. Patient transported out by this RN and all belongings gasve to patient.

## 2021-12-01 NOTE — RT PROTOCOL NOTE
RT Inhaler-Nebulizer Bronchodilator Protocol Note    There is a bronchodilator order in the chart from a provider indicating to follow the RT Bronchodilator Protocol and there is an Initiate RT Inhaler-Nebulizer Bronchodilator Protocol order as well (see protocol at bottom of note). CXR Findings:  XR CHEST PORTABLE    Result Date: 11/30/2021  Similar appearance of subpleural, basilar predominant airspace opacities highly suggestive of COVID-19 pneumonia given reported infection. The findings from the last RT Protocol Assessment were as follows:   History Pulmonary Disease: None or smoker <15 pack years  Respiratory Pattern: Dyspnea on exertion or RR 21-25 bpm  Breath Sounds: Inspiratory and expiratory or bilateral wheezing and/or rhonchi  Cough: Strong, productive  Indication for Bronchodilator Therapy: Wheezing associated with pulm disorder  Bronchodilator Assessment Score: 9    Aerosolized bronchodilator medication orders have been revised according to the RT Inhaler-Nebulizer Bronchodilator Protocol below. Respiratory Therapist to perform RT Therapy Protocol Assessment initially then follow the protocol. Repeat RT Therapy Protocol Assessment PRN for score 0-3 or on second treatment, BID, and PRN for scores above 3. No Indications - adjust the frequency to every 6 hours PRN wheezing or bronchospasm, if no treatments needed after 48 hours then discontinue using Per Protocol order mode. If indication present, adjust the RT bronchodilator orders based on the Bronchodilator Assessment Score as indicated below. Use Inhaler orders unless patient has one or more of the following: on home nebulizer, not able to hold breath for 10 seconds, is not alert and oriented, cannot activate and use MDI correctly, or respiratory rate 25 breaths per minute or more, then use the equivalent nebulizer order(s) with same Frequency and PRN reasons based on the score.   If a patient is on this medication at home then do not decrease Frequency below that used at home. 0-3 - enter or revise RT bronchodilator order(s) to equivalent RT Bronchodilator order with Frequency of every 4 hours PRN for wheezing or increased work of breathing using Per Protocol order mode. 4-6 - enter or revise RT Bronchodilator order(s) to two equivalent RT bronchodilator orders with one order with BID Frequency and one order with Frequency of every 4 hours PRN wheezing or increased work of breathing using Per Protocol order mode. 7-10 - enter or revise RT Bronchodilator order(s) to two equivalent RT bronchodilator orders with one order with TID Frequency and one order with Frequency of every 4 hours PRN wheezing or increased work of breathing using Per Protocol order mode. 11-13 - enter or revise RT Bronchodilator order(s) to one equivalent RT bronchodilator order with QID Frequency and an Albuterol order with Frequency of every 4 hours PRN wheezing or increased work of breathing using Per Protocol order mode. Greater than 13 - enter or revise RT Bronchodilator order(s) to one equivalent RT bronchodilator order with every 4 hours Frequency and an Albuterol order with Frequency of every 2 hours PRN wheezing or increased work of breathing using Per Protocol order mode. RT to enter RT Home Evaluation for COPD & MDI Assessment order using Per Protocol order mode.     Electronically signed by Kristopher Mccarty RCP on 12/1/2021 at 1:13 AM

## 2021-12-01 NOTE — H&P
_    [facility] HOSPITALIST HISTORY AND PHYSICAL/CONSULT    11/30/2021 7:41 PM    Patient Information:  Froilan Pablo is a 48 y.o. male [id number]    PCP:  Waleska Ocasio MD (Tel: [phone] )    Date of Service:   Pt seen/examined on 11/30/2021     Placed in Observation. Chief complaint:    Chief Complaint   Patient presents with    Shortness of Breath     PT arrived with c/o SOB/asthma recently COVID+       History of Present Illness:  Humaira Cazares is a 48 y.o. male who presented with   · Worsening SOB and cough at home and hence he returns to ED today    · Recently d/jordan from [facility] at 11/26/21 for COVID PNA as well. He was Rxed supportively during the last admission . He Did not need O2 that admission and hence discharged w. Supportive Rx plan to home   ·        History and pertinent information obtained from   · ED provider   · Prior Chart    · Patient        Notable/Significant ED Findings/VItals :   · HTN   · Hypoxemia +   · Tachycardia +        While in ED  · Patient care Given. · Appropriate Monitoring done. · Pertinent Labs done. See [facility] for details   · Pertinent Acute issues identified and managed . See Epic for details  · Pertinent consults called and case d/w them by ED Provider . See Epic for details. · Imaging  CXR ,  CT,  done in ED . While in ED, Indicated/Pertinent Medications/Care given as below      · ED Chart reviewed. · Medications reviewed w/c were give in ED . See [facility] for details on medications and orders  · Steroids X 1   · Lovenox X 1  · DMS X 1       · Imaging/Labs/Work up done in ED reviewed and their interpretation/active and acute issues, as mentioned below in Assessment and Plan. Currently, on my evaluation, patient's condition as below :   · Since arrival, post above Rx, patient is AO X 3   · Is now on 2 L NC O2     10 complete review of symptoms performed. Pertinent positives and negatives listed above in HPI.          REVIEW OF SYSTEMS: Pertinent positives and negatives are noted in the HPI . All other systems were reviewed and are negative. Past Medical History:         has a past medical history of Asthma, BPH associated with nocturia, Hypertension, Mixed hyperlipidemia, Obesity (BMI 30.0-34.9), and Prediabetes. Past Surgical History:         has no past surgical history on file. Medications:          Current Outpatient Medications on File Prior to Encounter   Medication Sig Dispense Refill    albuterol sulfate HFA (VENTOLIN HFA) 108 (90 Base) MCG/ACT inhaler Inhale 2 puffs into the lungs every 6 hours as needed for Wheezing 1 each 1    NIFEdipine (ADALAT CC) 60 MG extended release tablet TAKE 1 TABLET BY MOUTH EVERY DAY 90 tablet 0    clotrimazole-betamethasone (LOTRISONE) 1-0.05 % cream Apply topically 2 times daily. (Patient not taking: Reported on 11/29/2021) 45 g 2    terazosin (HYTRIN) 5 MG capsule Take 1 capsule by mouth nightly 90 capsule 0    atorvastatin (LIPITOR) 10 MG tablet Take 1 tablet by mouth daily 90 tablet 0    Blood Pressure KIT 1 kit by Does not apply route daily 1 kit 0         Allergies: Allergies   Allergen Reactions    Codeine      Nausea      Lisinopril Swelling     And cough    Other      \"Can't take any opiates\"          Social History:   reports that he has never smoked. He has never used smokeless tobacco. He reports that he does not drink alcohol and does not use drugs. Family History:      Father : HTN +       Physical Exam:  BP (!) 171/116   Pulse 106   Temp 98.8 °F (37.1 °C) (Oral)   Resp 24   SpO2 95%     General appearance: Appears comfortable. Well nourished   Eyes:  Sclera clear, pupils equal  ENT:  Dry  mucus membranes, Trachea midline. Cardiovascular: Regular rhythm, normal S1, S2. No edema in lower extremities   Respiratory:  Noted Dec AE B/ L .    Gastrointestinal:  Abdomen soft,  non-tender,  not distended, normal bowel sounds   Musculoskeletal:  No cyanosis in digits, neck supple  Neurology:  Cranial nerves grossly intact. Alert and oriented in time, place and person. No speech or motor deficits   Psychiatry:  Appropriate affect. Not agitated  Skin:  Warm, dry, normal turgor, no rash       Labs:     Recent Labs     11/30/21  1644   WBC 6.3   HGB 12.0*   HCT 36.2*   *     Recent Labs     11/30/21  1644      K 3.7      CO2 21   BUN 18   CREATININE 1.2   CALCIUM 8.9     Recent Labs     11/30/21  1644   AST 27   ALT 57*   BILITOT 0.3   ALKPHOS 90     No results for input(s): INR in the last 72 hours. Recent Labs     11/30/21  [address] <0.01         Urinalysis:      Lab Results   Component Value Date    NITRU Negative 06/20/2021    WBCUA 1 01/03/2018    RBCUA 1 01/03/2018    BLOODU Negative 06/20/2021    SPECGRAV >1.030 06/20/2021    GLUCOSEU Negative 06/20/2021         Radiology:         EKG/Telemonitor :    I have reviewed the EKG  ST +     IMAGING :     CT CHEST PULMONARY EMBOLISM W CONTRAST   Final Result   1. No acute pulmonary artery thromboembolic disease. 2.  Mild-to-moderate COVID-19 pneumonia is similar to the prior study done   11/25/2021. XR CHEST PORTABLE   Final Result   Similar appearance of subpleural, basilar predominant airspace opacities   highly suggestive of COVID-19 pneumonia given reported infection. PROBLEM LIST      Active Hospital Problems    Diagnosis Date Noted    COVID-19 [U07.1] 11/30/2021    Pneumonia [J18.9] 11/25/2021    Mixed hyperlipidemia [E78.2] 08/09/2021    BPH associated with nocturia [N40.1, R35.1] 08/09/2021    Hypertensive urgency [I16.0] 06/20/2021    HTN (hypertension), benign [I10] 05/26/2017         PLAN/ORDERS FOR ACUTE MEDICAL ISSUES FOR THIS ADMISSION/HOSPITALIZATION       COVID PNA , POA . Maintain Droplet and contact precautions for now . Will send routine and then F/u daily labs a/w COV-19 Infection. => Started Supportive care .   Started Empiric decadron @ admission per protocol recommendations. Elevated D dimer . Will Start anticoagulation per protocol , for elevated D Dimer 400s @ Admission, in suspected COVID / [place] positive Patient [ 2/2 High risk for Micro-Thrombii and Clotting issues in COVID Positive Patients ] . · New onset Hypoxemia PRN 2/2 above, POA . Is now on NC O2 as above . May need home o2 at discharge as well   ·   · HTN   · BPH   · Mixed Hyperlipidemia       · Home medications for chronic medical problems  reviewed and Held / Resumed / Pertinent changes made [as mentioned above] in home medications, as clinically warranted/Indicated . See EPIC orders for details         · DVT Prophylaxis . Is on Lovenox BID  ; + SCDs   · Nutrition/Diet. No diet orders on file  · Code Status . Prior  · PT/OT and ambulatory Eval Status. Ambulate As tolerated   · Probable LOS & future Disposition planned post discharge . Home in 1-2 days       Please see EPIC orders for detailed orders/recommendations of plan and medications. CONSULTS ORDERED @ ADMISSION   None      Medical Decision Making : HIGH     Total patient  Care [ Direct and Indirect ] time spent in evaluating the patient an discussing plan with appropriate staff/patient/family members is 48 min       Izzy Lael MD    Hospitalist, [facility].    11/30/2021 7:41 PM

## 2021-12-02 ENCOUNTER — CARE COORDINATION (OUTPATIENT)
Dept: CASE MANAGEMENT | Age: 46
End: 2021-12-02

## 2021-12-02 NOTE — CARE COORDINATION
[facility] Transitions Follow Up Call    2021    Patient: Maddison Chavira  Patient : 1975   MRN: [id number]  Reason for Admission:   Discharge Date: 21 RARS: Readmission Risk Score: 12 ( )         Follow up call attempted, left contact info on vm.       Follow Up  Future Appointments   Date Time Provider Tamera Pisano   2022  9:30 AM Mitch Holly MD [address]       Saadia Bermeo RN

## 2021-12-02 NOTE — CARE COORDINATION
[facility] Transitions Follow Up Call    2021    Patient: Ray Alejandra  Patient : 1975   MRN: [id number]  Reason for Admission:   Discharge Date: 21 RARS: Readmission Risk Score: 12 ( )         Spoke with: [facility] [place] Transitions Subsequent and Final Call    Subsequent and Final Calls  Do you have any ongoing symptoms?: Yes  Onset of Patient-reported symptoms: Today  Patient-reported symptoms: Shortness of Breath  Interventions for patient-reported symptoms: Notified PCP/Physician  Have your medications changed?: Yes  Patient Reports: prednisone, robitussin  Do you have any questions related to your medications?: No  Do you currently have any active services?: No  Do you have any needs or concerns that I can assist you with?: Yes  Patient-reported Needs or Concerns: pt feels like he needs oxygen,can this nurse assist  Identified Barriers: None  Care Transitions Interventions     DME Assistance: Completed   Other Interventions:         Pt returned my call and stated he can not sleep and is very SOB. Taking medications and breathing treatments as prescribed and it is not helping. He stated that Dr. Tonie Barakat was going to send him home on oxygen but the respiratory therapist \"talked him out of it\". He is even sleeping on his stomach as Dr. Tonie Barakat suggested but that has not helped as well. This nurse informed pt that contact will be made by this nurse to Dr. Tonie Barakat to see if oxygen as well as oximeter order can be obtained, pt appreciative. Perfect served Dr. Tonie Barakat, he will take care of writing the orders, this nurse will reach out to a DME company to see if they can fulfill the orders. This nurse spoke to Yvon Dunbar with [employer] and he stated that since the respiratory therapist test  stated that he did not have a need for O2, his insurance would not cover the oxygen or oximeter.  The cost would be between 100-150 dollars for a month's rental.He suggested reaching out to PCP office for orders. This nurse reached out to JULIANN Raymond with Dr. Garcia [place] office. She will assist in getting a f/u appt with Dr. Gonzalez Rita and /or get the orders for oxygen, oximeter. This nurse called pt and spouse and updated them. This nurse called pt back @ 3:30pm, to see if he had heard from the PCP office, he had not nor has this nurse. The pt stated he was finally able to get some rest sitting in recliner, feeling a little better. He stated at this point he did not want to pay out of pocket for O2, will wait to see what PCP does/says. Pt appreciated all this nurses efforts today. Will touch base with pt tomorrow. Advised pt to return to ER if becomes more SOB or any other worsening signs and symptoms,  pt verbalized understanding. .     Follow Up  Future Appointments   Date Time Provider Tamera Pisano   1/24/2022  9:30 AM Jessica Thompson MD [address]       Andrez Avendano RN

## 2021-12-03 ENCOUNTER — TELEPHONE (OUTPATIENT)
Dept: INTERNAL MEDICINE CLINIC | Age: 46
End: 2021-12-03

## 2021-12-03 ENCOUNTER — CARE COORDINATION (OUTPATIENT)
Dept: CARE COORDINATION | Age: 46
End: 2021-12-03

## 2021-12-03 ENCOUNTER — CARE COORDINATION (OUTPATIENT)
Dept: CASE MANAGEMENT | Age: 46
End: 2021-12-03

## 2021-12-03 NOTE — CARE COORDINATION
ACM received return call from patient. Pt reports he would like to f/u with PCP or someone in the office for as soon as possible. Pt continued to cough during the conversation. He stated he is not feeling well, stating he feels as bad as he did in the hospital.      ACM called and assisted pt with scheduling a f/u VV for 12.8.21 at [address] [place] [address], with the assistance of [employer]OD[employer] in scheduling. Pt reports he is lying in bed, he stated he is lightheaded/dizzy consistently, not only when moving, his cough and SOB is \"bad like it was in the hospital,\" he stated \"things are confusing to me. \" ACM asked if he could tell me what day it is and he could not. Pt was able to say his Bday and who the president of the [employer] was with some hesitation. He stated he is trying to drink fluids and eat, but he is not taking in much. He stated his UOP, is \"like it was in the hospital,\" and when ACM asked if that means it's decreased or not, he stated he needed to get off the phone because he is not feeling well. ACM asked if there was someone home with him and he stated his wife is at work. ACM informed pt he is describing symptoms (confusion, dizziness/lightheadedness, SOB) in which I would encourage him to go back to the ED for evaluation. ACM offered to call 911 with patient on the line and he declined, stating he will call if he needs to, but right now, \"I need to get off the phone, I need you to leave me alone because I am really sick and I need to rest.\" ACM explained his symptoms are concerning and, again,  ACM offered to call 911 with patient on the line and the line was disconnected. PLAN:    CTN to f/u with patient at a later date/time.

## 2021-12-03 NOTE — CARE COORDINATION
ACM assisting Cedric ROSALES, today regarding possible order for home O2. ACM reviewed encounters and can see the update from ViSsevero Ruiz who is covering for Dr. Gonzalez Rita today:     \"Does not appear he qualifies for oxygen based on the RT note - holding for PCP. Manuel Wagner"    ACM attempted to f/u with patient to let him know that this will be held until the PCP returns on Monday, however pt was unable to reach. ACM was going to review s/s of when to return to the ED with patient as well. Pt did not answer and the call transferred to a unidentified  system. ACM only left name and contact information and a request for a return call from pt. [facility] to send a message to Shonda Gilman as well.

## 2021-12-04 LAB
BLOOD CULTURE, ROUTINE: NORMAL
CULTURE, BLOOD 2: NORMAL

## 2021-12-06 ENCOUNTER — CARE COORDINATION (OUTPATIENT)
Dept: CASE MANAGEMENT | Age: 46
End: 2021-12-06

## 2021-12-06 ENCOUNTER — TELEMEDICINE (OUTPATIENT)
Dept: INTERNAL MEDICINE CLINIC | Age: 46
End: 2021-12-06
Payer: COMMERCIAL

## 2021-12-06 DIAGNOSIS — J21.9 BRONCHIOLITIS: ICD-10-CM

## 2021-12-06 DIAGNOSIS — I10 HTN (HYPERTENSION), BENIGN: ICD-10-CM

## 2021-12-06 DIAGNOSIS — U07.1 COVID-19: Primary | ICD-10-CM

## 2021-12-06 PROCEDURE — 1036F TOBACCO NON-USER: CPT | Performed by: INTERNAL MEDICINE

## 2021-12-06 PROCEDURE — G8484 FLU IMMUNIZE NO ADMIN: HCPCS | Performed by: INTERNAL MEDICINE

## 2021-12-06 PROCEDURE — G8417 CALC BMI ABV UP PARAM F/U: HCPCS | Performed by: INTERNAL MEDICINE

## 2021-12-06 PROCEDURE — G8427 DOCREV CUR MEDS BY ELIG CLIN: HCPCS | Performed by: INTERNAL MEDICINE

## 2021-12-06 PROCEDURE — 99214 OFFICE O/P EST MOD 30 MIN: CPT | Performed by: INTERNAL MEDICINE

## 2021-12-06 PROCEDURE — 1111F DSCHRG MED/CURRENT MED MERGE: CPT | Performed by: INTERNAL MEDICINE

## 2021-12-06 RX ORDER — BENZONATATE 200 MG/1
200 CAPSULE ORAL 3 TIMES DAILY PRN
Qty: 30 CAPSULE | Refills: 0 | Status: SHIPPED | OUTPATIENT
Start: 2021-12-06 | End: 2021-12-13

## 2021-12-06 RX ORDER — PREDNISONE 20 MG/1
20 TABLET ORAL 2 TIMES DAILY
Qty: 10 TABLET | Refills: 0 | Status: SHIPPED | OUTPATIENT
Start: 2021-12-06 | End: 2021-12-16 | Stop reason: SDUPTHER

## 2021-12-06 RX ORDER — AZITHROMYCIN 250 MG/1
250 TABLET, FILM COATED ORAL SEE ADMIN INSTRUCTIONS
Qty: 6 TABLET | Refills: 0 | Status: SHIPPED | OUTPATIENT
Start: 2021-12-06 | End: 2021-12-11

## 2021-12-06 ASSESSMENT — ENCOUNTER SYMPTOMS
SINUS PAIN: 0
WHEEZING: 0
SHORTNESS OF BREATH: 1
CONSTIPATION: 0
COUGH: 1
COLOR CHANGE: 0
ABDOMINAL PAIN: 0
CHEST TIGHTNESS: 0
BACK PAIN: 0
BLOOD IN STOOL: 0

## 2021-12-06 NOTE — PROGRESS NOTES
Yesica Mancuso (:  1975) is a 48 y.o. male,Established patient, here for evaluation of the following chief complaint(s): No chief complaint on file. ASSESSMENT/PLAN:  1. COVID-19  2. Bronchiolitis  -     predniSONE (DELTASONE) 20 MG tablet; Take 1 tablet by mouth 2 times daily for 5 days, Disp-10 tablet, R-0Normal  -     azithromycin (ZITHROMAX) 250 MG tablet; Take 1 tablet by mouth See Admin Instructions for 5 days 500mg on day 1 followed by 250mg on days 2 - 5, Disp-6 tablet, R-0Normal  3. HTN (hypertension), benign    Discussed patient his current symptomatology he is slowly recovering from Covid but now is having a change in his symptoms we will cannot start him on azithromycin with prednisone and have him check in with me within 48 hours if his symptoms not improve or get worse then we can send him in for further evaluation in the hospital but for the time being it seems that his symptoms are not severe enough and he will advise of any change    Patient blood pressure is well controlled he is taking his medication regular basis his blood pressure according to him at home is running in the 130s over 80s  Return if symptoms worsen or fail to improve, for As scheduled. SUBJECTIVE/OBJECTIVE:  Reviewed with patient his hospitalization the patient was feeling better and now he is gradually start to feel worse again he does not have any fever or chills but he is having some cough      Review of Systems   Constitutional: Negative for activity change, appetite change, fatigue and unexpected weight change. HENT: Negative for congestion, ear pain and sinus pain. Respiratory: Positive for cough and shortness of breath. Negative for chest tightness and wheezing. Cardiovascular: Negative for chest pain and palpitations. Gastrointestinal: Negative for abdominal pain, blood in stool and constipation. Endocrine: Negative for cold intolerance, heat intolerance and polyuria.    Genitourinary: Negative for dysuria, frequency and urgency. Musculoskeletal: Negative for arthralgias, back pain and myalgias. Skin: Negative for color change and rash. Neurological: Negative for weakness and headaches. Hematological: Negative for adenopathy. Does not bruise/bleed easily. Psychiatric/Behavioral: Negative for agitation, dysphoric mood and sleep disturbance. No flowsheet data found. Physical Exam  Constitutional:       Appearance: Normal appearance. Pulmonary:      Effort: Pulmonary effort is normal.   Neurological:      Mental Status: He is alert. Psychiatric:         Mood and Affect: Mood normal.         Behavior: Behavior normal.         Thought Content:  Thought content normal.         Judgment: Judgment normal.         [INSTRUCTIONS:  \"[x]\" Indicates a positive item  \"[]\" Indicates a negative item  -- DELETE ALL ITEMS NOT EXAMINED]    Constitutional: [x] Appears well-developed and well-nourished [x] No apparent distress      [] Abnormal -     Mental status: [x] Alert and awake  [x] Oriented to person/place/time [x] Able to follow commands    [] Abnormal -     Eyes:   EOM    [x]  Normal    [] Abnormal -   Sclera  [x]  Normal    [] Abnormal -          Discharge [x]  None visible   [] Abnormal -     HENT: [x] Normocephalic, atraumatic  [] Abnormal -   [x] Mouth/Throat: Mucous membranes are moist    External Ears [x] Normal  [] Abnormal -    Neck: [x] No visualized mass [] Abnormal -     Pulmonary/Chest: [x] Respiratory effort normal   [x] No visualized signs of difficulty breathing or respiratory distress        [] Abnormal -      Musculoskeletal:   [x] Normal gait with no signs of ataxia         [x] Normal range of motion of neck        [] Abnormal -     Neurological:        [x] No Facial Asymmetry (Cranial nerve 7 motor function) (limited exam due to video visit)          [x] No gaze palsy        [] Abnormal -          Skin:        [x] No significant exanthematous lesions or discoloration noted on facial skin         [] Abnormal -            Psychiatric:       [x] Normal Affect [] Abnormal -        [x] No Hallucinations    Other pertinent observable physical exam findings:-          On this date 12/6/2021 I have spen40 minutes reviewing previous notes, test results and face to face (virtual) with the patient discussing the diagnosis and importance of compliance with the treatment plan as well as documenting on the day of the visit. Amaya Fernández, was evaluated through a synchronous (real-time) audio-video encounter. The patient (or guardian if applicable) is aware that this is a billable service. Verbal consent to proceed has been obtained within the past 12 months. The visit was conducted pursuant to the emergency declaration under the [address] [place] [address] authority and the [employer] and [employer] General Act. Patient identification was verified, and a caregiver was present when appropriate. The patient was located in a state where the provider was credentialed to provide care. An electronic signature was used to authenticate this note.     --Brian Santos MD

## 2021-12-06 NOTE — CARE COORDINATION
Per Epic, Pt had a VV with PCP today. Will f/u at a later date.   Lilly Cool LPN, [facility]: [phone]

## 2021-12-08 ENCOUNTER — CARE COORDINATION (OUTPATIENT)
Dept: CASE MANAGEMENT | Age: 46
End: 2021-12-08

## 2021-12-08 ENCOUNTER — TELEPHONE (OUTPATIENT)
Dept: INTERNAL MEDICINE CLINIC | Age: 46
End: 2021-12-08

## 2021-12-08 NOTE — TELEPHONE ENCOUNTER
Called pt to advise per Karely Vargas that he not take the evening dose of prednisone. Patient states when he doesn't take it, he coughs until he chokes. He states he would like Dr. Karlos Moreau to advise as soon as he gets in tomorrow morning.

## 2021-12-08 NOTE — CARE COORDINATION
[facility] Transitions Follow Up Call    2021    Patient: Dayron Christensen  Patient : 1975   MRN: [id number]  Reason for Admission: [address]  Discharge Date: 21 RARS: Readmission Risk Score: 12 ( )         Spoke with:     Care Transitions Subsequent and Final Call    Subsequent and Final Calls  Care Transitions Interventions  Other Interventions:         Pt states he's having ongoing SOB with exertion and occasionally at rest as well. He does not have a pulse ox. Discussed how pt could obtain one if he would like to and how to monitor O2 sat. Pt expressed that he would like to speak to someone regarding his experience in the hospital and asked if CTN would be the appropraite person. Informed pt I would be happy to hear his feedback and direct him to someone that could help. Pt then disconnected the line.      Follow Up  Future Appointments   Date Time Provider Tamera Pisano   2022  9:30 AM MD SAVITA Pan

## 2021-12-09 NOTE — TELEPHONE ENCOUNTER
Please call the patient advised him to take 2 tablets of Benadryl at bedtime and also advised him that the effect of the prednisone will wear off within a couple of days

## 2021-12-13 NOTE — DISCHARGE SUMMARY
[facility] DISCHARGE SUMMARY    Patient Demographics    Patient. Lindsay Chery  Date of Birth. 1975  MRN. [id number]     Primary care provider. Reinier Munguia MD  (Tel: [phone])    Admit date: 11/30/2021    Discharge date (blank if same as Note Date): 12/1/2021  Note Date: 12/13/2021     Reason for Hospitalization. Chief Complaint   Patient presents with    Shortness of Breath     PT arrived with c/o SOB/asthma recently [facility]. Hypoxia  -0 in patient with recent Covid treatment. With worsening shortness of breath admitted overnight patient had home O2 evaluation. Patient was discharged stable with home oxygen and steroid. Hemodynamically stable    Consults. None    Physical examination on discharge day. BP (!) 144/96   Pulse 95   Temp 97.8 °F (36.6 °C) (Temporal)   Resp 20   Ht 6' (1.829 m)   Wt 218 lb (98.9 kg)   SpO2 95%   BMI 29.57 kg/m²   General appearance. Alert. Looks comfortable. HEENT. Sclera clear. Moist mucus membranes. Cardiovascular. Regular rate and rhythm, normal S1, S2. No murmur. Respiratory. Not using accessory muscles. Clear to auscultation bilaterally, no wheeze. Gastrointestinal. Abdomen soft, non-tender, not distended, normal bowel sounds  Neurology. Facial symmetry. No speech deficits. Moving all extremities equally. Extremities. No edema in lower extremities. Skin. Warm, dry, normal turgor    Condition at time of discharge stable     Medication instructions provided to patient at discharge.      Medication List      CONTINUE taking these medications    albuterol sulfate  (90 Base) MCG/ACT inhaler  Commonly known as: Ventolin HFA  Inhale 2 puffs into the lungs every 6 hours as needed for Wheezing     atorvastatin 10 MG tablet  Commonly known as: LIPITOR  Take 1 tablet by mouth daily     Blood Pressure Kit  1 kit by Does not apply route daily     clotrimazole-betamethasone 1-0.05 % cream  Commonly known as: Lotrisone  Apply topically 2 times daily. NIFEdipine 60 MG extended release tablet  Commonly known as: ADALAT CC  TAKE 1 TABLET BY MOUTH EVERY DAY     terazosin 5 MG capsule  Commonly known as: HYTRIN  Take 1 capsule by mouth nightly        ASK your doctor about these medications    guaiFENesin-dextromethorphan 100-10 MG/5ML syrup  Commonly known as: ROBITUSSIN DM  Take 5 mLs by mouth every 4 hours as needed for Cough  Ask about: Should I take this medication?     predniSONE 10 MG tablet  Commonly known as: DELTASONE  Take 1 tablet by mouth daily for 10 days Prednisone taper:        Prednisone 10 mg 4 pills x 3 days -> 3 pills x 3 days -> 2 pills x 3 days -> 1 pill x 3 days  Ask about: Should I take this medication? Where to Get Your Medications      These medications were sent to [address], [address] [place] [address] [address], [address] [address]    Phone: [phone]   · guaiFENesin-dextromethorphan 100-10 MG/5ML syrup     You can get these medications from any pharmacy    Bring a paper prescription for each of these medications  · predniSONE 10 MG tablet         Discharge recommendations given to patient. Follow Up. pcp in 1 week   Disposition. home  Activity. activity as tolerated  Diet: No diet orders on file      Spent 45  minutes in discharge process.     Signed:  Felipe Zhong MD     12/13/2021 8:06 AM      Hypoxia

## 2021-12-15 ENCOUNTER — TELEPHONE (OUTPATIENT)
Dept: INTERNAL MEDICINE CLINIC | Age: 46
End: 2021-12-15

## 2021-12-15 NOTE — TELEPHONE ENCOUNTER
Patient is calling back regarding prescription for ibuprofen 800 mg. He states he needs the medication. I did advise patient Dr Shannon Babb is not in the office on Wednesday afternoons.

## 2021-12-15 NOTE — TELEPHONE ENCOUNTER
----- Message from Dina Lala sent at 12/15/2021 11:27 AM EST -----  Subject: Message to Provider    QUESTIONS  Information for Provider? Pt of Dr. Alma Go is on prescribed medication and   OTC meds, however is still not feeling well. Requesting RX for Ibuprofen   800 mg due to strong aches/pain especially at night.   ---------------------------------------------------------------------------  --------------  CALL BACK INFO  What is the best way for the office to contact you? OK to leave message on   voicemail  Preferred Call Back Phone Number? [phone]  ---------------------------------------------------------------------------  --------------  SCRIPT ANSWERS  Relationship to Patient?  Self

## 2021-12-16 ENCOUNTER — TELEPHONE (OUTPATIENT)
Dept: INTERNAL MEDICINE CLINIC | Age: 46
End: 2021-12-16

## 2021-12-16 DIAGNOSIS — J21.9 BRONCHIOLITIS: ICD-10-CM

## 2021-12-16 RX ORDER — ALBUTEROL SULFATE 90 UG/1
2 AEROSOL, METERED RESPIRATORY (INHALATION) EVERY 6 HOURS PRN
Qty: 1 EACH | Refills: 1 | Status: SHIPPED | OUTPATIENT
Start: 2021-12-16 | End: 2022-05-03

## 2021-12-16 RX ORDER — PREDNISONE 20 MG/1
20 TABLET ORAL 2 TIMES DAILY
Qty: 14 TABLET | Refills: 0 | Status: SHIPPED | OUTPATIENT
Start: 2021-12-16 | End: 2021-12-23

## 2021-12-16 RX ORDER — DOXYCYCLINE HYCLATE 100 MG
100 TABLET ORAL 2 TIMES DAILY
Qty: 20 TABLET | Refills: 0 | Status: SHIPPED | OUTPATIENT
Start: 2021-12-16 | End: 2021-12-26

## 2021-12-16 RX ORDER — IBUPROFEN 600 MG/1
600 TABLET ORAL 3 TIMES DAILY PRN
Qty: 90 TABLET | Refills: 5 | Status: SHIPPED | OUTPATIENT
Start: 2021-12-16

## 2021-12-16 NOTE — TELEPHONE ENCOUNTER
Spoke with [facility] ([facility]) regarding pt. States pt has gotten worse over the last week and pt was taken to ER last night. Pt has finished prednisone and inhaler and doctor in ER advised pt to request new medication from PCP. Please advise.

## 2021-12-16 NOTE — TELEPHONE ENCOUNTER
Pt calling again, states he thinks he has pneumonia and is asking for Dr. Shahnaz Tapia to send over an antibiotic. Is now saying he has not finished previous prednisone script and would like antibiotic and ibuprofen 800MG sent over to pharmacy. Nothing on file showing pt was in the ER. [facility].

## 2021-12-16 NOTE — TELEPHONE ENCOUNTER
I have tried to pull info from [facility], and [address]. I do not see an ED visit note at all.

## 2021-12-22 ENCOUNTER — TELEPHONE (OUTPATIENT)
Dept: INTERNAL MEDICINE CLINIC | Age: 46
End: 2021-12-22

## 2021-12-22 DIAGNOSIS — I10 HTN (HYPERTENSION), BENIGN: ICD-10-CM

## 2021-12-22 NOTE — TELEPHONE ENCOUNTER
Patient lost his blood pressure medication. He is requesting a prescription for terazosin (HYTRIN) 5 MG capsule be sent to [facility] on [facility].

## 2021-12-23 RX ORDER — TERAZOSIN 5 MG/1
5 CAPSULE ORAL NIGHTLY
Qty: 90 CAPSULE | Refills: 0 | Status: SHIPPED | OUTPATIENT
Start: 2021-12-23 | End: 2022-02-08 | Stop reason: SDUPTHER

## 2021-12-23 NOTE — TELEPHONE ENCOUNTER
Pt calling again about how he hasn't taken his blood pressure medication in 3 days and feels sick and is asking that it please be called in. Pt asked that malina please call him as soon as it's sent over.

## 2022-01-20 ENCOUNTER — TELEPHONE (OUTPATIENT)
Dept: INTERNAL MEDICINE CLINIC | Age: 47
End: 2022-01-20

## 2022-01-20 NOTE — TELEPHONE ENCOUNTER
----- Message from Larry Seymour sent at 1/20/2022 11:19 AM EST -----  Subject: Message to Provider    QUESTIONS  Information for Provider? pt said that he was told to let Francina Snellen   know that he has swelling in his toes both feet may be due to change in   medication (terazosin) or (nefidepine) requesting a call back to dicuss  ---------------------------------------------------------------------------  --------------  [address]  What is the best way for the office to contact you? OK to leave message on   voicemail  Preferred Call Back Phone Number? [phone]  ---------------------------------------------------------------------------  --------------  SCRIPT ANSWERS  Relationship to Patient?  Self

## 2022-02-08 ENCOUNTER — OFFICE VISIT (OUTPATIENT)
Dept: INTERNAL MEDICINE CLINIC | Age: 47
End: 2022-02-08
Payer: COMMERCIAL

## 2022-02-08 VITALS
BODY MASS INDEX: 29.39 KG/M2 | WEIGHT: 217 LBS | HEIGHT: 72 IN | HEART RATE: 84 BPM | OXYGEN SATURATION: 98 % | DIASTOLIC BLOOD PRESSURE: 82 MMHG | SYSTOLIC BLOOD PRESSURE: 124 MMHG

## 2022-02-08 DIAGNOSIS — E78.2 MIXED HYPERLIPIDEMIA: ICD-10-CM

## 2022-02-08 DIAGNOSIS — E55.9 VITAMIN D DEFICIENCY: ICD-10-CM

## 2022-02-08 DIAGNOSIS — J45.20 MILD INTERMITTENT ASTHMA WITHOUT COMPLICATION: ICD-10-CM

## 2022-02-08 DIAGNOSIS — I10 HTN (HYPERTENSION), BENIGN: ICD-10-CM

## 2022-02-08 DIAGNOSIS — R73.03 PREDIABETES: ICD-10-CM

## 2022-02-08 DIAGNOSIS — I10 HTN (HYPERTENSION), BENIGN: Primary | ICD-10-CM

## 2022-02-08 DIAGNOSIS — J45.40 MODERATE PERSISTENT ASTHMA WITHOUT COMPLICATION: ICD-10-CM

## 2022-02-08 PROBLEM — J45.901 ASTHMA WITH ACUTE EXACERBATION, UNSPECIFIED ASTHMA SEVERITY, UNSPECIFIED WHETHER PERSISTENT: Status: RESOLVED | Noted: 2022-02-08 | Resolved: 2022-02-08

## 2022-02-08 PROBLEM — J18.9 PNEUMONIA: Status: RESOLVED | Noted: 2021-11-25 | Resolved: 2022-02-08

## 2022-02-08 PROBLEM — J45.901 ASTHMA WITH ACUTE EXACERBATION, UNSPECIFIED ASTHMA SEVERITY, UNSPECIFIED WHETHER PERSISTENT: Status: ACTIVE | Noted: 2022-02-08

## 2022-02-08 LAB
A/G RATIO: 1.7 (ref 1.1–2.2)
ALBUMIN SERPL-MCNC: 4.7 G/DL (ref 3.4–5)
ALP BLD-CCNC: 85 U/L (ref 40–129)
ALT SERPL-CCNC: 12 U/L (ref 10–40)
ANION GAP SERPL CALCULATED.3IONS-SCNC: 16 MMOL/L (ref 3–16)
AST SERPL-CCNC: 15 U/L (ref 15–37)
BASOPHILS ABSOLUTE: 0.1 K/UL (ref 0–0.2)
BASOPHILS RELATIVE PERCENT: 1.6 %
BILIRUB SERPL-MCNC: 0.4 MG/DL (ref 0–1)
BUN BLDV-MCNC: 15 MG/DL (ref 7–20)
CALCIUM SERPL-MCNC: 9.8 MG/DL (ref 8.3–10.6)
CHLORIDE BLD-SCNC: 103 MMOL/L (ref 99–110)
CHOLESTEROL, TOTAL: 249 MG/DL (ref 0–199)
CO2: 21 MMOL/L (ref 21–32)
CREAT SERPL-MCNC: 1.4 MG/DL (ref 0.9–1.3)
EOSINOPHILS ABSOLUTE: 0.9 K/UL (ref 0–0.6)
EOSINOPHILS RELATIVE PERCENT: 16.7 %
GFR AFRICAN AMERICAN: >60
GFR NON-AFRICAN AMERICAN: 54
GLUCOSE BLD-MCNC: 96 MG/DL (ref 70–99)
HCT VFR BLD CALC: 44.8 % (ref 40.5–52.5)
HDLC SERPL-MCNC: 63 MG/DL (ref 40–60)
HEMOGLOBIN: 14.7 G/DL (ref 13.5–17.5)
LDL CHOLESTEROL CALCULATED: 172 MG/DL
LYMPHOCYTES ABSOLUTE: 1.5 K/UL (ref 1–5.1)
LYMPHOCYTES RELATIVE PERCENT: 28.5 %
MAGNESIUM: 2.3 MG/DL (ref 1.8–2.4)
MCH RBC QN AUTO: 29.6 PG (ref 26–34)
MCHC RBC AUTO-ENTMCNC: 32.8 G/DL (ref 31–36)
MCV RBC AUTO: 90.3 FL (ref 80–100)
MONOCYTES ABSOLUTE: 0.5 K/UL (ref 0–1.3)
MONOCYTES RELATIVE PERCENT: 9.4 %
NEUTROPHILS ABSOLUTE: 2.3 K/UL (ref 1.7–7.7)
NEUTROPHILS RELATIVE PERCENT: 43.8 %
PDW BLD-RTO: 17.8 % (ref 12.4–15.4)
PLATELET # BLD: 378 K/UL (ref 135–450)
PMV BLD AUTO: 8.6 FL (ref 5–10.5)
POTASSIUM SERPL-SCNC: 4.2 MMOL/L (ref 3.5–5.1)
RBC # BLD: 4.97 M/UL (ref 4.2–5.9)
SODIUM BLD-SCNC: 140 MMOL/L (ref 136–145)
TOTAL PROTEIN: 7.4 G/DL (ref 6.4–8.2)
TRIGL SERPL-MCNC: 68 MG/DL (ref 0–150)
VITAMIN D 25-HYDROXY: 50.3 NG/ML
VLDLC SERPL CALC-MCNC: 14 MG/DL
WBC # BLD: 5.3 K/UL (ref 4–11)

## 2022-02-08 PROCEDURE — G8417 CALC BMI ABV UP PARAM F/U: HCPCS | Performed by: INTERNAL MEDICINE

## 2022-02-08 PROCEDURE — 1036F TOBACCO NON-USER: CPT | Performed by: INTERNAL MEDICINE

## 2022-02-08 PROCEDURE — 99214 OFFICE O/P EST MOD 30 MIN: CPT | Performed by: INTERNAL MEDICINE

## 2022-02-08 PROCEDURE — G8427 DOCREV CUR MEDS BY ELIG CLIN: HCPCS | Performed by: INTERNAL MEDICINE

## 2022-02-08 PROCEDURE — G8484 FLU IMMUNIZE NO ADMIN: HCPCS | Performed by: INTERNAL MEDICINE

## 2022-02-08 RX ORDER — NIFEDIPINE 60 MG/1
TABLET, FILM COATED, EXTENDED RELEASE ORAL
Qty: 90 TABLET | Refills: 0 | Status: SHIPPED | OUTPATIENT
Start: 2022-02-08 | End: 2022-03-15

## 2022-02-08 RX ORDER — TERAZOSIN 5 MG/1
5 CAPSULE ORAL NIGHTLY
Qty: 90 CAPSULE | Refills: 0 | Status: SHIPPED | OUTPATIENT
Start: 2022-02-08 | End: 2022-06-13 | Stop reason: SDUPTHER

## 2022-02-08 ASSESSMENT — ENCOUNTER SYMPTOMS
COUGH: 0
BLOOD IN STOOL: 0
CONSTIPATION: 0
WHEEZING: 0
SHORTNESS OF BREATH: 0
SINUS PAIN: 0
COLOR CHANGE: 0
ABDOMINAL PAIN: 0
CHEST TIGHTNESS: 0

## 2022-02-08 NOTE — PROGRESS NOTES
Snehal Amaya (:  1975) is a 48 y.o. male,New patient, here for evaluation of the following chief complaint(s):  No chief complaint on file. ASSESSMENT/PLAN:  1. HTN (hypertension), benign  Assessment & Plan:  Patient is much better this time the patient has mass quite a bit of weight and occasionally he is getting lower readings so we will get a try and take him off Garrett and with monitoring his blood pressure  Orders:  -     NIFEdipine (ADALAT CC) 60 MG extended release tablet; TAKE 1 TABLET BY MOUTH EVERY DAY, Disp-90 tablet, R-0DX Code Needed  . Normal  -     terazosin (HYTRIN) 5 MG capsule; Take 1 capsule by mouth nightly, Disp-90 capsule, R-0Normal  -     Comprehensive Metabolic Panel; Future  -     CBC Auto Differential; Future  -     MAGNESIUM; Future  2. Mixed hyperlipidemia  Assessment & Plan:  Patient is on cholesterol medications and will get a check his readings as well as his liver function  Orders:  -     Comprehensive Metabolic Panel; Future  -     Lipid Panel; Future  3. Moderate persistent asthma without complication  4. Mild intermittent asthma without complication  5. Prediabetes  -     Hemoglobin A1C; Future  6. Vitamin D deficiency  -     VITAMIN D 25 HYDROXY; Future      Return in about 3 months (around 2022) for Chr.dis.  Mx.         Subjective   SUBJECTIVE/OBJECTIVE:    Lab Review   Lab Results   Component Value Date     2021     2021     2021    K 4.4 2021    K 3.7 2021    K 4.5 2021    K 4.8 2021    K 3.9 2021    K 3.8 2021    CO2 20 2021    CO2 21 2021    CO2 20 2021    BUN 15 2021    BUN 18 2021    BUN 16 2021    CREATININE 1.0 2021    CREATININE 1.2 2021    CREATININE 1.3 2021    GLUCOSE 150 2021    GLUCOSE 98 2021    GLUCOSE 100 2021    CALCIUM 8.9 2021    CALCIUM 8.9 2021    CALCIUM 9.3 2021     Lab Objective   Physical Exam  Vitals and nursing note reviewed. Constitutional:       General: He is not in acute distress. Appearance: Normal appearance. HENT:      Head: Normocephalic and atraumatic. Right Ear: Tympanic membrane normal.      Left Ear: Tympanic membrane normal.      Nose: Nose normal.   Eyes:      Extraocular Movements: Extraocular movements intact. Conjunctiva/sclera: Conjunctivae normal.      Pupils: Pupils are equal, round, and reactive to light. Neck:      Vascular: No carotid bruit. Cardiovascular:      Rate and Rhythm: Normal rate and regular rhythm. Pulses: Normal pulses. Heart sounds: No murmur heard. Pulmonary:      Effort: Pulmonary effort is normal. No respiratory distress. Breath sounds: Normal breath sounds. Abdominal:      General: Abdomen is flat. Bowel sounds are normal. There is no distension. Palpations: Abdomen is soft. Tenderness: There is no abdominal tenderness. Musculoskeletal:         General: No swelling, tenderness or deformity. Cervical back: Normal range of motion and neck supple. No rigidity or tenderness. Right lower leg: No edema. Left lower leg: No edema. Lymphadenopathy:      Cervical: No cervical adenopathy. Skin:     Coloration: Skin is not jaundiced. Findings: No bruising, erythema or lesion. Neurological:      General: No focal deficit present. Mental Status: He is alert and oriented to person, place, and time. Cranial Nerves: No cranial nerve deficit. Motor: No weakness. Gait: Gait normal.            This dictation was generated by voice recognition computer software. Although all attempts are made to edit the dictation for accuracy, there may be errors in the transcription that are not intended. An electronic signature was used to authenticate this note.     --Chris Hall MD

## 2022-02-08 NOTE — ASSESSMENT & PLAN NOTE
Patient is much better this time the patient has mass quite a bit of weight and occasionally he is getting lower readings so we will get a try and take him off Garrett and with monitoring his blood pressure

## 2022-02-08 NOTE — ASSESSMENT & PLAN NOTE
Patient is on cholesterol medications and will get a check his readings as well as his liver function

## 2022-02-09 LAB
ESTIMATED AVERAGE GLUCOSE: 108.3 MG/DL
HBA1C MFR BLD: 5.4 %

## 2022-03-03 ENCOUNTER — TELEPHONE (OUTPATIENT)
Dept: INTERNAL MEDICINE CLINIC | Age: 47
End: 2022-03-03

## 2022-03-03 NOTE — TELEPHONE ENCOUNTER
----- Message from Janette Valdivia sent at 3/3/2022  2:45 PM EST -----  Subject: Refill Request    QUESTIONS  Name of Medication? predniSONE (DELTASONE) 20 MG tablet  Patient-reported dosage and instructions? Take 4 tablet for 3 and 3   tablets for 3 days   How many days do you have left? 0  Preferred Pharmacy? [id number]  Pharmacy phone number (if available)? [phone]  Additional Information for Provider? Patient stated rash on his stomach   has come back and itches .   ---------------------------------------------------------------------------  --------------  CALL BACK INFO  What is the best way for the office to contact you? OK to leave message on   voicemail  Preferred Call Back Phone Number?  [phone]

## 2022-03-04 NOTE — TELEPHONE ENCOUNTER
Please inform the patient we probably can switch him to a different medication but will have to be careful with his blood pressure not treated up too high

## 2022-03-04 NOTE — TELEPHONE ENCOUNTER
Spoke with patient. He will take Benadryl over the weekend. Dr. Tomer Jones did not want to switch BP meds over the weekend. Patient to call Monday or Tuesday to report how he is doing.

## 2022-03-04 NOTE — TELEPHONE ENCOUNTER
Spoke with patient. He thinks the Nifedipine is causing the rashes and itching. It all started after he started taking this med.

## 2022-03-04 NOTE — TELEPHONE ENCOUNTER
This is not a medication we usually refill and it is not for chronic use, the patient should be using the antihistamine on a regular basis

## 2022-03-14 ENCOUNTER — TELEPHONE (OUTPATIENT)
Dept: INTERNAL MEDICINE CLINIC | Age: 47
End: 2022-03-14

## 2022-03-14 DIAGNOSIS — I10 HTN (HYPERTENSION), BENIGN: Primary | ICD-10-CM

## 2022-03-14 NOTE — TELEPHONE ENCOUNTER
Message from 3/3 stated pt thought the Nifedipine was the cause and recommendation was to change BP med.

## 2022-03-14 NOTE — TELEPHONE ENCOUNTER
----- Message from Doretha Ying sent at 3/14/2022  9:22 AM EDT -----  Subject: Message to Provider    QUESTIONS  Information for Provider? pt is requesting a prescription for steroid   cream and hydrocortisone cream for rash on his stomach.  ---------------------------------------------------------------------------  --------------  CALL BACK INFO  What is the best way for the office to contact you? OK to leave message on   voicemail  Preferred Call Back Phone Number? [phone]  ---------------------------------------------------------------------------  --------------  SCRIPT ANSWERS  Relationship to Patient?  Self

## 2022-03-15 RX ORDER — NIFEDIPINE 30 MG/1
30 TABLET, FILM COATED, EXTENDED RELEASE ORAL DAILY
Qty: 30 TABLET | Refills: 3 | Status: SHIPPED | OUTPATIENT
Start: 2022-03-15 | End: 2022-06-13

## 2022-03-15 RX ORDER — LABETALOL 100 MG/1
100 TABLET, FILM COATED ORAL 2 TIMES DAILY
Qty: 60 TABLET | Refills: 3 | Status: SHIPPED | OUTPATIENT
Start: 2022-03-15 | End: 2022-07-06

## 2022-03-15 NOTE — TELEPHONE ENCOUNTER
Please inform the patient that we will not start the process of changing him from Procardia to a different medication, the first step will be to change his Procardia from 60 mg to 30 mg and a prescription has been sent to the pharmacy at the same time I want him to start taking labetalol 100 mg twice a day and continue to check his blood pressure and reported to me over the next couple of days

## 2022-05-03 ENCOUNTER — TELEPHONE (OUTPATIENT)
Dept: INTERNAL MEDICINE CLINIC | Age: 47
End: 2022-05-03

## 2022-05-03 NOTE — TELEPHONE ENCOUNTER
His kidney function slightly off so he needs to drink more fluids and then repeat his kidney function in about a week an order has been placed in the chart. (given to pt again from lab 2/9/22 per request)    ----- Message from [address] [place] [address] sent at 5/3/2022 10:23 AM EDT -----  Subject: Results Request    QUESTIONS  Which lab or imaging result is the patient calling about? labs egfr test   Which provider ordered the test?   At what location was the test performed? Date the test was performed? Additional Information for Provider?   ---------------------------------------------------------------------------  --------------  CALL BACK INFO  What is the best way for the office to contact you? OK to leave message on   voicemail  Preferred Call Back Phone Number? [phone]  ---------------------------------------------------------------------------  --------------  SCRIPT ANSWERS  Relationship to Patient?  Self

## 2022-06-07 DIAGNOSIS — R21 SKIN RASH: ICD-10-CM

## 2022-06-07 RX ORDER — CLOTRIMAZOLE AND BETAMETHASONE DIPROPIONATE 10; .64 MG/G; MG/G
CREAM TOPICAL
Qty: 45 G | Refills: 2 | Status: SHIPPED | OUTPATIENT
Start: 2022-06-07 | End: 2022-07-13 | Stop reason: SDUPTHER

## 2022-06-07 NOTE — TELEPHONE ENCOUNTER
Patient called in requesting refill of clotrimazole-betamethasone (LOTRISONE) 1-0.05 % cream to  [facility].      Last OV: 02/08/2022  Next OV: 06/29/2022

## 2022-06-13 ENCOUNTER — OFFICE VISIT (OUTPATIENT)
Dept: INTERNAL MEDICINE CLINIC | Age: 47
End: 2022-06-13
Payer: COMMERCIAL

## 2022-06-13 ENCOUNTER — HOSPITAL ENCOUNTER (OUTPATIENT)
Age: 47
Discharge: HOME | End: 2022-06-13
Payer: COMMERCIAL

## 2022-06-13 VITALS
SYSTOLIC BLOOD PRESSURE: 132 MMHG | DIASTOLIC BLOOD PRESSURE: 88 MMHG | BODY MASS INDEX: 29.81 KG/M2 | WEIGHT: 219.8 LBS | OXYGEN SATURATION: 99 % | HEART RATE: 68 BPM

## 2022-06-13 DIAGNOSIS — Z00.00 PREVENTATIVE HEALTH CARE: Primary | ICD-10-CM

## 2022-06-13 DIAGNOSIS — I10 HTN (HYPERTENSION), BENIGN: ICD-10-CM

## 2022-06-13 DIAGNOSIS — E78.2 MIXED HYPERLIPIDEMIA: ICD-10-CM

## 2022-06-13 DIAGNOSIS — Z12.11 COLON CANCER SCREENING: ICD-10-CM

## 2022-06-13 DIAGNOSIS — R21 RASH: ICD-10-CM

## 2022-06-13 DIAGNOSIS — M54.12 CERVICAL RADICULITIS: ICD-10-CM

## 2022-06-13 DIAGNOSIS — R73.03 PREDIABETES: ICD-10-CM

## 2022-06-13 DIAGNOSIS — N28.9 RENAL INSUFFICIENCY: ICD-10-CM

## 2022-06-13 PROBLEM — I16.0 HYPERTENSIVE URGENCY: Status: RESOLVED | Noted: 2021-06-20 | Resolved: 2022-06-13

## 2022-06-13 LAB
A/G RATIO: 1.5 (ref 1.1–2.2)
ALBUMIN SERPL-MCNC: 4.3 G/DL (ref 3.4–5)
ALP BLD-CCNC: 79 U/L (ref 40–129)
ALT SERPL-CCNC: 17 U/L (ref 10–40)
ANION GAP SERPL CALCULATED.3IONS-SCNC: 15 MMOL/L (ref 3–16)
AST SERPL-CCNC: 17 U/L (ref 15–37)
BASOPHILS ABSOLUTE: 0 K/UL (ref 0–0.2)
BASOPHILS RELATIVE PERCENT: 0.7 %
BILIRUB SERPL-MCNC: 0.4 MG/DL (ref 0–1)
BUN BLDV-MCNC: 17 MG/DL (ref 7–20)
CALCIUM SERPL-MCNC: 9.3 MG/DL (ref 8.3–10.6)
CHLORIDE BLD-SCNC: 106 MMOL/L (ref 99–110)
CHOLESTEROL, TOTAL: 195 MG/DL (ref 0–199)
CO2: 20 MMOL/L (ref 21–32)
CREAT SERPL-MCNC: 1.2 MG/DL (ref 0.9–1.3)
EOSINOPHILS ABSOLUTE: 0.9 K/UL (ref 0–0.6)
EOSINOPHILS RELATIVE PERCENT: 17.1 %
GFR AFRICAN AMERICAN: >60
GFR NON-AFRICAN AMERICAN: >60
GLUCOSE BLD-MCNC: 83 MG/DL (ref 70–99)
HCT VFR BLD CALC: 41.9 % (ref 40.5–52.5)
HDLC SERPL-MCNC: 58 MG/DL (ref 40–60)
HEMOGLOBIN: 14 G/DL (ref 13.5–17.5)
LDL CHOLESTEROL CALCULATED: 128 MG/DL
LYMPHOCYTES ABSOLUTE: 1.7 K/UL (ref 1–5.1)
LYMPHOCYTES RELATIVE PERCENT: 34.2 %
MCH RBC QN AUTO: 29.6 PG (ref 26–34)
MCHC RBC AUTO-ENTMCNC: 33.4 G/DL (ref 31–36)
MCV RBC AUTO: 88.7 FL (ref 80–100)
MONOCYTES ABSOLUTE: 0.4 K/UL (ref 0–1.3)
MONOCYTES RELATIVE PERCENT: 8.4 %
NEUTROPHILS ABSOLUTE: 2 K/UL (ref 1.7–7.7)
NEUTROPHILS RELATIVE PERCENT: 39.6 %
PDW BLD-RTO: 14.9 % (ref 12.4–15.4)
PLATELET # BLD: 297 K/UL (ref 135–450)
PMV BLD AUTO: 8.9 FL (ref 5–10.5)
POTASSIUM SERPL-SCNC: 3.9 MMOL/L (ref 3.5–5.1)
RBC # BLD: 4.72 M/UL (ref 4.2–5.9)
SODIUM BLD-SCNC: 141 MMOL/L (ref 136–145)
TOTAL PROTEIN: 7.2 G/DL (ref 6.4–8.2)
TRIGL SERPL-MCNC: 47 MG/DL (ref 0–150)
VLDLC SERPL CALC-MCNC: 9 MG/DL
WBC # BLD: 5 K/UL (ref 4–11)

## 2022-06-13 PROCEDURE — 80053 COMPREHEN METABOLIC PANEL: CPT

## 2022-06-13 PROCEDURE — 36415 COLL VENOUS BLD VENIPUNCTURE: CPT

## 2022-06-13 PROCEDURE — 85025 COMPLETE CBC W/AUTO DIFF WBC: CPT

## 2022-06-13 PROCEDURE — 1036F TOBACCO NON-USER: CPT | Performed by: INTERNAL MEDICINE

## 2022-06-13 PROCEDURE — 80061 LIPID PANEL: CPT

## 2022-06-13 PROCEDURE — G8417 CALC BMI ABV UP PARAM F/U: HCPCS | Performed by: INTERNAL MEDICINE

## 2022-06-13 PROCEDURE — 99214 OFFICE O/P EST MOD 30 MIN: CPT | Performed by: INTERNAL MEDICINE

## 2022-06-13 PROCEDURE — 83036 HEMOGLOBIN GLYCOSYLATED A1C: CPT

## 2022-06-13 PROCEDURE — 99396 PREV VISIT EST AGE 40-64: CPT | Performed by: INTERNAL MEDICINE

## 2022-06-13 PROCEDURE — G8427 DOCREV CUR MEDS BY ELIG CLIN: HCPCS | Performed by: INTERNAL MEDICINE

## 2022-06-13 RX ORDER — TERAZOSIN 5 MG/1
5 CAPSULE ORAL NIGHTLY
Qty: 90 CAPSULE | Refills: 0 | Status: SHIPPED | OUTPATIENT
Start: 2022-06-13 | End: 2022-09-07

## 2022-06-13 RX ORDER — NIFEDIPINE 30 MG/1
TABLET, FILM COATED, EXTENDED RELEASE ORAL
Qty: 30 TABLET | Refills: 3 | OUTPATIENT
Start: 2022-06-13

## 2022-06-13 RX ORDER — AMLODIPINE BESYLATE 5 MG/1
5 TABLET ORAL DAILY
Qty: 90 TABLET | Refills: 1 | Status: SHIPPED | OUTPATIENT
Start: 2022-06-13 | End: 2022-06-30 | Stop reason: SDUPTHER

## 2022-06-13 RX ORDER — PREDNISONE 20 MG/1
20 TABLET ORAL 2 TIMES DAILY
Qty: 10 TABLET | Refills: 0 | Status: SHIPPED | OUTPATIENT
Start: 2022-06-13 | End: 2022-09-01 | Stop reason: SDUPTHER

## 2022-06-13 ASSESSMENT — ENCOUNTER SYMPTOMS
CHEST TIGHTNESS: 0
ABDOMINAL PAIN: 0
SINUS PAIN: 0
SHORTNESS OF BREATH: 0
WHEEZING: 0
CONSTIPATION: 0
BLURRED VISION: 0
COLOR CHANGE: 0
COUGH: 0
BLOOD IN STOOL: 0

## 2022-06-13 ASSESSMENT — PATIENT HEALTH QUESTIONNAIRE - PHQ9
SUM OF ALL RESPONSES TO PHQ QUESTIONS 1-9: 0
SUM OF ALL RESPONSES TO PHQ9 QUESTIONS 1 & 2: 0
SUM OF ALL RESPONSES TO PHQ QUESTIONS 1-9: 0
2. FEELING DOWN, DEPRESSED OR HOPELESS: 0
1. LITTLE INTEREST OR PLEASURE IN DOING THINGS: 0
SUM OF ALL RESPONSES TO PHQ QUESTIONS 1-9: 0
SUM OF ALL RESPONSES TO PHQ QUESTIONS 1-9: 0

## 2022-06-13 NOTE — PROGRESS NOTES
Paco Salazar (:  1975) is a 48 y.o. male,New patient, here for evaluation of the following chief complaint(s):  Hypertension (review states feels nervous rn), Rash (apx 1 year ago new med - rash arrived, itching is better w decrease dose), Other (questions trying alternate medication.), and Shoulder Pain (neck L side and shoulder L ppainful apollo at HS)         ASSESSMENT/PLAN:  1. Preventative health care  2. HTN (hypertension), benign  -     terazosin (HYTRIN) 5 mg capsule; Take 1 capsule by mouth nightly, Disp-90 capsule, R-0Normal  -     amLODIPine (NORVASC) 5 MG tablet; Take 1 tablet by mouth daily, Disp-90 tablet, R-1Normal  -     Comprehensive Metabolic Panel; Future  -     CBC with Auto Differential; Future  3. Mixed hyperlipidemia  -     Comprehensive Metabolic Panel; Future  -     Lipid Panel; Future  4. Prediabetes  -     Hemoglobin A1C; Future  5. Renal insufficiency  6. Rash  -     predniSONE (DELTASONE) 20 MG tablet; Take 1 tablet by mouth 2 times daily for 5 days, Disp-10 tablet, R-0Normal  7. Colon cancer screening  -     Fecal DNA Colorectal cancer screening (Cologuard)  8. Cervical radiculitis  -     predniSONE (DELTASONE) 20 MG tablet;  Take 1 tablet by mouth 2 times daily for 5 days, Disp-10 tablet, R-0Normal  -     [facility]  Reviewed patient is preventive care at this point we will get a check his blood testing and make sure the patient does go for his colonoscopy or at least Cologuard    As per patient blood pressure after repeating it his blood pressure did improve but he is still at the upper limits of normal at this point and given the patient is having some problems still with nifedipine we will get a discontinue medication and switch him over to amlodipine at 5 mg if his blood pressure increases we will get a go ahead and increase him right away to 10 mg at that point    As far as the rash which is most likely it in the for the pain to get a put on prednisone for the next 5 days and see if that with the discontinuation of it being we will clear it  As for the patient cervical radicular pain we will get a start him on physical therapy he is also currently getting the prednisone for the rash which also can help with his neck discomfort he is also can use heat to help with that  Return in about 3 months (around 9/13/2022).          Subjective   SUBJECTIVE/OBJECTIVE:    Lab Review   Lab Results   Component Value Date     02/08/2022     12/01/2021     11/30/2021    K 4.2 02/08/2022    K 4.4 12/01/2021    K 3.7 11/30/2021    K 4.5 11/26/2021    K 3.9 11/25/2021    K 3.8 06/20/2021    CO2 21 02/08/2022    CO2 20 12/01/2021    CO2 21 11/30/2021    BUN 15 02/08/2022    BUN 15 12/01/2021    BUN 18 11/30/2021    CREATININE 1.4 02/08/2022    CREATININE 1.0 12/01/2021    CREATININE 1.2 11/30/2021    GLUCOSE 96 02/08/2022    GLUCOSE 150 12/01/2021    GLUCOSE 98 11/30/2021    CALCIUM 9.8 02/08/2022    CALCIUM 8.9 12/01/2021    CALCIUM 8.9 11/30/2021     Lab Results   Component Value Date    WBC 5.3 02/08/2022    WBC 4.0 12/01/2021    WBC 6.3 11/30/2021    HGB 14.7 02/08/2022    HGB 11.5 12/01/2021    HGB 12.0 11/30/2021    HCT 44.8 02/08/2022    HCT 34.2 12/01/2021    HCT 36.2 11/30/2021    MCV 90.3 02/08/2022    MCV 86.9 12/01/2021    MCV 88.0 11/30/2021     02/08/2022     12/01/2021     11/30/2021     Lab Results   Component Value Date    CHOL 249 02/08/2022    CHOL 204 08/09/2021    CHOL 199 06/21/2021    TRIG 68 02/08/2022    TRIG 77 08/09/2021    TRIG 67 06/21/2021    HDL 63 02/08/2022    HDL 44 08/09/2021    HDL 47 06/21/2021       Vitals 6/13/2022 6/13/2022 1/0/9339   SYSTOLIC 568 624 091   DIASTOLIC 88 90 82   Site Right Upper Arm - -   Pulse - 68 84   Temp - - -   Resp - - -   SpO2 - 99 98   Weight - 219 lb 12.8 oz 217 lb   Height - - 6' 0\"   Body mass index - - 29.43 kg/m2   Pain Level - - -   Some recent data might be hidden       Hypertension  This is a chronic problem. The current episode started more than 1 year ago. The problem is unchanged. The problem is uncontrolled. Associated symptoms include neck pain. Pertinent negatives include no anxiety, blurred vision, chest pain, headaches, palpitations, peripheral edema, PND, shortness of breath or sweats. Rash  This is a new problem. The current episode started more than 1 year ago. Pertinent negatives include no congestion, cough, fatigue or shortness of breath. Shoulder Pain   This is a new problem. The current episode started 1 to 4 weeks ago. The problem occurs constantly. The problem has been unchanged. Neck Pain   This is a new problem. The current episode started 1 to 4 weeks ago. Pertinent negatives include no chest pain, headaches or weakness. Review of Systems   Constitutional: Negative for activity change, appetite change, fatigue and unexpected weight change. HENT: Negative for congestion, ear pain and sinus pain. Eyes: Negative for blurred vision. Respiratory: Negative for cough, chest tightness, shortness of breath and wheezing. Cardiovascular: Negative for chest pain, palpitations and PND. Gastrointestinal: Negative for abdominal pain, blood in stool and constipation. Endocrine: Negative for cold intolerance, heat intolerance and polyuria. Genitourinary: Negative for dysuria, frequency and urgency. Musculoskeletal: Positive for neck pain. Negative for arthralgias and myalgias. Skin: Positive for rash. Negative for color change. Neurological: Negative for weakness and headaches. Hematological: Negative for adenopathy. Does not bruise/bleed easily. Psychiatric/Behavioral: Negative for agitation, dysphoric mood and sleep disturbance. Objective   Physical Exam  Vitals and nursing note reviewed. Constitutional:       General: He is not in acute distress. Appearance: Normal appearance.    HENT:      Head: Normocephalic and atraumatic. Right Ear: Tympanic membrane normal.      Left Ear: Tympanic membrane normal.      Nose: Nose normal.   Eyes:      Extraocular Movements: Extraocular movements intact. Conjunctiva/sclera: Conjunctivae normal.      Pupils: Pupils are equal, round, and reactive to light. Neck:      Vascular: No carotid bruit. Cardiovascular:      Rate and Rhythm: Normal rate and regular rhythm. Pulses: Normal pulses. Heart sounds: No murmur heard. Pulmonary:      Effort: Pulmonary effort is normal. No respiratory distress. Breath sounds: Normal breath sounds. Abdominal:      General: Abdomen is flat. Bowel sounds are normal. There is no distension. Palpations: Abdomen is soft. Tenderness: There is no abdominal tenderness. Musculoskeletal:         General: No swelling, tenderness or deformity. Cervical back: Normal range of motion and neck supple. No rigidity or tenderness. Right lower leg: No edema. Left lower leg: No edema. Lymphadenopathy:      Cervical: No cervical adenopathy. Skin:     Coloration: Skin is not jaundiced. Findings: No bruising, erythema or lesion. Neurological:      General: No focal deficit present. Mental Status: He is alert and oriented to person, place, and time. Cranial Nerves: No cranial nerve deficit. Motor: No weakness. Gait: Gait normal.            This dictation was generated by voice recognition computer software. Although all attempts are made to edit the dictation for accuracy, there may be errors in the transcription that are not intended. An electronic signature was used to authenticate this note.     --Chaim Bay MD

## 2022-06-14 ENCOUNTER — TELEPHONE (OUTPATIENT)
Dept: INTERNAL MEDICINE CLINIC | Age: 47
End: 2022-06-14

## 2022-06-14 LAB
ESTIMATED AVERAGE GLUCOSE: 111.2 MG/DL
HBA1C MFR BLD: 5.5 %

## 2022-06-14 NOTE — TELEPHONE ENCOUNTER
Patient called back and states he is aware to discontinue NIFEdipine (ADALAT CC) 30 MG extended release tablet. Patient also requested results of lab work he completed yesterday.

## 2022-06-15 ENCOUNTER — TELEPHONE (OUTPATIENT)
Dept: INTERNAL MEDICINE CLINIC | Age: 47
End: 2022-06-15

## 2022-06-15 DIAGNOSIS — I10 HTN (HYPERTENSION), BENIGN: ICD-10-CM

## 2022-06-15 NOTE — TELEPHONE ENCOUNTER
Patient is calling regarding his blood pressure. He states yesterday Dr Freeda Osgood advised him to increase amlodipine from 5 mg to 10 mg daily. His blood pressure this morning is 161/87. Patient requesting a call back.          Documentation

## 2022-06-15 NOTE — TELEPHONE ENCOUNTER
Patient is calling regarding his blood pressure. He states yesterday Dr Wendy Stephen advised him to increase amlodipine from 5 mg to 10 mg daily. His blood pressure this morning is 161/87. Patient requesting a call back.

## 2022-06-15 NOTE — TELEPHONE ENCOUNTER
Please advise the patient that even though his blood pressure is somewhat higher than what it usually is since we switched him I would advise him to stay on the same medication for the time being at least for couple more days and to monitor it as long as it is in that range he should be okay but eventually we will get I have to try and bring him down further and we may need to add another medication

## 2022-06-16 RX ORDER — AMLODIPINE BESYLATE 5 MG/1
5 TABLET ORAL DAILY
Qty: 90 TABLET | Refills: 1 | Status: CANCELLED | OUTPATIENT
Start: 2022-06-16

## 2022-06-24 ENCOUNTER — TELEPHONE (OUTPATIENT)
Dept: INTERNAL MEDICINE CLINIC | Age: 47
End: 2022-06-24

## 2022-06-24 NOTE — TELEPHONE ENCOUNTER
[employer] ---they need copy of pt insurance card ---of the cologuard test---please fax to [phone]. Thanks.

## 2022-06-29 ENCOUNTER — TELEPHONE (OUTPATIENT)
Dept: INTERNAL MEDICINE CLINIC | Age: 47
End: 2022-06-29

## 2022-06-29 DIAGNOSIS — I10 HTN (HYPERTENSION), BENIGN: ICD-10-CM

## 2022-06-29 NOTE — TELEPHONE ENCOUNTER
Patient states Dr Jazmín Roman prescribed amlodipine 5 mg qd for him and then increased his dose to 10 mg qd. He states [facility] not [facility] medication at this time and since he had to double it he will be out of the medication. He asked if Dr Jazmín Roman could send new prescription or call pharmacy. I did let patient know that Dr Jazmín Roman is not in the office this afternoon and he states he has medication for today and tomorrow.

## 2022-06-29 NOTE — TELEPHONE ENCOUNTER
I dont see where his BP medication was increased to 10 mg. It looks like he was told to stay on the same medication for the time being and as long as it was in that range. Hold for Dr Ronny Feliz.

## 2022-06-30 RX ORDER — AMLODIPINE BESYLATE 10 MG/1
10 TABLET ORAL DAILY
Qty: 30 TABLET | Refills: 2 | Status: SHIPPED | OUTPATIENT
Start: 2022-06-30 | End: 2022-08-05

## 2022-07-06 DIAGNOSIS — I10 HTN (HYPERTENSION), BENIGN: ICD-10-CM

## 2022-07-06 RX ORDER — LABETALOL 100 MG/1
TABLET, FILM COATED ORAL
Qty: 60 TABLET | Refills: 3 | Status: SHIPPED | OUTPATIENT
Start: 2022-07-06

## 2022-07-13 DIAGNOSIS — R21 SKIN RASH: ICD-10-CM

## 2022-07-13 RX ORDER — CLOTRIMAZOLE AND BETAMETHASONE DIPROPIONATE 10; .64 MG/G; MG/G
CREAM TOPICAL
Qty: 45 G | Refills: 2 | Status: SHIPPED | OUTPATIENT
Start: 2022-07-13 | End: 2022-09-01 | Stop reason: SDUPTHER

## 2022-07-13 NOTE — TELEPHONE ENCOUNTER
----- Message from Jeremy Mojica sent at 7/13/2022 10:58 AM EDT -----  Subject: Refill Request    QUESTIONS  Name of Medication? clotrimazole-betamethasone (LOTRISONE) 1-0.05 % cream  Patient-reported dosage and instructions? As needed  How many days do you have left? 0  [id number]  Pharmacy phone number (if available)? [phone]  Additional Information for Provider? Pt states the same rash has come back   and he is requesting another Rx to help. Please advise.  ---------------------------------------------------------------------------  --------------  CALL BACK INFO  What is the best way for the office to contact you? OK to leave message on   voicemail  Preferred Call Back Phone Number? [phone]  ---------------------------------------------------------------------------  --------------  SCRIPT ANSWERS  Relationship to Patient?  Self

## 2022-07-15 ENCOUNTER — TELEPHONE (OUTPATIENT)
Dept: INTERNAL MEDICINE CLINIC | Age: 47
End: 2022-07-15

## 2022-07-15 NOTE — TELEPHONE ENCOUNTER
----- Message from Mary Reynolds sent at 7/14/2022  3:52 PM EDT -----  Subject: Message to Provider    QUESTIONS  Information for Provider? pt has a rash on his stomach and would like a   prescription for Hydrocortisone 2.5% ointment. He said this had worked   before.  ---------------------------------------------------------------------------  --------------  Evelyn VILLA  [phone]; OK to leave message on voicemail  ---------------------------------------------------------------------------  --------------  SCRIPT ANSWERS  Relationship to Patient?  Self

## 2022-08-05 DIAGNOSIS — I10 HTN (HYPERTENSION), BENIGN: ICD-10-CM

## 2022-08-05 RX ORDER — AMLODIPINE BESYLATE 10 MG/1
5 TABLET ORAL DAILY
Qty: 30 TABLET | Refills: 0 | Status: SHIPPED | OUTPATIENT
Start: 2022-08-05 | End: 2022-08-22 | Stop reason: SDUPTHER

## 2022-08-05 NOTE — TELEPHONE ENCOUNTER
Advised the patient that he can cut his dose in half and keep measuring his blood pressure at least once a day

## 2022-08-05 NOTE — TELEPHONE ENCOUNTER
----- Message from Adrianna Spencer sent at 8/5/2022 12:07 PM EDT -----  Subject: Medication Problem    Medication: amLODIPine (NORVASC) 10 MG tablet  Dosage: 1 a day   Ordering Provider: John Panchal    Question/Problem: Patient would like to be put back on the 5 mg tablet due   to blood pressure is running low 107/70. Please advise?      [facility] [id number] [facility] [address] [facility] [phone]    ---------------------------------------------------------------------------  --------------  Ac VILLA  [phone]; OK to leave message on voicemail  ---------------------------------------------------------------------------  --------------    SCRIPT ANSWERS  Relationship to Patient: Self

## 2022-08-08 RX ORDER — IBUPROFEN 600 MG/1
TABLET ORAL
Qty: 90 TABLET | Refills: 5 | OUTPATIENT
Start: 2022-08-08

## 2022-08-22 DIAGNOSIS — I10 HTN (HYPERTENSION), BENIGN: ICD-10-CM

## 2022-08-22 RX ORDER — AMLODIPINE BESYLATE 10 MG/1
5 TABLET ORAL DAILY
Qty: 30 TABLET | Refills: 0 | Status: SHIPPED | OUTPATIENT
Start: 2022-08-22 | End: 2022-09-27

## 2022-08-22 NOTE — TELEPHONE ENCOUNTER
----- Message from Manish Jeffache sent at 8/22/2022  8:44 AM EDT -----  Subject: Refill Request    QUESTIONS  Name of Medication? amLODIPine (NORVASC) 10 MG tablet  Patient-reported dosage and instructions? Take 0.5 tablets by mouth in the   morning. How many days do you have left? 0  [id number]  Pharmacy phone number (if available)? [phone]  Additional Information for Provider? He has lost his Amlodipine   prescriptions yesterday 08/21. Please call back advise.  ---------------------------------------------------------------------------  --------------,  Name of Medication? hydrocortisone 2.5 % cream  Patient-reported dosage and instructions? Apply topically 2 times daily. How many days do you have left? 0  [id number]  Pharmacy phone number (if available)? [phone]  ---------------------------------------------------------------------------  --------------  Celeste Traore INFO  What is the best way for the office to contact you? OK to leave message on   voicemail  Preferred Call Back Phone Number? [phone]  ---------------------------------------------------------------------------  --------------  SCRIPT ANSWERS  Relationship to Patient?  Self

## 2022-08-31 ENCOUNTER — TELEPHONE (OUTPATIENT)
Dept: INTERNAL MEDICINE CLINIC | Age: 47
End: 2022-08-31

## 2022-08-31 DIAGNOSIS — R21 RASH: ICD-10-CM

## 2022-08-31 DIAGNOSIS — R21 SKIN RASH: ICD-10-CM

## 2022-08-31 DIAGNOSIS — M54.12 CERVICAL RADICULITIS: ICD-10-CM

## 2022-08-31 RX ORDER — PREDNISONE 20 MG/1
TABLET ORAL
Qty: 10 TABLET | Refills: 0 | OUTPATIENT
Start: 2022-08-31

## 2022-08-31 NOTE — TELEPHONE ENCOUNTER
----- Message from Claudean Poland sent at 8/31/2022  4:52 PM EDT -----  Subject: Message to Provider    QUESTIONS  Information for Provider? Patient states rash has came back onto stomach   and is requesting a refill of predniSONE (DELTASONE) 20 MG tablet  ---------------------------------------------------------------------------  --------------  Sheryl VILLA  [phone]; OK to leave message on voicemail  ---------------------------------------------------------------------------  --------------  SCRIPT ANSWERS  Relationship to Patient?  Self

## 2022-08-31 NOTE — TELEPHONE ENCOUNTER
----- Message from Ottoniel Goldberg sent at 8/31/2022 12:43 PM EDT -----  Subject: Refill Request    QUESTIONS  Name of Medication? predniSONE (DELTASONE) 20 MG tablet  Patient-reported dosage and instructions? Take 1 tablet by mouth 2 times   daily for 5 days  How many days do you have left? 0  Preferred Pharmacy? [facility]/PH[facility]ACY #[id number]  Pharmacy phone number (if available)? [phone]  Additional Information for Provider? the pt states that the rash on his   stomach is back and would like a refill.   ---------------------------------------------------------------------------  --------------  CALL BACK INFO  What is the best way for the office to contact you? OK to leave message on   voicemail  Preferred Call Back Phone Number? [phone]  ---------------------------------------------------------------------------  --------------  SCRIPT ANSWERS  Relationship to Patient?  Self

## 2022-08-31 NOTE — TELEPHONE ENCOUNTER
A message has already been sent re:this multiple times. From pharmacy, thru [facility] and now a phone call from patient. This will have to wait for Dr. Sinhg Estrada tomorrow to advise on. Mary Monreal

## 2022-09-01 DIAGNOSIS — M54.12 CERVICAL RADICULITIS: ICD-10-CM

## 2022-09-01 DIAGNOSIS — R21 RASH: ICD-10-CM

## 2022-09-01 RX ORDER — PREDNISONE 20 MG/1
20 TABLET ORAL 2 TIMES DAILY
Qty: 10 TABLET | Refills: 0 | Status: SHIPPED | OUTPATIENT
Start: 2022-09-01 | End: 2022-09-06

## 2022-09-01 RX ORDER — CLOTRIMAZOLE AND BETAMETHASONE DIPROPIONATE 10; .64 MG/G; MG/G
CREAM TOPICAL
Qty: 45 G | Refills: 2 | Status: SHIPPED | OUTPATIENT
Start: 2022-09-01

## 2022-09-07 DIAGNOSIS — I10 HTN (HYPERTENSION), BENIGN: ICD-10-CM

## 2022-09-07 RX ORDER — TERAZOSIN 5 MG/1
CAPSULE ORAL
Qty: 90 CAPSULE | Refills: 0 | Status: SHIPPED | OUTPATIENT
Start: 2022-09-07

## 2022-09-27 DIAGNOSIS — I10 HTN (HYPERTENSION), BENIGN: ICD-10-CM

## 2022-09-27 RX ORDER — AMLODIPINE BESYLATE 10 MG/1
TABLET ORAL
Qty: 30 TABLET | Refills: 2 | Status: SHIPPED | OUTPATIENT
Start: 2022-09-27

## 2022-11-06 DIAGNOSIS — I10 HTN (HYPERTENSION), BENIGN: ICD-10-CM

## 2022-11-07 ENCOUNTER — TELEPHONE (OUTPATIENT)
Dept: INTERNAL MEDICINE CLINIC | Age: 47
End: 2022-11-07

## 2022-11-07 RX ORDER — AMLODIPINE BESYLATE 5 MG/1
TABLET ORAL
Qty: 30 TABLET | Refills: 0 | Status: SHIPPED | OUTPATIENT
Start: 2022-11-07 | End: 2022-12-01 | Stop reason: SDUPTHER

## 2022-11-07 RX ORDER — LABETALOL 100 MG/1
TABLET, FILM COATED ORAL
Qty: 60 TABLET | Refills: 0 | Status: SHIPPED | OUTPATIENT
Start: 2022-11-07

## 2022-11-07 NOTE — TELEPHONE ENCOUNTER
Spoke with spouse, states patient is working/there near her. Refused ER, urgent care w/ c/o Headache and no sleep x 10 days with frequent urination. Ref appt today request appt for day off. Scheduled for Thursday.

## 2022-11-30 ENCOUNTER — TELEPHONE (OUTPATIENT)
Dept: INTERNAL MEDICINE CLINIC | Age: 47
End: 2022-11-30

## 2022-11-30 DIAGNOSIS — I10 HTN (HYPERTENSION), BENIGN: ICD-10-CM

## 2022-11-30 NOTE — TELEPHONE ENCOUNTER
----- Message from [facility] sent at 11/30/2022  2:14 PM EST -----  Subject: Refill Request    QUESTIONS  Name of Medication? amLODIPine (NORVASC) 10 MG tablet  Patient-reported dosage and instructions? 5 mgs 1 x a day   How many days do you have left? 0  Preferred Pharmacy? [facility]/PHARMACY #[id number]  Pharmacy phone number (if available)? [phone]  Additional Information for Provider? Pt blood pressure 105-110/65-70 pt   said he needs to only taking 5 mg. Pt said the 10 mgs is too strong making   his blood pressure go low. Pt is not able to cut them because they are too   little.   ---------------------------------------------------------------------------  --------------  CALL BACK INFO  What is the best way for the office to contact you? OK to leave message on   voicemail  Preferred Call Back Phone Number? [phone]  ---------------------------------------------------------------------------  --------------  SCRIPT ANSWERS  Relationship to Patient?  Self

## 2022-12-01 RX ORDER — AMLODIPINE BESYLATE 5 MG/1
TABLET ORAL
Qty: 30 TABLET | Refills: 0 | Status: SHIPPED | OUTPATIENT
Start: 2022-12-01

## 2022-12-05 DIAGNOSIS — I10 HTN (HYPERTENSION), BENIGN: ICD-10-CM

## 2022-12-05 RX ORDER — LABETALOL 100 MG/1
TABLET, FILM COATED ORAL
Qty: 60 TABLET | Refills: 0 | Status: SHIPPED | OUTPATIENT
Start: 2022-12-05

## 2022-12-11 ENCOUNTER — HOSPITAL ENCOUNTER (EMERGENCY)
Age: 47
Discharge: HOME | End: 2022-12-11
Payer: OTHER MISCELLANEOUS

## 2022-12-11 ENCOUNTER — APPOINTMENT (OUTPATIENT)
Dept: CT IMAGING | Age: 47
End: 2022-12-11
Payer: OTHER MISCELLANEOUS

## 2022-12-11 VITALS
HEIGHT: 72 IN | DIASTOLIC BLOOD PRESSURE: 109 MMHG | SYSTOLIC BLOOD PRESSURE: 170 MMHG | RESPIRATION RATE: 18 BRPM | TEMPERATURE: 98.2 F | HEART RATE: 69 BPM | BODY MASS INDEX: 29.93 KG/M2 | WEIGHT: 221 LBS | OXYGEN SATURATION: 99 %

## 2022-12-11 DIAGNOSIS — S09.90XA CLOSED HEAD INJURY, INITIAL ENCOUNTER: ICD-10-CM

## 2022-12-11 DIAGNOSIS — S16.1XXA STRAIN OF NECK MUSCLE, INITIAL ENCOUNTER: ICD-10-CM
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
## 2022-12-11 PROCEDURE — 70450 CT HEAD/BRAIN W/O DYE: CPT

## 2022-12-11 PROCEDURE — 6370000000 HC RX 637 (ALT 250 FOR IP): Performed by: PHYSICIAN ASSISTANT

## 2022-12-11 PROCEDURE — 72125 CT NECK SPINE W/O DYE: CPT

## 2022-12-11 PROCEDURE — 96372 THER/PROPH/DIAG INJ SC/IM: CPT

## 2022-12-11 PROCEDURE — 6360000002 HC RX W HCPCS: Performed by: PHYSICIAN ASSISTANT

## 2022-12-11 PROCEDURE — 99284 EMERGENCY DEPT VISIT MOD MDM: CPT

## 2022-12-11 RX ORDER — KETOROLAC TROMETHAMINE 30 MG/ML
30 INJECTION, SOLUTION INTRAMUSCULAR; INTRAVENOUS ONCE
Status: COMPLETED | OUTPATIENT
Start: 2022-12-11 | End: 2022-12-11

## 2022-12-11 RX ORDER — NAPROXEN 500 MG/1
500 TABLET ORAL 2 TIMES DAILY PRN
Qty: 20 TABLET | Refills: 0 | Status: SHIPPED | OUTPATIENT
Start: 2022-12-11

## 2022-12-11 RX ORDER — LABETALOL 200 MG/1
100 TABLET, FILM COATED ORAL ONCE
Status: COMPLETED | OUTPATIENT
Start: 2022-12-11 | End: 2022-12-11

## 2022-12-11 RX ORDER — CYCLOBENZAPRINE HCL 10 MG
10 TABLET ORAL 3 TIMES DAILY PRN
Qty: 20 TABLET | Refills: 0 | Status: SHIPPED | OUTPATIENT
Start: 2022-12-11

## 2022-12-11 RX ADMIN — KETOROLAC TROMETHAMINE 30 MG: 30 INJECTION, SOLUTION INTRAMUSCULAR; INTRAVENOUS at 11:01

## 2022-12-11 RX ADMIN — LABETALOL HYDROCHLORIDE 100 MG: 200 TABLET, FILM COATED ORAL at 11:00

## 2022-12-11 ASSESSMENT — PAIN DESCRIPTION - LOCATION
LOCATION: GENERALIZED
LOCATION: NECK;SHOULDER

## 2022-12-11 ASSESSMENT — PAIN DESCRIPTION - ORIENTATION: ORIENTATION: RIGHT

## 2022-12-11 ASSESSMENT — PAIN - FUNCTIONAL ASSESSMENT: PAIN_FUNCTIONAL_ASSESSMENT: 0-10

## 2022-12-11 ASSESSMENT — PAIN DESCRIPTION - PAIN TYPE: TYPE: ACUTE PAIN

## 2022-12-11 ASSESSMENT — PAIN SCALES - GENERAL: PAINLEVEL_OUTOF10: 8

## 2022-12-11 ASSESSMENT — PAIN DESCRIPTION - DESCRIPTORS: DESCRIPTORS: ACHING
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
## 2022-12-28 ENCOUNTER — TELEPHONE (OUTPATIENT)
Dept: INTERNAL MEDICINE CLINIC | Age: 47
End: 2022-12-28

## 2022-12-28 NOTE — TELEPHONE ENCOUNTER
----- Message from Rdaha Douglas sent at 12/28/2022 11:26 AM EST -----  Subject: Refill Request    QUESTIONS  Name of Medication? Other - ibuprofen 800mg  Patient-reported dosage and instructions? as needed  How many days do you have left? 0  [id number]  Pharmacy phone number (if available)? [phone]  ---------------------------------------------------------------------------  --------------  Cori VILLA  What is the best way for the office to contact you? OK to leave message on   voicemail  Preferred Call Back Phone Number? [phone]  ---------------------------------------------------------------------------  --------------  SCRIPT ANSWERS  Relationship to Patient?  Self

## 2022-12-29 NOTE — TELEPHONE ENCOUNTER
Please advise patient that he needs to make an appointment he has not skipped his last 1 and will need to be reevaluated

## 2023-01-06 DIAGNOSIS — I10 HTN (HYPERTENSION), BENIGN: ICD-10-CM

## 2023-01-06 RX ORDER — LABETALOL 100 MG/1
TABLET, FILM COATED ORAL
Qty: 60 TABLET | Refills: 0 | Status: SHIPPED | OUTPATIENT
Start: 2023-01-06

## 2023-01-06 RX ORDER — AMLODIPINE BESYLATE 10 MG/1
TABLET ORAL
Qty: 30 TABLET | Refills: 2 | Status: SHIPPED | OUTPATIENT
Start: 2023-01-06

## 2023-01-25 DIAGNOSIS — I10 HTN (HYPERTENSION), BENIGN: ICD-10-CM

## 2023-01-25 RX ORDER — AMLODIPINE BESYLATE 5 MG/1
TABLET ORAL
Qty: 30 TABLET | Refills: 0 | OUTPATIENT
Start: 2023-01-25

## 2023-02-20 ENCOUNTER — TELEPHONE (OUTPATIENT)
Dept: INTERNAL MEDICINE CLINIC | Age: 48
End: 2023-02-20

## 2023-02-20 DIAGNOSIS — I10 HTN (HYPERTENSION), BENIGN: ICD-10-CM

## 2023-02-20 RX ORDER — AMLODIPINE BESYLATE 5 MG/1
TABLET ORAL
Qty: 30 TABLET | Refills: 0 | OUTPATIENT
Start: 2023-02-20

## 2023-02-20 RX ORDER — AMLODIPINE BESYLATE 5 MG/1
TABLET ORAL
Qty: 30 TABLET | Refills: 0 | Status: CANCELLED | OUTPATIENT
Start: 2023-02-20

## 2023-02-20 NOTE — TELEPHONE ENCOUNTER
Pt calling requesting refill of amLODIPine (NORVASC) 5 MG tablet. Last written 1/6/23 (10 MG, taking 5 MG now)  Last OV 6/13/22  Next OV 3/2/23    Please send to [facility]. Pt states he is out of medication.

## 2023-02-20 NOTE — TELEPHONE ENCOUNTER
Called patient to have him call back with most recent BP if he is able to take at home.  had sent over different dosing to pharmacy and patient requesting old dosing.  Patient has 8 canceled appointments and needs to be seen in office to continue with medication refills

## 2023-05-12 ENCOUNTER — TELEPHONE (OUTPATIENT)
Dept: INTERNAL MEDICINE CLINIC | Age: 48
End: 2023-05-12

## 2023-05-12 NOTE — TELEPHONE ENCOUNTER
----- Message from [facility] sent at 5/12/2023 12:53 PM EDT -----  Subject: Refill Request    QUESTIONS  Name of Medication? amLODIPine (NORVASC) 5 MG tablet  Patient-reported dosage and instructions? 5 MG Tablet:// Take one tablet   once daily   How many days do you have left? 0  Preferred Pharmacy? [id number]  Pharmacy phone number (if available)? [phone]  Additional Information for Provider? Pt has appointment set for 05/17/23. He is out of his medication. He is requesting enough medication to get him   to his appointment. Please return his call.  ---------------------------------------------------------------------------  --------------  CALL BACK INFO  What is the best way for the office to contact you? OK to leave message on   voicemail  Preferred Call Back Phone Number? [phone]  ---------------------------------------------------------------------------  --------------  SCRIPT ANSWERS  Relationship to Patient?  Self

## 2023-05-12 NOTE — TELEPHONE ENCOUNTER
Last OV: 6/13/2022(PCP)  Next OV: 5/17/2023(PCP office)       Patient informed 6 month supply was sent in by Kandis Scales in February 2023 needs to reach out to pharmacy to fill. Patient instructed he needs to keep follow up appt with office if  he chooses to have PCP take over medication, confirm understanding.

## 2023-08-22 ENCOUNTER — TELEPHONE (OUTPATIENT)
Dept: INTERNAL MEDICINE CLINIC | Age: 48
End: 2023-08-22

## 2023-08-22 ENCOUNTER — PATIENT MESSAGE (OUTPATIENT)
Dept: INTERNAL MEDICINE CLINIC | Age: 48
End: 2023-08-22

## 2023-08-22 ENCOUNTER — NURSE ONLY (OUTPATIENT)
Dept: INTERNAL MEDICINE CLINIC | Age: 48
End: 2023-08-22

## 2023-08-22 ENCOUNTER — OFFICE VISIT (OUTPATIENT)
Dept: INTERNAL MEDICINE CLINIC | Age: 48
End: 2023-08-22
Payer: COMMERCIAL

## 2023-08-22 VITALS
BODY MASS INDEX: 30.65 KG/M2 | WEIGHT: 226 LBS | DIASTOLIC BLOOD PRESSURE: 108 MMHG | OXYGEN SATURATION: 97 % | HEART RATE: 90 BPM | SYSTOLIC BLOOD PRESSURE: 192 MMHG

## 2023-08-22 VITALS — SYSTOLIC BLOOD PRESSURE: 162 MMHG | DIASTOLIC BLOOD PRESSURE: 118 MMHG

## 2023-08-22 DIAGNOSIS — N40.1 BPH ASSOCIATED WITH NOCTURIA: ICD-10-CM

## 2023-08-22 DIAGNOSIS — R73.03 PREDIABETES: ICD-10-CM

## 2023-08-22 DIAGNOSIS — Z00.00 PREVENTATIVE HEALTH CARE: ICD-10-CM

## 2023-08-22 DIAGNOSIS — Z12.11 COLON CANCER SCREENING: ICD-10-CM

## 2023-08-22 DIAGNOSIS — R35.1 BPH ASSOCIATED WITH NOCTURIA: ICD-10-CM

## 2023-08-22 DIAGNOSIS — R21 RASH: ICD-10-CM

## 2023-08-22 DIAGNOSIS — Z00.00 PREVENTATIVE HEALTH CARE: Primary | ICD-10-CM

## 2023-08-22 DIAGNOSIS — E66.9 OBESITY (BMI 30.0-34.9): ICD-10-CM

## 2023-08-22 DIAGNOSIS — E78.2 MIXED HYPERLIPIDEMIA: ICD-10-CM

## 2023-08-22 DIAGNOSIS — I10 HTN (HYPERTENSION), BENIGN: ICD-10-CM

## 2023-08-22 DIAGNOSIS — Z13.220 SCREENING FOR CHOLESTEROL LEVEL: ICD-10-CM

## 2023-08-22 DIAGNOSIS — N28.9 RENAL INSUFFICIENCY: ICD-10-CM

## 2023-08-22 PROBLEM — U07.1 COVID-19: Status: RESOLVED | Noted: 2021-11-30 | Resolved: 2023-08-22

## 2023-08-22 LAB
BASOPHILS # BLD: 0 K/UL (ref 0–0.2)
BASOPHILS NFR BLD: 1 %
DEPRECATED RDW RBC AUTO: 15.5 % (ref 12.4–15.4)
EOSINOPHIL # BLD: 0.3 K/UL (ref 0–0.6)
EOSINOPHIL NFR BLD: 7.3 %
HCT VFR BLD AUTO: 45.5 % (ref 40.5–52.5)
HGB BLD-MCNC: 15.3 G/DL (ref 13.5–17.5)
LYMPHOCYTES # BLD: 1.5 K/UL (ref 1–5.1)
LYMPHOCYTES NFR BLD: 34.8 %
MCH RBC QN AUTO: 29.6 PG (ref 26–34)
MCHC RBC AUTO-ENTMCNC: 33.6 G/DL (ref 31–36)
MCV RBC AUTO: 88 FL (ref 80–100)
MONOCYTES # BLD: 0.4 K/UL (ref 0–1.3)
MONOCYTES NFR BLD: 9.9 %
NEUTROPHILS # BLD: 2.1 K/UL (ref 1.7–7.7)
NEUTROPHILS NFR BLD: 47 %
PLATELET # BLD AUTO: 309 K/UL (ref 135–450)
PMV BLD AUTO: 9.5 FL (ref 5–10.5)
RBC # BLD AUTO: 5.17 M/UL (ref 4.2–5.9)
WBC # BLD AUTO: 4.4 K/UL (ref 4–11)

## 2023-08-22 PROCEDURE — 3080F DIAST BP >= 90 MM HG: CPT | Performed by: INTERNAL MEDICINE

## 2023-08-22 PROCEDURE — 3077F SYST BP >= 140 MM HG: CPT | Performed by: INTERNAL MEDICINE

## 2023-08-22 PROCEDURE — G8427 DOCREV CUR MEDS BY ELIG CLIN: HCPCS | Performed by: INTERNAL MEDICINE

## 2023-08-22 PROCEDURE — 1036F TOBACCO NON-USER: CPT | Performed by: INTERNAL MEDICINE

## 2023-08-22 PROCEDURE — 99214 OFFICE O/P EST MOD 30 MIN: CPT | Performed by: INTERNAL MEDICINE

## 2023-08-22 PROCEDURE — G8417 CALC BMI ABV UP PARAM F/U: HCPCS | Performed by: INTERNAL MEDICINE

## 2023-08-22 PROCEDURE — 3017F COLORECTAL CA SCREEN DOC REV: CPT | Performed by: INTERNAL MEDICINE

## 2023-08-22 PROCEDURE — 99396 PREV VISIT EST AGE 40-64: CPT | Performed by: INTERNAL MEDICINE

## 2023-08-22 RX ORDER — AMLODIPINE BESYLATE 5 MG/1
TABLET ORAL
Qty: 90 TABLET | Refills: 2 | Status: SHIPPED | OUTPATIENT
Start: 2023-08-22

## 2023-08-22 RX ORDER — CLOTRIMAZOLE AND BETAMETHASONE DIPROPIONATE 10; .64 MG/G; MG/G
CREAM TOPICAL
Qty: 45 G | Refills: 1 | Status: SHIPPED | OUTPATIENT
Start: 2023-08-22

## 2023-08-22 RX ORDER — TAMSULOSIN HYDROCHLORIDE 0.4 MG/1
0.4 CAPSULE ORAL DAILY
Qty: 30 CAPSULE | Refills: 5 | Status: SHIPPED | OUTPATIENT
Start: 2023-08-22

## 2023-08-22 RX ORDER — NEBULIZER AND COMPRESSOR
EACH MISCELLANEOUS
Qty: 1 KIT | Refills: 0 | Status: SHIPPED | OUTPATIENT
Start: 2023-08-22

## 2023-08-22 RX ORDER — ATORVASTATIN CALCIUM 10 MG/1
10 TABLET, FILM COATED ORAL DAILY
Qty: 90 TABLET | Refills: 1 | Status: SHIPPED | OUTPATIENT
Start: 2023-08-22

## 2023-08-22 ASSESSMENT — ENCOUNTER SYMPTOMS
RHINORRHEA: 0
DIARRHEA: 0
SHORTNESS OF BREATH: 0
BLURRED VISION: 0

## 2023-08-22 ASSESSMENT — PATIENT HEALTH QUESTIONNAIRE - PHQ9
SUM OF ALL RESPONSES TO PHQ9 QUESTIONS 1 & 2: 0
SUM OF ALL RESPONSES TO PHQ QUESTIONS 1-9: 0
2. FEELING DOWN, DEPRESSED OR HOPELESS: 0
1. LITTLE INTEREST OR PLEASURE IN DOING THINGS: 0
SUM OF ALL RESPONSES TO PHQ QUESTIONS 1-9: 0

## 2023-08-22 NOTE — PROGRESS NOTES
Brandon Rocha (:  1975) is a 46 y.o. male,Established patient, here for evaluation of the following chief complaint(s):  Rash (Cream is no longer working, states re-occured. Stomach and chest.right thumb, left pointer finger. Apx 1 month new  episode of rash. 2021, AND AGAIN THIS AUGUST.), Other, Hypertension (Out of medicine xa few weeks.), and Urinary Frequency (Waking up 2x a night which is abnormal. Noted apx a month as well)         ASSESSMENT/PLAN:  1. Preventative health care  -     CBC with Auto Differential; Future  -     Basic Metabolic Panel; Future  2. Mixed hyperlipidemia  -     Lipid Panel; Future  -     atorvastatin (LIPITOR) 10 MG tablet; Take 1 tablet by mouth daily, Disp-90 tablet, R-1Normal  3. HTN (hypertension), benign  -     amLODIPine (NORVASC) 5 MG tablet; TAKE 1 TABLET BY MOUTH EVERY DAY, Disp-90 tablet, R-2Schedule your follow up appointmentNormal  4. Screening for cholesterol level  5. Renal insufficiency  6. Obesity (BMI 30.0-34.9)  7. Rash  -     clotrimazole-betamethasone (LOTRISONE) 1-0.05 % cream; Apply topically 2 times daily. , Disp-45 g, R-1, Normal  8. Colon cancer screening  -     Fecal DNA Colorectal cancer screening (Cologrd)  9. Prediabetes  -     Hemoglobin A1C; Future  10. BPH associated with nocturia  -     tamsulosin (FLOMAX) 0.4 MG capsule; Take 1 capsule by mouth daily, Disp-30 capsule, R-5Normal  -     PSA, Prostatic Specific Antigen;  Future  Patient is current medical issues patient's been out of his blood pressure medicine for about 2 weeks he does get readings at home in the 130s over 90s but here in the office his readings are significantly higher even after resting him we did discuss the effects of high blood pressure on his kidneys as well as in his heart and his brain we can have the patient go back on medication and recheck his blood pressure within the next week to 10 days in the office with one of the staff patient is to bring his blood

## 2023-08-22 NOTE — TELEPHONE ENCOUNTER
Roxann? If dme is approved for new BP cuff RX.   Wonders about antifungul pill instead of cream states not been working

## 2023-08-23 ENCOUNTER — TELEPHONE (OUTPATIENT)
Dept: INTERNAL MEDICINE CLINIC | Age: 48
End: 2023-08-23

## 2023-08-23 LAB
ANION GAP SERPL CALCULATED.3IONS-SCNC: 14 MMOL/L (ref 3–16)
BUN SERPL-MCNC: 21 MG/DL (ref 7–20)
CALCIUM SERPL-MCNC: 9.5 MG/DL (ref 8.3–10.6)
CHLORIDE SERPL-SCNC: 109 MMOL/L (ref 99–110)
CHOLEST SERPL-MCNC: 308 MG/DL (ref 0–199)
CO2 SERPL-SCNC: 23 MMOL/L (ref 21–32)
CREAT SERPL-MCNC: 2 MG/DL (ref 0.9–1.3)
EST. AVERAGE GLUCOSE BLD GHB EST-MCNC: 102.5 MG/DL
GFR SERPLBLD CREATININE-BSD FMLA CKD-EPI: 40 ML/MIN/{1.73_M2}
GLUCOSE SERPL-MCNC: 92 MG/DL (ref 70–99)
HBA1C MFR BLD: 5.2 %
HDLC SERPL-MCNC: 52 MG/DL (ref 40–60)
LDLC SERPL CALC-MCNC: 228 MG/DL
POTASSIUM SERPL-SCNC: 4.4 MMOL/L (ref 3.5–5.1)
PSA SERPL DL<=0.01 NG/ML-MCNC: 1.83 NG/ML (ref 0–4)
SODIUM SERPL-SCNC: 146 MMOL/L (ref 136–145)
TRIGL SERPL-MCNC: 141 MG/DL (ref 0–150)
VLDLC SERPL CALC-MCNC: 28 MG/DL

## 2023-08-23 NOTE — TELEPHONE ENCOUNTER
----- Message from [facility] sent at 8/23/2023  8:37 AM EDT -----  Subject: Message to Provider    QUESTIONS  Information for Provider? Patient was seen yesterday (8/22) and wanted to   relay a message to his provider and the nurse. The patient stated he   forgot to mention that he has been on a carb-restricted diet for the last   6 weeks and that could be a reason for his rash. The patient stated he saw   somewhere online about a Keto Rash. Patient would like someone to call him   back about this. Please advise.   ---------------------------------------------------------------------------  --------------  Baron HOOPERQLUIS F  [phone]; OK to leave message on voicemail  ---------------------------------------------------------------------------  --------------  SCRIPT ANSWERS  Relationship to Patient?  Self

## 2023-08-23 NOTE — TELEPHONE ENCOUNTER
----- Message from Jonh Barbosa sent at 8/22/2023  4:29 PM EDT -----  Subject: Message to Provider    QUESTIONS  Information for Provider? Lindsay was seen today and was told that a fungal   medication in pill form would be sent to [address] [place] [address],   [id number] [address], and has not   been put through, he can be reached at [phone] ok to leva e maessage  ---------------------------------------------------------------------------  --------------  Freida VILLA  [phone]; OK to leave message on voicemail  ---------------------------------------------------------------------------  --------------  SCRIPT ANSWERS  Relationship to Patient?  Self

## 2023-08-23 NOTE — TELEPHONE ENCOUNTER
----- Message from Josefina Shaka sent at 8/22/2023  4:29 PM EDT -----  Subject: Message to Provider    QUESTIONS  Information for Provider? Lindsay was seen today and was told that a fungal   medication in pill form would be sent to [address] [place] [address],   [id number] [address], and has not   been put through, he can be reached at [phone] ok to leva e maessage  ---------------------------------------------------------------------------  --------------  Kamilla Marker INFO  [phone]; OK to leave message on voicemail  ---------------------------------------------------------------------------  --------------  SCRIPT ANSWERS  Relationship to Patient?  Self

## 2023-08-23 NOTE — TELEPHONE ENCOUNTER
Please let the patient know that it is unlikely to be the etiology for it so he is to use the medication I prescribed him and repeat his blood test

## 2023-08-23 NOTE — TELEPHONE ENCOUNTER
----- Message from Roberta Mckeon sent at 8/22/2023  4:29 PM EDT -----  Subject: Message to Provider    QUESTIONS  Information for Provider? Lindsay was seen today and was told that a fungal   medication in pill form would be sent to [address] [place] [address],   [id number] [address], and has not   been put through, he can be reached at [phone] ok to leva e maessage  ---------------------------------------------------------------------------  --------------  Emmanuel An INFO  [phone]; OK to leave message on voicemail  ---------------------------------------------------------------------------  --------------  SCRIPT ANSWERS  Relationship to Patient?  Self

## 2023-08-23 NOTE — TELEPHONE ENCOUNTER
Patient states he has been on a diet for the last 6 wks where he is \"barely eating\" carbs. He states he read online that this can cause a rash and dehydration due to ketosis. Would like to see if Dr Underwood Speaks thinks this is cause of the rash. Patient states he has no plans on going off the diet so I did advise patient he needs to increase fluids and recheck his lab in a week.

## 2023-08-23 NOTE — RESULT ENCOUNTER NOTE
Please let the patient know that results shows that his kidney function has worsened again and his cholesterol is extremely elevated please advise patient that he needs to take his medications regularly and I would like him to repeat his kidney function in about 1 week

## 2023-08-23 NOTE — TELEPHONE ENCOUNTER
----- Message from [facility] sent at 8/23/2023  8:38 AM EDT -----  Subject: Results Request    QUESTIONS  Results: [id number]; Ordered by: Buck Egan   Date Performed: 2023-08-22  ---------------------------------------------------------------------------  --------------  [place] [facility]    [phone]; OK to leave message on voicemail  ---------------------------------------------------------------------------  --------------

## 2023-08-28 ENCOUNTER — TELEPHONE (OUTPATIENT)
Dept: INTERNAL MEDICINE CLINIC | Age: 48
End: 2023-08-28

## 2023-08-28 RX ORDER — FLUCONAZOLE 100 MG/1
100 TABLET ORAL DAILY
Qty: 7 TABLET | Refills: 0 | Status: SHIPPED | OUTPATIENT
Start: 2023-08-28 | End: 2023-09-04

## 2023-08-28 NOTE — TELEPHONE ENCOUNTER
Pt calling, states rash has not improved. He states he was instructed to call if it did not get better. Please advise next steps and call pt. [facility].

## 2023-09-06 RX ORDER — LEVOFLOXACIN 500 MG/1
500 TABLET, FILM COATED ORAL DAILY
Qty: 10 TABLET | Refills: 0 | Status: SHIPPED | OUTPATIENT
Start: 2023-09-06 | End: 2023-09-06

## 2023-09-06 NOTE — TELEPHONE ENCOUNTER
From: NELI GALLO  To: Lindsay Ledesma  Sent: 8/22/2023 1:48 PM EDT  Subject: per insurance    I sent an order for \"Automatic Blood Pressure Cuff\" to [facility] today. Insurance believes once every 8 years it may be covered. Let us know if you have any issues otherwise please continue to monitor your BP keeping record and taking your medications as prescribed.

## 2023-09-07 ENCOUNTER — TELEPHONE (OUTPATIENT)
Dept: INTERNAL MEDICINE CLINIC | Age: 48
End: 2023-09-07

## 2023-09-07 ENCOUNTER — PATIENT MESSAGE (OUTPATIENT)
Dept: INTERNAL MEDICINE CLINIC | Age: 48
End: 2023-09-07

## 2023-09-07 DIAGNOSIS — R21 RASH: Primary | ICD-10-CM

## 2023-09-07 NOTE — TELEPHONE ENCOUNTER
States kevin they are waiting on certificate of medical necessity (states spoke to Janee Hunter Friday 9/1/23 and provider was out of the office.

## 2023-09-07 NOTE — TELEPHONE ENCOUNTER
From: NELI GALLO  To: Lindsay Diego  Sent: 9/7/2023 11:06 AM EDT  Subject: rash    We will need to get an appointment for you for the provider to be able to take a look at the rash. I have been awaiting a fax with some paperwork also for the blood pressure cuff I did reach out again today I was told they would refax the \"certificate of medical necessity\" form.

## 2023-09-07 NOTE — TELEPHONE ENCOUNTER
Please advise the patient that I did send a new cream he can use it for the next 5 to 7 days if it does not improve have him come back in for an evaluation

## 2023-09-07 NOTE — TELEPHONE ENCOUNTER
Pt calling stating [facility] is asking for more info for pt's order for his bp cuff. Paperwork was faxed over from them.    Also pt requesting a different cream for rash that has not improved please advise

## 2023-11-13 DIAGNOSIS — R21 RASH: ICD-10-CM

## 2023-11-13 RX ORDER — CLOTRIMAZOLE AND BETAMETHASONE DIPROPIONATE 10; .64 MG/G; MG/G
CREAM TOPICAL
Qty: 45 G | Refills: 0 | Status: SHIPPED | OUTPATIENT
Start: 2023-11-13

## 2023-11-13 NOTE — TELEPHONE ENCOUNTER
Last OV: 8/22/2023  Next OV: Visit date not found    Next appointment due:around 11/22/2023    Last fill:8/22/23  Refills:1

## 2024-02-27 ENCOUNTER — OFFICE VISIT (OUTPATIENT)
Dept: INTERNAL MEDICINE CLINIC | Age: 49
End: 2024-02-27
Payer: COMMERCIAL

## 2024-02-27 VITALS
HEIGHT: 72 IN | WEIGHT: 233.6 LBS | BODY MASS INDEX: 31.64 KG/M2 | DIASTOLIC BLOOD PRESSURE: 80 MMHG | SYSTOLIC BLOOD PRESSURE: 122 MMHG

## 2024-02-27 DIAGNOSIS — I10 HTN (HYPERTENSION), BENIGN: ICD-10-CM

## 2024-02-27 DIAGNOSIS — R42 LIGHT HEADEDNESS: Primary | ICD-10-CM

## 2024-02-27 LAB
BACTERIA URNS QL MICRO: NORMAL /HPF
CREAT UR-MCNC: 216 MG/DL (ref 39–259)
EPI CELLS #/AREA URNS AUTO: 0 /HPF (ref 0–5)
HYALINE CASTS #/AREA URNS AUTO: 1 /LPF (ref 0–8)
MICROALBUMIN UR DL<=1MG/L-MCNC: 1.7 MG/DL
MICROALBUMIN/CREAT UR: 7.9 MG/G (ref 0–30)
RBC CLUMPS #/AREA URNS AUTO: 0 /HPF (ref 0–4)
URN SPEC COLLECT METH UR: NORMAL
WBC #/AREA URNS AUTO: 1 /HPF (ref 0–5)

## 2024-02-27 PROCEDURE — 3079F DIAST BP 80-89 MM HG: CPT | Performed by: INTERNAL MEDICINE

## 2024-02-27 PROCEDURE — G8417 CALC BMI ABV UP PARAM F/U: HCPCS | Performed by: INTERNAL MEDICINE

## 2024-02-27 PROCEDURE — G8484 FLU IMMUNIZE NO ADMIN: HCPCS | Performed by: INTERNAL MEDICINE

## 2024-02-27 PROCEDURE — 3017F COLORECTAL CA SCREEN DOC REV: CPT | Performed by: INTERNAL MEDICINE

## 2024-02-27 PROCEDURE — 1036F TOBACCO NON-USER: CPT | Performed by: INTERNAL MEDICINE

## 2024-02-27 PROCEDURE — 3074F SYST BP LT 130 MM HG: CPT | Performed by: INTERNAL MEDICINE

## 2024-02-27 PROCEDURE — 99213 OFFICE O/P EST LOW 20 MIN: CPT | Performed by: INTERNAL MEDICINE

## 2024-02-27 PROCEDURE — G8427 DOCREV CUR MEDS BY ELIG CLIN: HCPCS | Performed by: INTERNAL MEDICINE

## 2024-02-27 SDOH — ECONOMIC STABILITY: FOOD INSECURITY: WITHIN THE PAST 12 MONTHS, THE FOOD YOU BOUGHT JUST DIDN'T LAST AND YOU DIDN'T HAVE MONEY TO GET MORE.: NEVER TRUE

## 2024-02-27 SDOH — ECONOMIC STABILITY: FOOD INSECURITY: WITHIN THE PAST 12 MONTHS, YOU WORRIED THAT YOUR FOOD WOULD RUN OUT BEFORE YOU GOT MONEY TO BUY MORE.: NEVER TRUE

## 2024-02-27 SDOH — ECONOMIC STABILITY: INCOME INSECURITY: HOW HARD IS IT FOR YOU TO PAY FOR THE VERY BASICS LIKE FOOD, HOUSING, MEDICAL CARE, AND HEATING?: NOT HARD AT ALL

## 2024-02-27 ASSESSMENT — PATIENT HEALTH QUESTIONNAIRE - PHQ9
SUM OF ALL RESPONSES TO PHQ QUESTIONS 1-9: 0
SUM OF ALL RESPONSES TO PHQ9 QUESTIONS 1 & 2: 0
SUM OF ALL RESPONSES TO PHQ QUESTIONS 1-9: 0
SUM OF ALL RESPONSES TO PHQ QUESTIONS 1-9: 0
1. LITTLE INTEREST OR PLEASURE IN DOING THINGS: 0
2. FEELING DOWN, DEPRESSED OR HOPELESS: 0
SUM OF ALL RESPONSES TO PHQ QUESTIONS 1-9: 0

## 2024-02-27 NOTE — PROGRESS NOTES
Chief Complaint   Patient presents with    Dizziness     while standing up X 3 days      HPI  Feeling light headed when he stands up for 3 days.  It feels like the blood is draining from his head.  He started taking Flomax about a week ago.  (This had been prescribed several months ago, but he just started taking it).  Symptoms last 5-10 seconds, the resolve.      Recently started Flomax for nocturnal urinary frequency.  He was urinating up to 5 times a night, but he is now going 1-2 times a night.      ROS:  Neg for falling or loss of consciousness  Neg for vertigo    EXAM  /80   Ht 1.829 m (6')   Wt 106 kg (233 lb 9.6 oz)   BMI 31.68 kg/m²    Orthostatic vital signs negative  Standing /88; laying down /80  GEN: WN/WD, NAD  Eyes: PERRL, normal EOM, no nystagmus  CV: regular rate and rhythm, no murmurs rubs or gallops  Resp: normal effort, clear auscultation bilaterally  No peripheral edema  Neuro: Cranial nerves II through XII are intact, 5 out of 5 motor function in all extremities, sensory function intact to light touch in all extremities     A/P  1. Light headedness  This appears to be a side effect from Flomax which was started recently.  Orthostatic vitals were normal as noted above.  We discussed the following options:  1. Continue flomax anticipating that side effects are likely to improve with time. Use caution when standing and pause until symptoms resolve before beginning activity  2. Stop flomax and begin finasteride. Risks/benefits/possible SE discussed.  3. Stop flomax and refer to urology for alternative BPH treatment options.    He would like to try option 1.  If symptoms do not improve over the next 1-2 weeks, he will contact the office.  Otherwise, he will plan to follow up with Dr. Mejia as scheduled on 3/25

## 2024-02-28 LAB
ALBUMIN SERPL-MCNC: 4.5 G/DL (ref 3.4–5)
ANION GAP SERPL CALCULATED.3IONS-SCNC: 18 MMOL/L (ref 3–16)
BUN SERPL-MCNC: 20 MG/DL (ref 7–20)
CALCIUM SERPL-MCNC: 9.3 MG/DL (ref 8.3–10.6)
CHLORIDE SERPL-SCNC: 107 MMOL/L (ref 99–110)
CO2 SERPL-SCNC: 16 MMOL/L (ref 21–32)
CREAT SERPL-MCNC: 1.4 MG/DL (ref 0.9–1.3)
GFR SERPLBLD CREATININE-BSD FMLA CKD-EPI: >60 ML/MIN/{1.73_M2}
GLUCOSE SERPL-MCNC: 99 MG/DL (ref 70–99)
PHOSPHATE SERPL-MCNC: 3.7 MG/DL (ref 2.5–4.9)
POTASSIUM SERPL-SCNC: 4.6 MMOL/L (ref 3.5–5.1)
SODIUM SERPL-SCNC: 141 MMOL/L (ref 136–145)

## 2024-02-29 ENCOUNTER — TELEPHONE (OUTPATIENT)
Dept: INTERNAL MEDICINE CLINIC | Age: 49
End: 2024-02-29

## 2024-02-29 NOTE — TELEPHONE ENCOUNTER
Pt calling is Dr Calvert pt--saw Dr Unger on Tues and he had him do lab work--pt is calling for those results---please call the pt. Thanks.

## 2024-03-16 DIAGNOSIS — E78.2 MIXED HYPERLIPIDEMIA: ICD-10-CM

## 2024-03-18 RX ORDER — ATORVASTATIN CALCIUM 10 MG/1
10 TABLET, FILM COATED ORAL DAILY
Qty: 90 TABLET | Refills: 1 | OUTPATIENT
Start: 2024-03-18

## 2024-03-18 NOTE — TELEPHONE ENCOUNTER
Last OV: 2/27/2024  Next OV: Visit date not found    Next appointment due:around 11/22/2023     Last fill:8/22/23  Refills:1#90

## 2024-06-10 DIAGNOSIS — I10 HTN (HYPERTENSION), BENIGN: ICD-10-CM

## 2024-06-10 RX ORDER — AMLODIPINE BESYLATE 10 MG/1
10 TABLET ORAL DAILY
Qty: 30 TABLET | Refills: 0 | Status: SHIPPED | OUTPATIENT
Start: 2024-06-10

## 2024-06-10 NOTE — TELEPHONE ENCOUNTER
Pt calling has misplaced his BP medication (amlodipine) asking if you could call in a replacement script for him--to [facility] [address]  Thanks.

## 2024-07-29 ENCOUNTER — TELEPHONE (OUTPATIENT)
Dept: INTERNAL MEDICINE CLINIC | Age: 49
End: 2024-07-29

## 2024-07-29 ENCOUNTER — HOSPITAL ENCOUNTER (EMERGENCY)
Age: 49
Discharge: HOME | End: 2024-07-29
Payer: COMMERCIAL

## 2024-07-29 ENCOUNTER — TELEMEDICINE (OUTPATIENT)
Age: 49
End: 2024-07-29
Payer: COMMERCIAL

## 2024-07-29 ENCOUNTER — APPOINTMENT (OUTPATIENT)
Dept: CT IMAGING | Age: 49
End: 2024-07-29
Payer: COMMERCIAL

## 2024-07-29 VITALS
RESPIRATION RATE: 16 BRPM | BODY MASS INDEX: 32.96 KG/M2 | DIASTOLIC BLOOD PRESSURE: 90 MMHG | TEMPERATURE: 98.4 F | WEIGHT: 235.4 LBS | OXYGEN SATURATION: 100 % | HEART RATE: 74 BPM | HEIGHT: 71 IN | SYSTOLIC BLOOD PRESSURE: 145 MMHG

## 2024-07-29 DIAGNOSIS — R20.2 NUMBNESS AND TINGLING: ICD-10-CM

## 2024-07-29 DIAGNOSIS — G43.101 MIGRAINE WITH AURA AND WITH STATUS MIGRAINOSUS, NOT INTRACTABLE: Primary | ICD-10-CM

## 2024-07-29 DIAGNOSIS — R20.2 TINGLING IN EXTREMITIES: ICD-10-CM

## 2024-07-29 DIAGNOSIS — R20.0 NUMBNESS AND TINGLING: ICD-10-CM

## 2024-07-29 DIAGNOSIS — R79.89 ELEVATED SERUM CREATININE: ICD-10-CM

## 2024-07-29 DIAGNOSIS — R51.9 SINUS HEADACHE: Primary | ICD-10-CM

## 2024-07-29 LAB
ALBUMIN SERPL-MCNC: 4.2 G/DL (ref 3.4–5)
ALBUMIN/GLOB SERPL: 1.4 {RATIO} (ref 1.1–2.2)
ALP SERPL-CCNC: 86 U/L (ref 40–129)
ALT SERPL-CCNC: 14 U/L (ref 10–40)
ANION GAP SERPL CALCULATED.3IONS-SCNC: 13 MMOL/L (ref 3–16)
AST SERPL-CCNC: 15 U/L (ref 15–37)
BASE EXCESS BLDV CALC-SCNC: -3.9 MMOL/L (ref -3–3)
BASOPHILS # BLD: 0.1 K/UL (ref 0–0.2)
BASOPHILS NFR BLD: 1.1 %
BILIRUB SERPL-MCNC: 0.3 MG/DL (ref 0–1)
BUN SERPL-MCNC: 17 MG/DL (ref 7–20)
CALCIUM SERPL-MCNC: 9 MG/DL (ref 8.3–10.6)
CHLORIDE SERPL-SCNC: 105 MMOL/L (ref 99–110)
CO2 BLDV-SCNC: 51 MMOL/L
CO2 SERPL-SCNC: 20 MMOL/L (ref 21–32)
COHGB MFR BLDV: 2.3 % (ref 0–1.5)
CREAT SERPL-MCNC: 1.7 MG/DL (ref 0.9–1.3)
DEPRECATED RDW RBC AUTO: 14.9 % (ref 12.4–15.4)
DO-HGB MFR BLDV: 3 %
EOSINOPHIL # BLD: 0.3 K/UL (ref 0–0.6)
EOSINOPHIL NFR BLD: 4.4 %
GFR SERPLBLD CREATININE-BSD FMLA CKD-EPI: 48 ML/MIN/{1.73_M2}
GLUCOSE SERPL-MCNC: 100 MG/DL (ref 70–99)
HCO3 BLDV-SCNC: 21.4 MMOL/L (ref 23–29)
HCT VFR BLD AUTO: 43.9 % (ref 40.5–52.5)
HGB BLD-MCNC: 14.3 G/DL (ref 13.5–17.5)
LYMPHOCYTES # BLD: 2 K/UL (ref 1–5.1)
LYMPHOCYTES NFR BLD: 34.5 %
MCH RBC QN AUTO: 28.4 PG (ref 26–34)
MCHC RBC AUTO-ENTMCNC: 32.6 G/DL (ref 31–36)
MCV RBC AUTO: 87 FL (ref 80–100)
METHGB MFR BLDV: 0.4 %
MONOCYTES # BLD: 0.7 K/UL (ref 0–1.3)
MONOCYTES NFR BLD: 11.9 %
NEUTROPHILS # BLD: 2.7 K/UL (ref 1.7–7.7)
NEUTROPHILS NFR BLD: 48.1 %
O2 CT VFR BLDV CALC: 20 VOL %
O2 THERAPY: ABNORMAL
PCO2 BLDV: 38.9 MMHG (ref 40–50)
PH BLDV: 7.35 [PH] (ref 7.35–7.45)
PLATELET # BLD AUTO: 358 K/UL (ref 135–450)
PMV BLD AUTO: 7.9 FL (ref 5–10.5)
PO2 BLDV: 86.9 MMHG (ref 25–40)
POTASSIUM SERPL-SCNC: 4 MMOL/L (ref 3.5–5.1)
PROT SERPL-MCNC: 7.3 G/DL (ref 6.4–8.2)
RBC # BLD AUTO: 5.05 M/UL (ref 4.2–5.9)
SAO2 % BLDV: 97 %
SODIUM SERPL-SCNC: 138 MMOL/L (ref 136–145)
TROPONIN, HIGH SENSITIVITY: 9 NG/L (ref 0–22)
WBC # BLD AUTO: 5.7 K/UL (ref 4–11)

## 2024-07-29 PROCEDURE — 1036F TOBACCO NON-USER: CPT | Performed by: NURSE PRACTITIONER

## 2024-07-29 PROCEDURE — 82803 BLOOD GASES ANY COMBINATION: CPT

## 2024-07-29 PROCEDURE — 93005 ELECTROCARDIOGRAM TRACING: CPT | Performed by: PHYSICIAN ASSISTANT

## 2024-07-29 PROCEDURE — 80053 COMPREHEN METABOLIC PANEL: CPT

## 2024-07-29 PROCEDURE — 99213 OFFICE O/P EST LOW 20 MIN: CPT | Performed by: NURSE PRACTITIONER

## 2024-07-29 PROCEDURE — 84484 ASSAY OF TROPONIN QUANT: CPT

## 2024-07-29 PROCEDURE — 85025 COMPLETE CBC W/AUTO DIFF WBC: CPT

## 2024-07-29 PROCEDURE — 70450 CT HEAD/BRAIN W/O DYE: CPT

## 2024-07-29 PROCEDURE — G8417 CALC BMI ABV UP PARAM F/U: HCPCS | Performed by: NURSE PRACTITIONER

## 2024-07-29 PROCEDURE — 99284 EMERGENCY DEPT VISIT MOD MDM: CPT

## 2024-07-29 PROCEDURE — 3017F COLORECTAL CA SCREEN DOC REV: CPT | Performed by: NURSE PRACTITIONER

## 2024-07-29 PROCEDURE — G8427 DOCREV CUR MEDS BY ELIG CLIN: HCPCS | Performed by: NURSE PRACTITIONER

## 2024-07-29 RX ORDER — RIZATRIPTAN BENZOATE 10 MG/1
10 TABLET ORAL
Qty: 10 TABLET | Refills: 0 | Status: SHIPPED | OUTPATIENT
Start: 2024-07-29 | End: 2024-07-30

## 2024-07-29 RX ORDER — AMOXICILLIN AND CLAVULANATE POTASSIUM 875; 125 MG/1; MG/1
1 TABLET, FILM COATED ORAL 2 TIMES DAILY
Qty: 20 TABLET | Refills: 0 | Status: SHIPPED | OUTPATIENT
Start: 2024-07-29 | End: 2024-08-08

## 2024-07-29 ASSESSMENT — PAIN - FUNCTIONAL ASSESSMENT: PAIN_FUNCTIONAL_ASSESSMENT: NONE - DENIES PAIN

## 2024-07-29 ASSESSMENT — ENCOUNTER SYMPTOMS
PHOTOPHOBIA: 1
NAUSEA: 1

## 2024-07-29 ASSESSMENT — LIFESTYLE VARIABLES
HOW MANY STANDARD DRINKS CONTAINING ALCOHOL DO YOU HAVE ON A TYPICAL DAY: PATIENT DOES NOT DRINK
HOW OFTEN DO YOU HAVE A DRINK CONTAINING ALCOHOL: NEVER

## 2024-07-29 NOTE — ED PROVIDER NOTES
None    Records Reviewed (Source):     CC/HPI Summary, DDx, ED Course, and Reassessment:   Lindsay Fay is a 48 y.o. male with past medical history of asthma, hypertension, hyperlipidemia who presents complaining of tingling and bilateral upper and lower extremities for 4 days, headache x 1 month.  Patient reports intermittent frontal headaches x 1 month that he describes as a migraine, they occur gradually, last a day or 2, no relief with OTC medications.  Denies prior similar headaches, migraines, vision change, nausea, vomiting.  For the last 4 days he reports he gets these \"waves \"of tingling and numbness to the bilateral upper extremity and lower extremity.  He reports he is \"losing my mind\" when this occurs, denies any hyperventilation.  This morning as second through fifth toes also feels constantly, denies any weakness, back pain, changes bladder function, chest pain, shortness of breath, dizziness, syncope.    On exam, neuro intact, NIH stroke scale 0, nonfocal.  CBC nonemergent, CMP with creatinine 1.7, baseline between 1.4 and 2, instructed patient for elevated creatinine, need to stay hydrated, avoid NSAIDs, follow-up with primary care, states he has PCP follow-up.  VBG with low pCO2, high pO2, venous pH slightly low, bicarb 21, with patient having episodes of \"freaking out\" and intermittent numbness and tingling his extremities, likely anxiety, panic attack related and also has low CO2 here.  Low concern for CVA, central cause.  CT head without acute emergent finding, does have sinus disease to explain his frontal headache for the last month.  No signs of abscess, severe sepsis, shock.  EKG normal sinus without acute ischemic changes or emergent arrhythmia troponin negative, doubt ACS, PE.  Will treat with Augmentin course for 10 days, instructed to follow-up with primary care for recheck, return for any new or worsening symptoms return for recheck in 24 to 48 hours if not feeling

## 2024-07-29 NOTE — PROGRESS NOTES
history of asthma he states.  He also is complaining of numbness, tingling and burning in his lower toes/feet and bilateral hands the past week.  He states that it gets pretty severe lasting approximately 30 minutes.  He states that the episodes are approximately every 3-4 hours.  He does have a follow-up with his provider on Friday, August 2.    Migraine   This is a new problem. The current episode started 1 to 4 weeks ago. The problem occurs intermittently. The problem has been waxing and waning. The pain is located in the Temporal region. The pain quality is not similar to prior headaches. The quality of the pain is described as throbbing. The pain is moderate. Associated symptoms include nausea, numbness and photophobia. Nothing aggravates the symptoms. He has tried nothing for the symptoms. The treatment provided no relief. His past medical history is significant for hypertension.     Review of Systems   Eyes:  Positive for photophobia and visual disturbance.        Aura present with migraine   Gastrointestinal:  Positive for nausea.   Neurological:  Positive for numbness and headaches.   All other systems reviewed and are negative.        Objective   Patient-Reported Vitals  No data recorded     Physical Exam  [INSTRUCTIONS:  \"[x]\" Indicates a positive item  \"[]\" Indicates a negative item  -- DELETE ALL ITEMS NOT EXAMINED]    Constitutional: [x] Appears well-developed and well-nourished [x] No apparent distress      [] Abnormal -     Mental status: [x] Alert and awake  [x] Oriented to person/place/time [x] Able to follow commands    [] Abnormal -     Eyes:   EOM    [x]  Normal    [] Abnormal -   Sclera  [x]  Normal    [] Abnormal -          Discharge [x]  None visible   [] Abnormal -     HENT: [x] Normocephalic, atraumatic  [] Abnormal -   [] Mouth/Throat: Mucous membranes are moist    External Ears [] Normal  [] Abnormal -    Neck: [x] No visualized mass [] Abnormal -     Pulmonary/Chest: [x] Respiratory

## 2024-07-29 NOTE — TELEPHONE ENCOUNTER
----- Message from Melita Romo sent at 7/29/2024  8:55 AM EDT -----  Regarding: ECC Escalation To Practice  ECC Escalation To Practice      Type of Escalation: Red Flag Symptom  --------------------------------------------------------------------------------------------------------------------------    Information for Provider: Patient has migrain and is experiencing tingling on both hands and feet.   Patient is looking for appointment for: Symptom/Numbness  Reasons for Message: Unable to reach practice     Additional Information: Patient has migrain and is experiencing tingling on both hands and feet and would like to book an appointment with his physician.   --------------------------------------------------------------------------------------------------------------------------    Relationship to Patient: Self     Call Back Info: OK to leave message on [employer]  Preferred Call Back Number: Phone [phone]

## 2024-07-30 ENCOUNTER — OFFICE VISIT (OUTPATIENT)
Dept: INTERNAL MEDICINE CLINIC | Age: 49
End: 2024-07-30
Payer: COMMERCIAL

## 2024-07-30 VITALS
OXYGEN SATURATION: 98 % | DIASTOLIC BLOOD PRESSURE: 88 MMHG | HEART RATE: 87 BPM | HEIGHT: 72 IN | BODY MASS INDEX: 32.1 KG/M2 | WEIGHT: 237 LBS | SYSTOLIC BLOOD PRESSURE: 132 MMHG

## 2024-07-30 DIAGNOSIS — R73.03 PREDIABETES: ICD-10-CM

## 2024-07-30 DIAGNOSIS — Z13.220 SCREENING FOR CHOLESTEROL LEVEL: ICD-10-CM

## 2024-07-30 DIAGNOSIS — I10 HTN (HYPERTENSION), BENIGN: ICD-10-CM

## 2024-07-30 DIAGNOSIS — M25.50 POLYARTHRALGIA: ICD-10-CM

## 2024-07-30 DIAGNOSIS — Z00.00 PREVENTATIVE HEALTH CARE: Primary | ICD-10-CM

## 2024-07-30 DIAGNOSIS — G43.811 OTHER MIGRAINE WITH STATUS MIGRAINOSUS, INTRACTABLE: ICD-10-CM

## 2024-07-30 DIAGNOSIS — G62.9 NEUROPATHY: ICD-10-CM

## 2024-07-30 DIAGNOSIS — J45.20 MILD INTERMITTENT ASTHMA WITHOUT COMPLICATION: ICD-10-CM

## 2024-07-30 DIAGNOSIS — N28.9 RENAL FUNCTION IMPAIRMENT: ICD-10-CM

## 2024-07-30 LAB
ALBUMIN SERPL-MCNC: 4.6 G/DL (ref 3.4–5)
ALBUMIN/GLOB SERPL: 1.6 {RATIO} (ref 1.1–2.2)
ALP SERPL-CCNC: 97 U/L (ref 40–129)
ALT SERPL-CCNC: 15 U/L (ref 10–40)
ANION GAP SERPL CALCULATED.3IONS-SCNC: 13 MMOL/L (ref 3–16)
AST SERPL-CCNC: 16 U/L (ref 15–37)
BASOPHILS # BLD: 0 K/UL (ref 0–0.2)
BASOPHILS NFR BLD: 0.5 %
BILIRUB SERPL-MCNC: 0.4 MG/DL (ref 0–1)
BUN SERPL-MCNC: 16 MG/DL (ref 7–20)
CALCIUM SERPL-MCNC: 9.6 MG/DL (ref 8.3–10.6)
CHLORIDE SERPL-SCNC: 102 MMOL/L (ref 99–110)
CHOLEST SERPL-MCNC: 225 MG/DL (ref 0–199)
CO2 SERPL-SCNC: 23 MMOL/L (ref 21–32)
CREAT SERPL-MCNC: 1.4 MG/DL (ref 0.9–1.3)
CRP SERPL-MCNC: 11.3 MG/L (ref 0–5.1)
DEPRECATED RDW RBC AUTO: 14.7 % (ref 12.4–15.4)
EOSINOPHIL # BLD: 0.2 K/UL (ref 0–0.6)
EOSINOPHIL NFR BLD: 3.3 %
ERYTHROCYTE [SEDIMENTATION RATE] IN BLOOD BY WESTERGREN METHOD: 54 MM/HR (ref 0–20)
EST. AVERAGE GLUCOSE BLD GHB EST-MCNC: 108.3 MG/DL
GFR SERPLBLD CREATININE-BSD FMLA CKD-EPI: 61 ML/MIN/{1.73_M2}
GLUCOSE SERPL-MCNC: 82 MG/DL (ref 70–99)
HBA1C MFR BLD: 5.4 %
HCT VFR BLD AUTO: 46.3 % (ref 40.5–52.5)
HDLC SERPL-MCNC: 52 MG/DL (ref 40–60)
HGB BLD-MCNC: 15.3 G/DL (ref 13.5–17.5)
LDLC SERPL CALC-MCNC: 161 MG/DL
LYMPHOCYTES # BLD: 1.8 K/UL (ref 1–5.1)
LYMPHOCYTES NFR BLD: 28.3 %
MCH RBC QN AUTO: 28.6 PG (ref 26–34)
MCHC RBC AUTO-ENTMCNC: 33.1 G/DL (ref 31–36)
MCV RBC AUTO: 86.5 FL (ref 80–100)
MONOCYTES # BLD: 0.6 K/UL (ref 0–1.3)
MONOCYTES NFR BLD: 9.6 %
NEUTROPHILS # BLD: 3.6 K/UL (ref 1.7–7.7)
NEUTROPHILS NFR BLD: 58.3 %
PLATELET # BLD AUTO: 401 K/UL (ref 135–450)
PMV BLD AUTO: 9.1 FL (ref 5–10.5)
POTASSIUM SERPL-SCNC: 4 MMOL/L (ref 3.5–5.1)
PROT SERPL-MCNC: 7.5 G/DL (ref 6.4–8.2)
RBC # BLD AUTO: 5.35 M/UL (ref 4.2–5.9)
RHEUMATOID FACT SER IA-ACNC: <10 IU/ML
SODIUM SERPL-SCNC: 138 MMOL/L (ref 136–145)
TRIGL SERPL-MCNC: 61 MG/DL (ref 0–150)
TSH SERPL DL<=0.005 MIU/L-ACNC: 1.14 UIU/ML (ref 0.27–4.2)
VLDLC SERPL CALC-MCNC: 12 MG/DL
WBC # BLD AUTO: 6.2 K/UL (ref 4–11)

## 2024-07-30 PROCEDURE — 99214 OFFICE O/P EST MOD 30 MIN: CPT | Performed by: INTERNAL MEDICINE

## 2024-07-30 PROCEDURE — G8427 DOCREV CUR MEDS BY ELIG CLIN: HCPCS | Performed by: INTERNAL MEDICINE

## 2024-07-30 PROCEDURE — 3075F SYST BP GE 130 - 139MM HG: CPT | Performed by: INTERNAL MEDICINE

## 2024-07-30 PROCEDURE — 99396 PREV VISIT EST AGE 40-64: CPT | Performed by: INTERNAL MEDICINE

## 2024-07-30 PROCEDURE — 1036F TOBACCO NON-USER: CPT | Performed by: INTERNAL MEDICINE

## 2024-07-30 PROCEDURE — 3079F DIAST BP 80-89 MM HG: CPT | Performed by: INTERNAL MEDICINE

## 2024-07-30 PROCEDURE — G8417 CALC BMI ABV UP PARAM F/U: HCPCS | Performed by: INTERNAL MEDICINE

## 2024-07-30 PROCEDURE — 3017F COLORECTAL CA SCREEN DOC REV: CPT | Performed by: INTERNAL MEDICINE

## 2024-07-30 RX ORDER — RIMEGEPANT SULFATE 75 MG/75MG
75 TABLET, ORALLY DISINTEGRATING ORAL DAILY PRN
Qty: 4 TABLET | Refills: 0 | Status: SHIPPED | COMMUNITY
Start: 2024-07-30

## 2024-07-30 RX ORDER — RIMEGEPANT SULFATE 75 MG/75MG
75 TABLET, ORALLY DISINTEGRATING ORAL DAILY PRN
Qty: 30 TABLET | Refills: 0 | Status: SHIPPED | OUTPATIENT
Start: 2024-07-30

## 2024-07-30 RX ORDER — SUMATRIPTAN 50 MG/1
50 TABLET, FILM COATED ORAL DAILY PRN
Qty: 9 TABLET | Refills: 0 | Status: SHIPPED | OUTPATIENT
Start: 2024-07-30

## 2024-07-30 ASSESSMENT — ENCOUNTER SYMPTOMS
CHEST TIGHTNESS: 0
ABDOMINAL PAIN: 0
COLOR CHANGE: 0
SINUS PAIN: 0
COUGH: 0
BLOOD IN STOOL: 0
CONSTIPATION: 0
WHEEZING: 0
SHORTNESS OF BREATH: 0

## 2024-07-30 NOTE — PROGRESS NOTES
Lindsay Fay (:  1975) is a 48 y.o. male,Established patient, here for evaluation of the following chief complaint(s):  Follow-up (Migraines, tingling in hands and feet, 3 toes on the right feet numb )      Assessment & Plan   ASSESSMENT/PLAN:  1. Preventative health care  2. HTN (hypertension), benign  3. Mild intermittent asthma without complication  4. Prediabetes  -     Hemoglobin A1C; Future  5. Screening for cholesterol level  -     Lipid Panel; Future  6. Neuropathy  -     TSH; Future  -     Vitamin B12 & Folate; Future  -     Electrophoresis Protein, Serum; Future  -     CBC with Auto Differential; Future  -     Comprehensive Metabolic Panel; Future  -     Treponema Pallidum (Syphilis) Antibody; Future  -     EMG; Future  7. Polyarthralgia  -     Sedimentation Rate; Future  -     C-Reactive Protein; Future  -     Lupus (LE) panel w/ reflex; Future  -     Rheumatoid Factor; Future  -     Cyclic Citrul Peptide Antibody, IgG; Future  -     MPO/TN-3(ANCA) ABS; Future  -     Anti SSA; Future  8. Renal function impairment  -     US RETROPERITONEAL COMPLETE; Future  9. Other migraine with status migrainosus, intractable  -     rimegepant sulfate (NURTEC) 75 MG TBDP; Take 75 mg by mouth daily as needed (migrain), Disp-30 tablet, R-0Normal  -     SUMAtriptan (IMITREX) 50 MG tablet; Take 1 tablet by mouth daily as needed for Migraine, Disp-9 tablet, R-0Normal  Reviewed patient is current medical issues unfortunately the patient blood pressure is still elevated and active I did discuss with him importance of taking his medication and the plan of rebound hypertension at this point the patient will have his blood pressure monitored at home and will let me know if his changes    Patient continues to have migraine episodes it is becoming progressively worse will virgil start him on Nurtec which we tried in the office and it worked very well for him at the same time he is having acute sinusitis which is aggravating

## 2024-07-31 ENCOUNTER — TELEPHONE (OUTPATIENT)
Dept: INTERNAL MEDICINE CLINIC | Age: 49
End: 2024-07-31

## 2024-07-31 ENCOUNTER — TELEPHONE (OUTPATIENT)
Dept: ADMINISTRATIVE | Age: 49
End: 2024-07-31

## 2024-07-31 LAB
ALBUMIN SERPL ELPH-MCNC: 3.5 G/DL (ref 3.1–4.9)
ALPHA1 GLOB SERPL ELPH-MCNC: 0.3 G/DL (ref 0.2–0.4)
ALPHA2 GLOB SERPL ELPH-MCNC: 1 G/DL (ref 0.4–1.1)
B-GLOBULIN SERPL ELPH-MCNC: 1.4 G/DL (ref 0.9–1.6)
CCP IGG SERPL-ACNC: <0.5 U/ML (ref 0–2.9)
EKG ATRIAL RATE: 75 BPM
EKG DIAGNOSIS: NORMAL
EKG P AXIS: 35 DEGREES
EKG P-R INTERVAL: 188 MS
EKG Q-T INTERVAL: 386 MS
EKG QRS DURATION: 96 MS
EKG QTC CALCULATION (BAZETT): 431 MS
EKG R AXIS: 51 DEGREES
EKG T AXIS: 17 DEGREES
EKG VENTRICULAR RATE: 75 BPM
ENA SS-A AB SER IA-ACNC: <0.2 AI (ref 0–0.9)
FOLATE SERPL-MCNC: 4.43 NG/ML (ref 4.78–24.2)
GAMMA GLOB SERPL ELPH-MCNC: 1.4 G/DL (ref 0.6–1.8)
SPE/IFE INTERPRETATION: NORMAL
VIT B12 SERPL-MCNC: 659 PG/ML (ref 211–911)

## 2024-07-31 PROCEDURE — 93010 ELECTROCARDIOGRAM REPORT: CPT | Performed by: INTERNAL MEDICINE

## 2024-07-31 RX ORDER — FOLIC ACID 1 MG/1
1 TABLET ORAL DAILY
Qty: 90 TABLET | Refills: 1 | Status: SHIPPED | OUTPATIENT
Start: 2024-07-31

## 2024-07-31 NOTE — RESULT ENCOUNTER NOTE
Please let the patient know that results were acceptable his kidney function is improved but his folic acid is reduced he is advised to take folic acid 400 mg daily available over-the-counter will still waiting the rest of his autoimmune screening

## 2024-07-31 NOTE — TELEPHONE ENCOUNTER
Submitted PA for Nurtec 75MG dispersible tablets   Via CM (Key: [id number]) STATUS: PENDING.    Follow up done daily; if no decision with in three days we will refax.  If another three days goes by with no decision will call the insurance for status.

## 2024-07-31 NOTE — TELEPHONE ENCOUNTER
Pt calling asking for all future scripts to be sent to [facility] on [address]  Thanks--Please change his pharmacy---Thanks.

## 2024-08-01 LAB
MYELOPEROXIDASE AB SER-ACNC: 0 AU/ML (ref 0–19)
PROTEINASE3 AB SER-ACNC: 0 AU/ML (ref 0–19)

## 2024-08-01 NOTE — TELEPHONE ENCOUNTER
The medication was DENIED; DENIAL letter uploaded to MED[employer].    Coverage is provided when the member has met the step therapy requirement for this medication. Step therapy is a type of prior authorization that requires that you try one or more preferred drugs before you are approved for the drug requested. The requested medication requires the member to have a history of at least 14 days of therapy with at least two preferred medications which include but are not limited to: naratriptan tablets, rizatriptan (oral disintegrating tablets and tablets), and sumatriptan (injection, nasal spray, and tablets).      If you want an APPEAL; please note in this encounter what new information you would like to APPEAL with.  Once complete route back to PA POOL.    If this requires a response please respond to the pool ( P MHCX PSC MEDICATION PRE-AUTH).      Thank you please advise patient.

## 2024-08-02 LAB
ANA PATTERN: ABNORMAL
ANA TITER: ABNORMAL
ANTINUCLEAR AB INTERPRETIVE COMMENT: ABNORMAL
C3 SERPL-MCNC: 157 MG/DL (ref 90–180)
C4 SERPL-MCNC: 33 MG/DL (ref 10–40)
NUCLEAR IGG SER QL IA: DETECTED
NUCLEAR IGG SER QL IF: DETECTED
RHEUMATOID FACT SER NEPH-ACNC: <10 IU/ML (ref 0–14)
T PALLIDUM IGG SER QL IF: NON REACTIVE

## 2024-08-03 LAB
DSDNA AB TITR SER CLIF: 7 IU (ref 0–24)
THYROPEROXIDASE AB SERPL-ACNC: 0.4 IU/ML (ref 0–9)

## 2024-08-04 LAB — ENA RNP IGG SER IA-ACNC: 7 UNITS (ref 0–19)

## 2024-08-05 LAB
ENA SCL70 IGG SER QL: 3 AU/ML (ref 0–40)
ENA SM IGG SER-ACNC: 2 AU/ML (ref 0–40)
ENA SS-A 60KD AB SER-ACNC: 1 AU/ML (ref 0–40)
ENA SS-A IGG SER IA-ACNC: 3 AU/ML (ref 0–40)
ENA SS-B IGG SER IA-ACNC: 0 AU/ML (ref 0–40)

## 2024-09-26 ENCOUNTER — TELEPHONE (OUTPATIENT)
Dept: INTERNAL MEDICINE CLINIC | Age: 49
End: 2024-09-26

## 2024-09-26 ENCOUNTER — OFFICE VISIT (OUTPATIENT)
Dept: INTERNAL MEDICINE CLINIC | Age: 49
End: 2024-09-26
Payer: COMMERCIAL

## 2024-09-26 VITALS
SYSTOLIC BLOOD PRESSURE: 134 MMHG | HEART RATE: 84 BPM | BODY MASS INDEX: 31.87 KG/M2 | WEIGHT: 235 LBS | OXYGEN SATURATION: 98 % | DIASTOLIC BLOOD PRESSURE: 88 MMHG

## 2024-09-26 DIAGNOSIS — Z12.11 COLON CANCER SCREENING: Primary | ICD-10-CM

## 2024-09-26 DIAGNOSIS — I10 HTN (HYPERTENSION), BENIGN: ICD-10-CM

## 2024-09-26 DIAGNOSIS — G43.811 OTHER MIGRAINE WITH STATUS MIGRAINOSUS, INTRACTABLE: ICD-10-CM

## 2024-09-26 PROCEDURE — G8427 DOCREV CUR MEDS BY ELIG CLIN: HCPCS | Performed by: INTERNAL MEDICINE

## 2024-09-26 PROCEDURE — G8417 CALC BMI ABV UP PARAM F/U: HCPCS | Performed by: INTERNAL MEDICINE

## 2024-09-26 PROCEDURE — 3074F SYST BP LT 130 MM HG: CPT | Performed by: INTERNAL MEDICINE

## 2024-09-26 PROCEDURE — 3017F COLORECTAL CA SCREEN DOC REV: CPT | Performed by: INTERNAL MEDICINE

## 2024-09-26 PROCEDURE — 1036F TOBACCO NON-USER: CPT | Performed by: INTERNAL MEDICINE

## 2024-09-26 PROCEDURE — 99214 OFFICE O/P EST MOD 30 MIN: CPT | Performed by: INTERNAL MEDICINE

## 2024-09-26 PROCEDURE — 3079F DIAST BP 80-89 MM HG: CPT | Performed by: INTERNAL MEDICINE

## 2024-09-26 RX ORDER — RIMEGEPANT SULFATE 75 MG/75MG
75 TABLET, ORALLY DISINTEGRATING ORAL DAILY PRN
Qty: 30 TABLET | Refills: 0 | Status: SHIPPED | OUTPATIENT
Start: 2024-09-26

## 2024-09-26 RX ORDER — AMLODIPINE BESYLATE 10 MG/1
10 TABLET ORAL DAILY
Qty: 90 TABLET | Refills: 1 | Status: SHIPPED | OUTPATIENT
Start: 2024-09-26

## 2024-09-26 ASSESSMENT — ENCOUNTER SYMPTOMS
SHORTNESS OF BREATH: 0
BLOOD IN STOOL: 0
ABDOMINAL PAIN: 0
COUGH: 0
CHEST TIGHTNESS: 0
CONSTIPATION: 0
SINUS PAIN: 0
COLOR CHANGE: 0
WHEEZING: 0

## 2024-09-27 RX ORDER — RIMEGEPANT SULFATE 75 MG/75MG
1 TABLET, ORALLY DISINTEGRATING ORAL PRN
Qty: 2 TABLET | Refills: 1 | Status: CANCELLED | COMMUNITY
Start: 2024-09-27

## 2024-09-27 NOTE — TELEPHONE ENCOUNTER
Submitted PA for Nurtec 75MG dispersible tablets   Via [facility] (Key: [id number]) STATUS: PENDING.    Follow up done daily; if no decision with in three days we will refax.  If another three days goes by with no decision will call the insurance for status.

## 2024-09-30 NOTE — TELEPHONE ENCOUNTER
The medication is APPROVED  THRU 03/24/2025 for 8 tablets per 30 days.     If this requires a response please respond to the pool ( P MHCX PSC MEDICATION PRE-AUTH).      Thank you please advise patient.

## 2024-10-01 ENCOUNTER — TELEPHONE (OUTPATIENT)
Dept: INTERNAL MEDICINE CLINIC | Age: 49
End: 2024-10-01

## 2024-10-01 NOTE — TELEPHONE ENCOUNTER
Pt calling about the [facility] for his migraine--going to take the pharmacy a few days to get it in--asking for some samples for right now---please call the pt. Thanks.

## 2024-10-04 ENCOUNTER — TELEPHONE (OUTPATIENT)
Dept: INTERNAL MEDICINE CLINIC | Age: 49
End: 2024-10-04

## 2024-10-04 DIAGNOSIS — G43.811 OTHER MIGRAINE WITH STATUS MIGRAINOSUS, INTRACTABLE: Primary | ICD-10-CM

## 2024-10-04 NOTE — TELEPHONE ENCOUNTER
Pt calling, states Nurtec is causing nausea and not working. Picked medication up yesterday and has taken 2 doses. Unsure what to do, please advise and call pt.

## 2024-10-07 RX ORDER — UBROGEPANT 50 MG/1
1 TABLET ORAL DAILY PRN
Qty: 30 TABLET | Refills: 2 | Status: SHIPPED | OUTPATIENT
Start: 2024-10-07

## 2024-10-07 NOTE — TELEPHONE ENCOUNTER
I have ordered Ubrelvy for him and referred him to neurology please update the patient and will have to wait till we get the year prior authorization done for his med

## 2024-10-08 NOTE — TELEPHONE ENCOUNTER
Patient informed and wants to know if [place] papers were received yet . He stated that his job faxed they over .

## 2024-10-09 ENCOUNTER — TELEPHONE (OUTPATIENT)
Dept: ADMINISTRATIVE | Age: 49
End: 2024-10-09

## 2024-10-09 NOTE — TELEPHONE ENCOUNTER
Submitted PA for Ubrelvy 50MG tablets   Via CM (Key: [id number]) STATUS: PENDING.    Rec'd a denial for medication. Resubmitted to pharmacy.     Follow up done daily; if no decision with in three days we will refax.  If another three days goes by with no decision will call the insurance for status.

## 2024-10-10 NOTE — TELEPHONE ENCOUNTER
Submitted PA for Ubrelvy 50MG tablets   Via CM (Key: [id number]) STATUS: PENDING.    Follow up done daily; if no decision with in three days we will refax.  If another three days goes by with no decision will call the insurance for status.

## 2024-10-11 NOTE — TELEPHONE ENCOUNTER
The medication is APPROVED THRU 04/06/2025    If this requires a response please respond to the pool ( P MHCX PSC MEDICATION PRE-AUTH).      Thank you please advise patient.

## 2024-11-07 ENCOUNTER — TELEPHONE (OUTPATIENT)
Dept: INTERNAL MEDICINE CLINIC | Age: 49
End: 2024-11-07

## 2024-11-07 NOTE — TELEPHONE ENCOUNTER
Pt went to an Urgent Care about one week ago and was treated for bronchitis. Still having cough, congestion, no known fever, just not feeling well.    Pt requesting an appointment next Monday, Tuesday or Wednesday, earliest in the day as possible.    He can only do the days mentioned next week - no openings to schedule - any where he can be added?

## 2024-11-12 ENCOUNTER — OFFICE VISIT (OUTPATIENT)
Dept: INTERNAL MEDICINE CLINIC | Age: 49
End: 2024-11-12
Payer: COMMERCIAL

## 2024-11-12 VITALS
BODY MASS INDEX: 31.15 KG/M2 | SYSTOLIC BLOOD PRESSURE: 122 MMHG | WEIGHT: 230 LBS | HEART RATE: 72 BPM | OXYGEN SATURATION: 99 % | DIASTOLIC BLOOD PRESSURE: 94 MMHG | HEIGHT: 72 IN

## 2024-11-12 DIAGNOSIS — J06.9 URTI (ACUTE UPPER RESPIRATORY INFECTION): Primary | ICD-10-CM

## 2024-11-12 DIAGNOSIS — R31.9 HEMATURIA, UNSPECIFIED TYPE: ICD-10-CM

## 2024-11-12 DIAGNOSIS — I10 PRIMARY HYPERTENSION: ICD-10-CM

## 2024-11-12 DIAGNOSIS — R10.31 RIGHT INGUINAL PAIN: ICD-10-CM

## 2024-11-12 LAB
BILIRUBIN, POC: NEGATIVE
BLOOD URINE, POC: NORMAL
CLARITY, POC: NORMAL
COLOR, POC: CLEAR
GLUCOSE URINE, POC: NEGATIVE MG/DL
KETONES, POC: NEGATIVE MG/DL
LEUKOCYTE EST, POC: NEGATIVE
NITRITE, POC: NEGATIVE
PH, POC: 5.5
PROTEIN, POC: NEGATIVE MG/DL
SPECIFIC GRAVITY, POC: >=1.03
UROBILINOGEN, POC: NORMAL MG/DL

## 2024-11-12 PROCEDURE — G8427 DOCREV CUR MEDS BY ELIG CLIN: HCPCS | Performed by: INTERNAL MEDICINE

## 2024-11-12 PROCEDURE — 99214 OFFICE O/P EST MOD 30 MIN: CPT | Performed by: INTERNAL MEDICINE

## 2024-11-12 PROCEDURE — G8417 CALC BMI ABV UP PARAM F/U: HCPCS | Performed by: INTERNAL MEDICINE

## 2024-11-12 PROCEDURE — 3080F DIAST BP >= 90 MM HG: CPT | Performed by: INTERNAL MEDICINE

## 2024-11-12 PROCEDURE — 3017F COLORECTAL CA SCREEN DOC REV: CPT | Performed by: INTERNAL MEDICINE

## 2024-11-12 PROCEDURE — G8484 FLU IMMUNIZE NO ADMIN: HCPCS | Performed by: INTERNAL MEDICINE

## 2024-11-12 PROCEDURE — 81002 URINALYSIS NONAUTO W/O SCOPE: CPT | Performed by: INTERNAL MEDICINE

## 2024-11-12 PROCEDURE — 3074F SYST BP LT 130 MM HG: CPT | Performed by: INTERNAL MEDICINE

## 2024-11-12 PROCEDURE — 1036F TOBACCO NON-USER: CPT | Performed by: INTERNAL MEDICINE

## 2024-11-12 ASSESSMENT — ENCOUNTER SYMPTOMS
COUGH: 1
SHORTNESS OF BREATH: 1
SORE THROAT: 0
RHINORRHEA: 0
WHEEZING: 0
NAUSEA: 0

## 2024-11-12 NOTE — PROGRESS NOTES
04:19 PM    WBC 5.7 07/29/2024 01:14 PM    WBC 4.4 08/22/2023 11:23 AM    HGB 15.3 07/30/2024 04:19 PM    HGB 14.3 07/29/2024 01:14 PM    HGB 15.3 08/22/2023 11:23 AM    HCT 46.3 07/30/2024 04:19 PM    HCT 43.9 07/29/2024 01:14 PM    HCT 45.5 08/22/2023 11:23 AM    MCV 86.5 07/30/2024 04:19 PM    MCV 87.0 07/29/2024 01:14 PM    MCV 88.0 08/22/2023 11:23 AM     07/30/2024 04:19 PM     07/29/2024 01:14 PM     08/22/2023 11:23 AM     Lab Results   Component Value Date/Time    CHOL 225 07/30/2024 04:19 PM    CHOL 308 08/22/2023 11:23 AM    CHOL 195 06/13/2022 11:15 AM    TRIG 61 07/30/2024 04:19 PM    TRIG 141 08/22/2023 11:23 AM    TRIG 47 06/13/2022 11:15 AM    HDL 52 07/30/2024 04:19 PM    HDL 52 08/22/2023 11:23 AM    HDL 58 06/13/2022 11:15 AM           11/12/2024    10:12 AM 9/26/2024     4:13 PM 9/26/2024     3:59 PM   Vitals   SYSTOLIC 122 134 124   DIASTOLIC 94 88 90   Site  Left Upper Arm    Position  Sitting    Pulse 72     SpO2 99 %     Weight - Scale 230 lb     Height 6' 0\"     Body Mass Index 31.19 kg/m2         Cough  This is a new problem. The current episode started in the past 7 days. The cough is Productive of purulent sputum and productive of sputum. Associated symptoms include shortness of breath. Pertinent negatives include no chest pain, chills, rhinorrhea, sore throat, sweats, weight loss or wheezing.   Groin Pain  The patient's pertinent negatives include no genital itching, genital lesions, priapism or scrotal swelling. This is a new problem. The current episode started in the past 7 days. Associated symptoms include coughing and shortness of breath. Pertinent negatives include no chest pain, chills, nausea, painful intercourse or sore throat.       Review of Systems   Constitutional:  Negative for chills and weight loss.   HENT:  Negative for rhinorrhea and sore throat.    Respiratory:  Positive for cough and shortness of breath. Negative for wheezing.    Cardiovascular:

## 2024-11-13 ENCOUNTER — APPOINTMENT (OUTPATIENT)
Dept: CT IMAGING | Age: 49
End: 2024-11-13
Payer: COMMERCIAL

## 2024-11-13 ENCOUNTER — HOSPITAL ENCOUNTER (OUTPATIENT)
Age: 49
Setting detail: OBSERVATION
Discharge: HOME | End: 2024-11-15
Attending: EMERGENCY MEDICINE | Admitting: INTERNAL MEDICINE
Payer: COMMERCIAL

## 2024-11-13 DIAGNOSIS — R10.31 ACUTE ABDOMINAL PAIN IN RIGHT LOWER QUADRANT: Primary | ICD-10-CM

## 2024-11-13 DIAGNOSIS — R52 INTRACTABLE PAIN: ICD-10-CM

## 2024-11-13 PROBLEM — R10.9 ABDOMINAL PAIN: Status: ACTIVE | Noted: 2024-11-13

## 2024-11-13 LAB
ALBUMIN SERPL-MCNC: 3.9 G/DL (ref 3.4–5)
ALBUMIN/GLOB SERPL: 1.1 {RATIO} (ref 1.1–2.2)
ALP SERPL-CCNC: 96 U/L (ref 40–129)
ALT SERPL-CCNC: 17 U/L (ref 10–40)
ANION GAP SERPL CALCULATED.3IONS-SCNC: 11 MMOL/L (ref 3–16)
AST SERPL-CCNC: 18 U/L (ref 15–37)
BASOPHILS # BLD: 0.1 K/UL (ref 0–0.2)
BASOPHILS NFR BLD: 1.1 %
BILIRUB SERPL-MCNC: 0.3 MG/DL (ref 0–1)
BILIRUB UR QL STRIP.AUTO: NEGATIVE
BUN SERPL-MCNC: 20 MG/DL (ref 7–20)
CALCIUM SERPL-MCNC: 8.8 MG/DL (ref 8.3–10.6)
CHLORIDE SERPL-SCNC: 109 MMOL/L (ref 99–110)
CLARITY UR: CLEAR
CO2 SERPL-SCNC: 17 MMOL/L (ref 21–32)
COLOR UR: YELLOW
CREAT SERPL-MCNC: 1.4 MG/DL (ref 0.9–1.3)
DEPRECATED RDW RBC AUTO: 15.9 % (ref 12.4–15.4)
EOSINOPHIL # BLD: 0.2 K/UL (ref 0–0.6)
EOSINOPHIL NFR BLD: 3.8 %
GFR SERPLBLD CREATININE-BSD FMLA CKD-EPI: 61 ML/MIN/{1.73_M2}
GLUCOSE SERPL-MCNC: 103 MG/DL (ref 70–99)
GLUCOSE UR STRIP.AUTO-MCNC: NEGATIVE MG/DL
HCT VFR BLD AUTO: 44.8 % (ref 40.5–52.5)
HGB BLD-MCNC: 14.8 G/DL (ref 13.5–17.5)
HGB UR QL STRIP.AUTO: NEGATIVE
KETONES UR STRIP.AUTO-MCNC: NEGATIVE MG/DL
LEUKOCYTE ESTERASE UR QL STRIP.AUTO: NEGATIVE
LIPASE SERPL-CCNC: 41 U/L (ref 13–60)
LYMPHOCYTES # BLD: 2.3 K/UL (ref 1–5.1)
LYMPHOCYTES NFR BLD: 39.6 %
MCH RBC QN AUTO: 28.7 PG (ref 26–34)
MCHC RBC AUTO-ENTMCNC: 33.1 G/DL (ref 31–36)
MCV RBC AUTO: 86.4 FL (ref 80–100)
MONOCYTES # BLD: 0.6 K/UL (ref 0–1.3)
MONOCYTES NFR BLD: 10.7 %
NEUTROPHILS # BLD: 2.6 K/UL (ref 1.7–7.7)
NEUTROPHILS NFR BLD: 44.8 %
NITRITE UR QL STRIP.AUTO: NEGATIVE
PH UR STRIP.AUTO: 5 [PH] (ref 5–8)
PLATELET # BLD AUTO: 359 K/UL (ref 135–450)
PMV BLD AUTO: 8.5 FL (ref 5–10.5)
POTASSIUM SERPL-SCNC: 3.9 MMOL/L (ref 3.5–5.1)
PROT SERPL-MCNC: 7.4 G/DL (ref 6.4–8.2)
PROT UR STRIP.AUTO-MCNC: NEGATIVE MG/DL
RBC # BLD AUTO: 5.18 M/UL (ref 4.2–5.9)
SODIUM SERPL-SCNC: 137 MMOL/L (ref 136–145)
SP GR UR STRIP.AUTO: 1.01 (ref 1–1.03)
UA COMPLETE W REFLEX CULTURE PNL UR: NORMAL
UA DIPSTICK W REFLEX MICRO PNL UR: NORMAL
URN SPEC COLLECT METH UR: NORMAL
UROBILINOGEN UR STRIP-ACNC: 0.2 E.U./DL
WBC # BLD AUTO: 5.8 K/UL (ref 4–11)

## 2024-11-13 PROCEDURE — 83690 ASSAY OF LIPASE: CPT

## 2024-11-13 PROCEDURE — 80053 COMPREHEN METABOLIC PANEL: CPT

## 2024-11-13 PROCEDURE — APPNB30 APP NON BILLABLE TIME 0-30 MINS: Performed by: NURSE PRACTITIONER

## 2024-11-13 PROCEDURE — 6360000002 HC RX W HCPCS: Performed by: INTERNAL MEDICINE

## 2024-11-13 PROCEDURE — 6370000000 HC RX 637 (ALT 250 FOR IP): Performed by: INTERNAL MEDICINE

## 2024-11-13 PROCEDURE — 74176 CT ABD & PELVIS W/O CONTRAST: CPT

## 2024-11-13 PROCEDURE — G0378 HOSPITAL OBSERVATION PER HR: HCPCS

## 2024-11-13 PROCEDURE — 99285 EMERGENCY DEPT VISIT HI MDM: CPT

## 2024-11-13 PROCEDURE — 81003 URINALYSIS AUTO W/O SCOPE: CPT

## 2024-11-13 PROCEDURE — 85025 COMPLETE CBC W/AUTO DIFF WBC: CPT

## 2024-11-13 PROCEDURE — 2580000003 HC RX 258: Performed by: INTERNAL MEDICINE

## 2024-11-13 PROCEDURE — 6360000002 HC RX W HCPCS: Performed by: EMERGENCY MEDICINE

## 2024-11-13 PROCEDURE — 6370000000 HC RX 637 (ALT 250 FOR IP): Performed by: EMERGENCY MEDICINE

## 2024-11-13 PROCEDURE — 96376 TX/PRO/DX INJ SAME DRUG ADON: CPT

## 2024-11-13 PROCEDURE — 99222 1ST HOSP IP/OBS MODERATE 55: CPT | Performed by: SURGERY

## 2024-11-13 PROCEDURE — 6370000000 HC RX 637 (ALT 250 FOR IP): Performed by: NURSE PRACTITIONER

## 2024-11-13 PROCEDURE — 96374 THER/PROPH/DIAG INJ IV PUSH: CPT

## 2024-11-13 PROCEDURE — 96375 TX/PRO/DX INJ NEW DRUG ADDON: CPT

## 2024-11-13 RX ORDER — ENOXAPARIN SODIUM 100 MG/ML
40 INJECTION SUBCUTANEOUS DAILY
Status: DISCONTINUED | OUTPATIENT
Start: 2024-11-14 | End: 2024-11-15 | Stop reason: HOSPADM

## 2024-11-13 RX ORDER — TAMSULOSIN HYDROCHLORIDE 0.4 MG/1
0.4 CAPSULE ORAL DAILY
Status: DISCONTINUED | OUTPATIENT
Start: 2024-11-13 | End: 2024-11-15 | Stop reason: HOSPADM

## 2024-11-13 RX ORDER — CYCLOBENZAPRINE HCL 10 MG
10 TABLET ORAL ONCE
Status: COMPLETED | OUTPATIENT
Start: 2024-11-13 | End: 2024-11-13

## 2024-11-13 RX ORDER — ONDANSETRON 2 MG/ML
4 INJECTION INTRAMUSCULAR; INTRAVENOUS ONCE
Status: COMPLETED | OUTPATIENT
Start: 2024-11-13 | End: 2024-11-13

## 2024-11-13 RX ORDER — ATORVASTATIN CALCIUM 10 MG/1
10 TABLET, FILM COATED ORAL DAILY
Status: DISCONTINUED | OUTPATIENT
Start: 2024-11-13 | End: 2024-11-13

## 2024-11-13 RX ORDER — SODIUM CHLORIDE 0.9 % (FLUSH) 0.9 %
5-40 SYRINGE (ML) INJECTION PRN
Status: DISCONTINUED | OUTPATIENT
Start: 2024-11-13 | End: 2024-11-15 | Stop reason: HOSPADM

## 2024-11-13 RX ORDER — SODIUM CHLORIDE 9 MG/ML
INJECTION, SOLUTION INTRAVENOUS PRN
Status: DISCONTINUED | OUTPATIENT
Start: 2024-11-13 | End: 2024-11-15 | Stop reason: HOSPADM

## 2024-11-13 RX ORDER — POLYETHYLENE GLYCOL 3350 17 G/17G
17 POWDER, FOR SOLUTION ORAL DAILY PRN
Status: DISCONTINUED | OUTPATIENT
Start: 2024-11-13 | End: 2024-11-15 | Stop reason: HOSPADM

## 2024-11-13 RX ORDER — BISACODYL 5 MG/1
10 TABLET, DELAYED RELEASE ORAL ONCE
Status: COMPLETED | OUTPATIENT
Start: 2024-11-13 | End: 2024-11-13

## 2024-11-13 RX ORDER — AMLODIPINE BESYLATE 5 MG/1
10 TABLET ORAL DAILY
Status: DISCONTINUED | OUTPATIENT
Start: 2024-11-14 | End: 2024-11-15 | Stop reason: HOSPADM

## 2024-11-13 RX ORDER — ACETAMINOPHEN 500 MG
1000 TABLET ORAL ONCE
Status: COMPLETED | OUTPATIENT
Start: 2024-11-13 | End: 2024-11-13

## 2024-11-13 RX ORDER — MORPHINE SULFATE 10 MG/ML
6 INJECTION, SOLUTION INTRAMUSCULAR; INTRAVENOUS ONCE
Status: COMPLETED | OUTPATIENT
Start: 2024-11-13 | End: 2024-11-13

## 2024-11-13 RX ORDER — SODIUM CHLORIDE 0.9 % (FLUSH) 0.9 %
5-40 SYRINGE (ML) INJECTION EVERY 12 HOURS SCHEDULED
Status: DISCONTINUED | OUTPATIENT
Start: 2024-11-13 | End: 2024-11-15 | Stop reason: HOSPADM

## 2024-11-13 RX ORDER — DEXAMETHASONE SODIUM PHOSPHATE 10 MG/ML
8 INJECTION, SOLUTION INTRAMUSCULAR; INTRAVENOUS ONCE
Status: COMPLETED | OUTPATIENT
Start: 2024-11-13 | End: 2024-11-13

## 2024-11-13 RX ORDER — LACTULOSE 10 G/15ML
20 SOLUTION ORAL 2 TIMES DAILY
Status: COMPLETED | OUTPATIENT
Start: 2024-11-13 | End: 2024-11-14

## 2024-11-13 RX ORDER — MORPHINE SULFATE 2 MG/ML
2 INJECTION, SOLUTION INTRAMUSCULAR; INTRAVENOUS EVERY 4 HOURS PRN
Status: DISCONTINUED | OUTPATIENT
Start: 2024-11-13 | End: 2024-11-15 | Stop reason: HOSPADM

## 2024-11-13 RX ORDER — ACETAMINOPHEN 325 MG/1
650 TABLET ORAL EVERY 6 HOURS PRN
Status: DISCONTINUED | OUTPATIENT
Start: 2024-11-13 | End: 2024-11-15 | Stop reason: HOSPADM

## 2024-11-13 RX ORDER — ACETAMINOPHEN 650 MG/1
650 SUPPOSITORY RECTAL EVERY 6 HOURS PRN
Status: DISCONTINUED | OUTPATIENT
Start: 2024-11-13 | End: 2024-11-15 | Stop reason: HOSPADM

## 2024-11-13 RX ORDER — FOLIC ACID 1 MG/1
1 TABLET ORAL DAILY
Status: DISCONTINUED | OUTPATIENT
Start: 2024-11-13 | End: 2024-11-15 | Stop reason: HOSPADM

## 2024-11-13 RX ADMIN — DEXAMETHASONE SODIUM PHOSPHATE 8 MG: 10 INJECTION, SOLUTION INTRAMUSCULAR; INTRAVENOUS at 09:23

## 2024-11-13 RX ADMIN — MORPHINE SULFATE 2 MG: 2 INJECTION, SOLUTION INTRAMUSCULAR; INTRAVENOUS at 22:12

## 2024-11-13 RX ADMIN — LACTULOSE 20 G: 10 SOLUTION ORAL at 21:26

## 2024-11-13 RX ADMIN — BISACODYL 10 MG: 5 TABLET, COATED ORAL at 16:26

## 2024-11-13 RX ADMIN — FOLIC ACID 1 MG: 1 TABLET ORAL at 14:40

## 2024-11-13 RX ADMIN — ACETAMINOPHEN 1000 MG: 500 TABLET ORAL at 09:22

## 2024-11-13 RX ADMIN — MORPHINE SULFATE 2 MG: 2 INJECTION, SOLUTION INTRAMUSCULAR; INTRAVENOUS at 17:56

## 2024-11-13 RX ADMIN — SODIUM CHLORIDE, PRESERVATIVE FREE 10 ML: 5 INJECTION INTRAVENOUS at 21:26

## 2024-11-13 RX ADMIN — MORPHINE SULFATE 6 MG: 10 INJECTION, SOLUTION INTRAMUSCULAR; INTRAVENOUS at 07:30

## 2024-11-13 RX ADMIN — TAMSULOSIN HYDROCHLORIDE 0.4 MG: 0.4 CAPSULE ORAL at 14:40

## 2024-11-13 RX ADMIN — CYCLOBENZAPRINE 10 MG: 10 TABLET, FILM COATED ORAL at 09:23

## 2024-11-13 RX ADMIN — ONDANSETRON 4 MG: 2 INJECTION, SOLUTION INTRAMUSCULAR; INTRAVENOUS at 07:28

## 2024-11-13 ASSESSMENT — PAIN SCALES - GENERAL
PAINLEVEL_OUTOF10: 10
PAINLEVEL_OUTOF10: 7
PAINLEVEL_OUTOF10: 10
PAINLEVEL_OUTOF10: 9
PAINLEVEL_OUTOF10: 6
PAINLEVEL_OUTOF10: 6

## 2024-11-13 ASSESSMENT — PAIN DESCRIPTION - LOCATION
LOCATION: ABDOMEN
LOCATION: ABDOMEN;FLANK

## 2024-11-13 ASSESSMENT — PAIN DESCRIPTION - DESCRIPTORS
DESCRIPTORS: SHARP
DESCRIPTORS: ACHING
DESCRIPTORS: SHARP

## 2024-11-13 ASSESSMENT — PAIN DESCRIPTION - ORIENTATION
ORIENTATION: RIGHT

## 2024-11-13 ASSESSMENT — PAIN - FUNCTIONAL ASSESSMENT: PAIN_FUNCTIONAL_ASSESSMENT: 0-10

## 2024-11-13 NOTE — CONSULTS
[facility] GENERAL AND LAPAROSCOPIC SURGERY                       PATIENT NAME: Lindsay Fay     ADMISSION DATE: 11/13/2024  6:14 AM      TODAY'S DATE: 11/13/2024    Reason for Consult:  Abd pain    Requesting Physician:  Dr. KIRSTEN Rosales    HISTORY OF PRESENT ILLNESS:              The patient is a 49 y.o. male who presents with and pain, right flank, back, and RLQ. Saw PCP, had UA with microscopic hematuria. Came to ER with severe pain, no relief yesterday, and was admitted. Had noncon CT - neg for appendicitis. Pt has had colonoscopy a couple years ago, says was OK - I'm not sure that is accurate. Had recent Z pack. No chronic abd pain or similar issues, no prior operations. No GI bleeding noted, states has had some diarrhea.    Past Medical History:        Diagnosis Date    Asthma     BPH associated with nocturia 8/9/2021    Hypertension     Mild intermittent asthma without complication 2/8/2022    Mixed hyperlipidemia 8/9/2021    Obesity (BMI 30.0-34.9) 8/9/2021    Prediabetes 10/20/2021       Past Surgical History:    History reviewed. No pertinent surgical history.    Current Medications:   Current Facility-Administered Medications: [START ON 11/14/2024] amLODIPine (NORVASC) tablet 10 mg, 10 mg, Oral, Daily  folic acid (FOLVITE) tablet 1 mg, 1 mg, Oral, Daily  tamsulosin (FLOMAX) capsule 0.4 mg, 0.4 mg, Oral, Daily  sodium chloride flush 0.9 % injection 5-40 mL, 5-40 mL, IntraVENous, 2 times per day  sodium chloride flush 0.9 % injection 5-40 mL, 5-40 mL, IntraVENous, PRN  0.9 % sodium chloride infusion, , IntraVENous, PRN  [START ON 11/14/2024] enoxaparin (LOVENOX) injection 40 mg, 40 mg, SubCUTAneous, Daily  polyethylene glycol (GLYCOLAX) packet 17 g, 17 g, Oral, Daily PRN  acetaminophen (TYLENOL) tablet 650 mg, 650 mg, Oral, Q6H PRN **OR** acetaminophen (TYLENOL) suppository 650 mg, 650 mg, Rectal, Q6H PRN  Prior to Admission medications    Medication Sig Start Date End Date Taking? Authorizing

## 2024-11-13 NOTE — PROGRESS NOTES
Pharmacy Home Medication Reconciliation Note    A medication reconciliation has been completed for Lindsay Fay 1975    Pharmacy: [facility] [address]  Information provided by: Patient and dispense report    The patient's home medication list is as follows:  No current facility-administered medications on file prior to encounter.     Current Outpatient Medications on File Prior to Encounter   Medication Sig Dispense Refill    amLODIPine (NORVASC) 10 MG tablet Take 1 tablet by mouth daily TAKE 1 TABLET BY MOUTH EVERY DAY (Patient taking differently: Take 1 tablet by mouth daily) 90 tablet 1    folic acid (FOLVITE) 1 MG tablet Take 1 tablet by mouth daily 90 tablet 1    rimegepant sulfate (NURTEC) 75 MG TBDP Take 75 mg by mouth daily as needed (for Migraine) 4 tablet 0    atorvastatin (LIPITOR) 10 MG tablet Take 1 tablet by mouth daily (Patient not taking: Reported on 11/13/2024) 90 tablet 1    tamsulosin (FLOMAX) 0.4 MG capsule Take 1 capsule by mouth daily (Patient not taking: Reported on 11/13/2024) 30 capsule 5       Patient is no longer taking Atorvastatin and tamsulosin     Timing of last doses updated.    Thank you,  Denice Brock  Pharmacy Student

## 2024-11-13 NOTE — PROGRESS NOTES
Patient seen in ED, room 05.  Admission completed with the following exceptions:  4 Eyes Assessment, Immunizations, Vaccines, Rights and Responsibilities, Orientation to room, Plan of Care, Education/Learning Assessment and Education Plan, white board, height and weight, pain assessment and head to toe assessment.  Patient is alert and oriented X 4.  Patient lives in a two story townhouse with his wife and child and is being admitted for abdominal pain.      Home Medication reconciliation has been completed by Pharmacy Staff.      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  He is interested in completing advance directives, Spiritual Care consult entered.  Due to the current pain and weakness, he is a high fall risk, yellow armband applied.    All patient's and/or family's questions answered.

## 2024-11-13 NOTE — ED PROVIDER NOTES
EMERGENCY DEPARTMENT PROVIDER NOTE    Patient Identification  Pt Name: Lindsay aFy  MRN: [id number]  Birthdate 1975  Date of evaluation: 11/13/2024  Provider: Harjit Wright DO  PCP: Elin Calvert MD    Chief Complaint  Abdominal Pain (Patient arrives through triage with complaints of right lower abdominal pain that radiates to his back. Reports having a fever a few days ago. Was seen by his PCP who said he needed a scan to confirm kidney stone. Patient reports dysuria as well. )      HPI  (History provided by patient and spouse/SO)  This is a 49 y.o. male with pertinent past medical history of prediabetes, hypertension who was brought in by family for right sided abdominal and flank pain.  Patient reports symptoms ongoing for the past 7 days.  Pain is sharp and aching, feels deep inside his abdomen, worsened with walking and movement.  Has been gradually worsening as well.  He was seen by his PCP yesterday and was told to urinalysis contained blood and that he likely had a kidney stone.  He reports chills, however no fevers.  Denies any chest pain, shortness of breath,  symptoms or lower extremity weakness/numbness.  Patient states he was told to obtain a CT scan by his PCP which was ordered outpatient, however can no longer wait due to the increasing severity of his pain.      I have reviewed the following nursing documentation:  Allergies: Lisinopril and Other    Past medical history:   Past Medical History:   Diagnosis Date    Asthma     BPH associated with nocturia 8/9/2021    Hypertension     Mild intermittent asthma without complication 2/8/2022    Mixed hyperlipidemia 8/9/2021    Obesity (BMI 30.0-34.9) 8/9/2021    Prediabetes 10/20/2021     Past surgical history: History reviewed. No pertinent surgical history.    Home medications:   Previous Medications    AMLODIPINE (NORVASC) 10 MG TABLET    Take 1 tablet by mouth daily TAKE 1 TABLET BY MOUTH EVERY DAY    ATORVASTATIN (LIPITOR) 10 MG TABLET     grossly intact bilateral lower extremities.  Skin: Warm and dry. No rash.  Psychiatric: Normal mood and affect. Behavior is normal.        Radiology  CT ABDOMEN PELVIS WO CONTRAST Additional Contrast? None   Final Result   1. No acute intra-abdominal or pelvic process.   2. Multiple bilateral nonobstructive kidney stones.             Any applicable radiology studies including x-ray, CT, MRI, and/or ultrasound, were reviewed independently by me in addition to the radiologist.  I reviewed all radiology images and reports as well from this evaluation.    Labs  Results for orders placed or performed during the hospital encounter of 11/13/24   CBC with Auto Differential   Result Value Ref Range    WBC 5.8 4.0 - 11.0 K/uL    RBC 5.18 4.20 - 5.90 M/uL    Hemoglobin 14.8 13.5 - 17.5 g/dL    Hematocrit 44.8 40.5 - 52.5 %    MCV 86.4 80.0 - 100.0 fL    MCH 28.7 26.0 - 34.0 pg    MCHC 33.1 31.0 - 36.0 g/dL    RDW 15.9 (H) 12.4 - 15.4 %    Platelets 359 135 - 450 K/uL    MPV 8.5 5.0 - 10.5 fL    Neutrophils % 44.8 %    Lymphocytes % 39.6 %    Monocytes % 10.7 %    Eosinophils % 3.8 %    Basophils % 1.1 %    Neutrophils Absolute 2.6 1.7 - 7.7 K/uL    Lymphocytes Absolute 2.3 1.0 - 5.1 K/uL    Monocytes Absolute 0.6 0.0 - 1.3 K/uL    Eosinophils Absolute 0.2 0.0 - 0.6 K/uL    Basophils Absolute 0.1 0.0 - 0.2 K/uL   CMP w/ Reflex to MG   Result Value Ref Range    Sodium 137 136 - 145 mmol/L    Potassium reflex Magnesium 3.9 3.5 - 5.1 mmol/L    Chloride 109 99 - 110 mmol/L    CO2 17 (L) 21 - 32 mmol/L    Anion Gap 11 3 - 16    Glucose 103 (H) 70 - 99 mg/dL    BUN 20 7 - 20 mg/dL    Creatinine 1.4 (H) 0.9 - 1.3 mg/dL    Est, Glom Filt Rate 61 >60    Calcium 8.8 8.3 - 10.6 mg/dL    Total Protein 7.4 6.4 - 8.2 g/dL    Albumin 3.9 3.4 - 5.0 g/dL    Albumin/Globulin Ratio 1.1 1.1 - 2.2    Total Bilirubin 0.3 0.0 - 1.0 mg/dL    Alkaline Phosphatase 96 40 - 129 U/L    ALT 17 10 - 40 U/L    AST 18 15 - 37 U/L   Lipase   Result Value Ref

## 2024-11-13 NOTE — H&P
HOSPITALISTS HISTORY AND PHYSICAL    11/13/2024 3:13 PM    Patient Information:  JOLIE FAY is a 49 y.o. male [id number]  PCP:  Elin Calvert MD (Tel: [phone] )    Chief complaint:    Chief Complaint   Patient presents with    Abdominal Pain     Patient arrives through triage with complaints of right lower abdominal pain that radiates to his back. Reports having a fever a few days ago. Was seen by his PCP who said he needed a scan to confirm kidney stone. Patient reports dysuria as well.        History of Present Illness:  Jolie Fay is a 49 y.o. male who presented to our hospital with 3 days hx of RLQ pain radiating to flank and back. + n/v, no fever or chills, no trauma to the region  Ct negative for appendicirtyt or stones  Wbc normal      REVIEW OF SYSTEMS:   Constitutional: Negative for fever,chills or night sweats  ENT: Negative for rhinorrhea, epistaxis, hoarseness, sore throat.  Respiratory: Negative for shortness of breath,wheezing  Cardiovascular: Negative for chest pain, palpitations   Genitourinary: Negative for polyuria, dysuria   Hematologic/Lymphatic: Negative for bleeding tendency, easy bruising  Musculoskeletal: Negative for myalgias and arthralgias  Neurologic: Negative for confusion,dysarthria.  Skin: Negative for itching,rash  Psychiatric: Negative for depression,anxiety, agitation.  Endocrine: Negative for polydipsia,polyuria,heat /cold intolerance.    Past Medical History:   has a past medical history of Asthma, BPH associated with nocturia, Hypertension, Mild intermittent asthma without complication, Mixed hyperlipidemia, Obesity (BMI 30.0-34.9), and Prediabetes.     Medications:  No current facility-administered medications on file prior to encounter.     Current Outpatient Medications on File Prior to Encounter   Medication Sig Dispense Refill    amLODIPine (NORVASC) 10 MG tablet Take 1  tablet by mouth daily TAKE 1 TABLET BY MOUTH EVERY DAY (Patient taking differently: Take 1 tablet by mouth daily) 90 tablet 1    folic acid (FOLVITE) 1 MG tablet Take 1 tablet by mouth daily 90 tablet 1    rimegepant sulfate (NURTEC) 75 MG TBDP Take 75 mg by mouth daily as needed (for Migraine) 4 tablet 0    atorvastatin (LIPITOR) 10 MG tablet Take 1 tablet by mouth daily (Patient not taking: Reported on 11/13/2024) 90 tablet 1    tamsulosin (FLOMAX) 0.4 MG capsule Take 1 capsule by mouth daily (Patient not taking: Reported on 11/13/2024) 30 capsule 5       Allergies:  Allergies   Allergen Reactions    Lisinopril Swelling     And cough        Social History:  Patient Lives at home   reports that he has never smoked. He has never used smokeless tobacco. He reports that he does not drink alcohol and does not use drugs.     Family History:  family history includes Cancer (age of onset: 33) in his paternal cousin; Cancer (age of onset: 39) in his paternal uncle and paternal uncle; Cancer (age of onset: 59) in his paternal aunt; Cancer (age of onset: 61) in his paternal aunt; Cancer (age of onset: 67) in his paternal aunt; Cancer (age of onset: 70) in his father; Diabetes in his maternal aunt, maternal grandfather, and maternal grandmother. ,   Physical Exam:  /87   Pulse 81   Temp 98.6 °F (37 °C) (Oral)   Resp 18   SpO2 98%     General appearance:  Appears comfortable. AAOx3  HEENT: atraumatic, Pupils equal, muscous membranes moist, no masses appreciated  Cardiovascular: Regular rate and rhythm no murmurs appreciated  Respiratory: CTAB no wheezing  Gastrointestinal: RLQ tendernesst o palatoin no gaurding   EXT: no edema  Neurology: no gross focal deficts  Psychiatry: Appropriate affect. Not agitated  Skin: Warm, dry, no rashes appreciated    Labs:  CBC:   Lab Results   Component Value Date/Time    WBC 5.8 11/13/2024 06:51 AM    RBC 5.18 11/13/2024 06:51 AM    HGB 14.8 11/13/2024 06:51 AM    HCT 44.8 11/13/2024

## 2024-11-13 NOTE — ACP (ADVANCE CARE PLANNING)
Advanced Care Planning Note.    Purpose of Encounter: Advanced care planning in light of hospitalization  Parties In Attendance: Patient,    Decisional Capacity: Yes  Subjective: Patient  understand that this conversation is to address long term care goal  Objective: admitted to [facility] with RLQ pain  Goals of Care Determination: Patient would pursue CPR and Intubation if required.  Code Status: full code  Time spent on Advanced care Planninminutes  Advanced Care Planning Documents: documented patient's wishes, would like wife Yeni to make medical decisions if unable to make decisions    Rodney Rosales MD  2024 3:15 PM

## 2024-11-13 NOTE — PROGRESS NOTES
4 Eyes Skin Assessment     NAME:  Lindsay Fay  YOB: 1975  MEDICAL RECORD NUMBER:  [id number]    The patient is being assessed for  Admission    I agree that at least one RN has performed a thorough Head to Toe Skin Assessment on the patient. ALL assessment sites listed below have been assessed.      Areas assessed by both nurses:    Head, Face, Ears, Shoulders, Back, Chest, Arms, Elbows, Hands, Sacrum. Buttock, Coccyx, Ischium, and Legs. Feet and Heels        Does the Patient have a Wound? No noted wound(s)       Robin Prevention initiated by RN: No  Wound Care Orders initiated by RN: No    Pressure Injury (Stage 3,4, Unstageable, DTI, NWPT, and Complex wounds) if present, place Wound referral order by RN under : No    New Ostomies, if present place, Ostomy referral order under : No     Nurse 1 eSignature: Electronically signed by Lanny Fang RN on 11/13/24 at 5:08 PM EST    **SHARE this note so that the co-signing nurse can place an eSignature**    Nurse 2 eSignature: Electronically signed by Elissa Gutierrez RN on 11/13/24 at 5:09 PM EST

## 2024-11-14 ENCOUNTER — APPOINTMENT (OUTPATIENT)
Dept: ULTRASOUND IMAGING | Age: 49
End: 2024-11-14
Payer: COMMERCIAL

## 2024-11-14 ENCOUNTER — APPOINTMENT (OUTPATIENT)
Dept: GENERAL RADIOLOGY | Age: 49
End: 2024-11-14
Payer: COMMERCIAL

## 2024-11-14 ENCOUNTER — TELEPHONE (OUTPATIENT)
Dept: INTERNAL MEDICINE CLINIC | Age: 49
End: 2024-11-14

## 2024-11-14 ENCOUNTER — APPOINTMENT (OUTPATIENT)
Dept: CT IMAGING | Age: 49
End: 2024-11-14
Payer: COMMERCIAL

## 2024-11-14 LAB
ANION GAP SERPL CALCULATED.3IONS-SCNC: 12 MMOL/L (ref 3–16)
BASOPHILS # BLD: 0 K/UL (ref 0–0.2)
BASOPHILS NFR BLD: 0.4 %
BUN SERPL-MCNC: 19 MG/DL (ref 7–20)
C DIFF TOX A+B STL QL IA: NORMAL
CALCIUM SERPL-MCNC: 9.1 MG/DL (ref 8.3–10.6)
CHLORIDE SERPL-SCNC: 106 MMOL/L (ref 99–110)
CO2 SERPL-SCNC: 19 MMOL/L (ref 21–32)
CREAT SERPL-MCNC: 1.3 MG/DL (ref 0.9–1.3)
DEPRECATED RDW RBC AUTO: 15.9 % (ref 12.4–15.4)
EOSINOPHIL # BLD: 0 K/UL (ref 0–0.6)
EOSINOPHIL NFR BLD: 0 %
GFR SERPLBLD CREATININE-BSD FMLA CKD-EPI: 66 ML/MIN/{1.73_M2}
GLUCOSE SERPL-MCNC: 116 MG/DL (ref 70–99)
HCT VFR BLD AUTO: 43.2 % (ref 40.5–52.5)
HGB BLD-MCNC: 14.3 G/DL (ref 13.5–17.5)
LYMPHOCYTES # BLD: 1.3 K/UL (ref 1–5.1)
LYMPHOCYTES NFR BLD: 20.3 %
MCH RBC QN AUTO: 28.1 PG (ref 26–34)
MCHC RBC AUTO-ENTMCNC: 33 G/DL (ref 31–36)
MCV RBC AUTO: 85.1 FL (ref 80–100)
MONOCYTES # BLD: 0.5 K/UL (ref 0–1.3)
MONOCYTES NFR BLD: 8.2 %
NEUTROPHILS # BLD: 4.7 K/UL (ref 1.7–7.7)
NEUTROPHILS NFR BLD: 71.1 %
PLATELET # BLD AUTO: 344 K/UL (ref 135–450)
PMV BLD AUTO: 7.8 FL (ref 5–10.5)
POTASSIUM SERPL-SCNC: 4.4 MMOL/L (ref 3.5–5.1)
RBC # BLD AUTO: 5.08 M/UL (ref 4.2–5.9)
REASON FOR REJECTION: NORMAL
REJECTED TEST: NORMAL
REPORT: NORMAL
RESP PATH DNA+RNA PNL NPH NAA+NON-PROBE: NORMAL
SARS-COV-2 RDRP RESP QL NAA+PROBE: NOT DETECTED
SODIUM SERPL-SCNC: 137 MMOL/L (ref 136–145)
WBC # BLD AUTO: 6.6 K/UL (ref 4–11)

## 2024-11-14 PROCEDURE — 85025 COMPLETE CBC W/AUTO DIFF WBC: CPT

## 2024-11-14 PROCEDURE — 87324 CLOSTRIDIUM AG IA: CPT

## 2024-11-14 PROCEDURE — 2580000003 HC RX 258: Performed by: INTERNAL MEDICINE

## 2024-11-14 PROCEDURE — 0202U NFCT DS 22 TRGT SARS-COV-2: CPT

## 2024-11-14 PROCEDURE — 6360000004 HC RX CONTRAST MEDICATION: Performed by: INTERNAL MEDICINE

## 2024-11-14 PROCEDURE — 87635 SARS-COV-2 COVID-19 AMP PRB: CPT

## 2024-11-14 PROCEDURE — 80048 BASIC METABOLIC PNL TOTAL CA: CPT

## 2024-11-14 PROCEDURE — 99232 SBSQ HOSP IP/OBS MODERATE 35: CPT | Performed by: SURGERY

## 2024-11-14 PROCEDURE — 74160 CT ABDOMEN W/CONTRAST: CPT

## 2024-11-14 PROCEDURE — 96375 TX/PRO/DX INJ NEW DRUG ADDON: CPT

## 2024-11-14 PROCEDURE — G0378 HOSPITAL OBSERVATION PER HR: HCPCS

## 2024-11-14 PROCEDURE — APPNB30 APP NON BILLABLE TIME 0-30 MINS: Performed by: NURSE PRACTITIONER

## 2024-11-14 PROCEDURE — 36415 COLL VENOUS BLD VENIPUNCTURE: CPT

## 2024-11-14 PROCEDURE — 6360000002 HC RX W HCPCS: Performed by: INTERNAL MEDICINE

## 2024-11-14 PROCEDURE — 6370000000 HC RX 637 (ALT 250 FOR IP): Performed by: NURSE PRACTITIONER

## 2024-11-14 PROCEDURE — 87449 NOS EACH ORGANISM AG IA: CPT

## 2024-11-14 PROCEDURE — 6370000000 HC RX 637 (ALT 250 FOR IP): Performed by: INTERNAL MEDICINE

## 2024-11-14 PROCEDURE — APPSS15 APP SPLIT SHARED TIME 0-15 MINUTES: Performed by: NURSE PRACTITIONER

## 2024-11-14 PROCEDURE — 76705 ECHO EXAM OF ABDOMEN: CPT

## 2024-11-14 PROCEDURE — 96376 TX/PRO/DX INJ SAME DRUG ADON: CPT

## 2024-11-14 PROCEDURE — 74018 RADEX ABDOMEN 1 VIEW: CPT

## 2024-11-14 RX ORDER — DIAZEPAM 5 MG/1
5 TABLET ORAL ONCE
Status: COMPLETED | OUTPATIENT
Start: 2024-11-14 | End: 2024-11-14

## 2024-11-14 RX ORDER — LORAZEPAM 2 MG/ML
1 INJECTION INTRAMUSCULAR ONCE
Status: DISCONTINUED | OUTPATIENT
Start: 2024-11-14 | End: 2024-11-14

## 2024-11-14 RX ORDER — IOPAMIDOL 755 MG/ML
75 INJECTION, SOLUTION INTRAVASCULAR
Status: COMPLETED | OUTPATIENT
Start: 2024-11-14 | End: 2024-11-14

## 2024-11-14 RX ADMIN — MORPHINE SULFATE 2 MG: 2 INJECTION, SOLUTION INTRAMUSCULAR; INTRAVENOUS at 21:05

## 2024-11-14 RX ADMIN — MORPHINE SULFATE 2 MG: 2 INJECTION, SOLUTION INTRAMUSCULAR; INTRAVENOUS at 15:27

## 2024-11-14 RX ADMIN — SODIUM CHLORIDE, PRESERVATIVE FREE 10 ML: 5 INJECTION INTRAVENOUS at 21:06

## 2024-11-14 RX ADMIN — SODIUM CHLORIDE, PRESERVATIVE FREE 10 ML: 5 INJECTION INTRAVENOUS at 09:08

## 2024-11-14 RX ADMIN — DIAZEPAM 5 MG: 5 TABLET ORAL at 12:49

## 2024-11-14 RX ADMIN — TAMSULOSIN HYDROCHLORIDE 0.4 MG: 0.4 CAPSULE ORAL at 09:00

## 2024-11-14 RX ADMIN — LACTULOSE 20 G: 10 SOLUTION ORAL at 09:00

## 2024-11-14 RX ADMIN — AMLODIPINE BESYLATE 10 MG: 5 TABLET ORAL at 08:59

## 2024-11-14 RX ADMIN — IOPAMIDOL 75 ML: 755 INJECTION, SOLUTION INTRAVENOUS at 14:49

## 2024-11-14 RX ADMIN — METHYLPREDNISOLONE SODIUM SUCCINATE 125 MG: 125 INJECTION INTRAMUSCULAR; INTRAVENOUS at 12:49

## 2024-11-14 RX ADMIN — MORPHINE SULFATE 2 MG: 2 INJECTION, SOLUTION INTRAMUSCULAR; INTRAVENOUS at 09:00

## 2024-11-14 RX ADMIN — FOLIC ACID 1 MG: 1 TABLET ORAL at 08:59

## 2024-11-14 RX ADMIN — SODIUM CHLORIDE, PRESERVATIVE FREE 10 ML: 5 INJECTION INTRAVENOUS at 04:15

## 2024-11-14 RX ADMIN — MORPHINE SULFATE 2 MG: 2 INJECTION, SOLUTION INTRAMUSCULAR; INTRAVENOUS at 04:15

## 2024-11-14 ASSESSMENT — PAIN SCALES - GENERAL
PAINLEVEL_OUTOF10: 8
PAINLEVEL_OUTOF10: 4
PAINLEVEL_OUTOF10: 10
PAINLEVEL_OUTOF10: 5

## 2024-11-14 ASSESSMENT — PAIN DESCRIPTION - LOCATION
LOCATION: BACK;FLANK
LOCATION: BACK

## 2024-11-14 ASSESSMENT — PAIN DESCRIPTION - DESCRIPTORS
DESCRIPTORS: ACHING

## 2024-11-14 ASSESSMENT — PAIN DESCRIPTION - ORIENTATION
ORIENTATION: RIGHT
ORIENTATION: RIGHT;LOWER

## 2024-11-14 ASSESSMENT — PAIN DESCRIPTION - FREQUENCY: FREQUENCY: CONTINUOUS

## 2024-11-14 ASSESSMENT — PAIN DESCRIPTION - ONSET: ONSET: ON-GOING

## 2024-11-14 ASSESSMENT — PAIN DESCRIPTION - PAIN TYPE: TYPE: ACUTE PAIN

## 2024-11-14 NOTE — PROGRESS NOTES
BMP:    Recent Labs     11/13/24  0651 11/14/24  0519    137   K 3.9 4.4    106   CO2 17* 19*   BUN 20 19   CREATININE 1.4* 1.3   GLUCOSE 103* 116*     Hepatic:   Recent Labs     11/13/24  0651   AST 18   ALT 17   BILITOT 0.3   ALKPHOS 96     US GALLBLADDER RUQ   Preliminary Result   Mild hepatic steatosis.         XR ABDOMEN (KUB) (SINGLE AP VIEW)   Final Result   1. Dominant stone in the left kidney.   2. No additional stones identified.         CT ABDOMEN PELVIS WO CONTRAST Additional Contrast? None   Final Result   1. No acute intra-abdominal or pelvic process.   2. Multiple bilateral nonobstructive kidney stones.             Recent imaging reviewed    Problem List  Principal Problem:    Abdominal pain  Active Problems:    Acute abdominal pain in right lower quadrant  Resolved Problems:    * No resolved hospital problems. *       Assessment & Plan:   Right MSK back pain  Iv steroids  Valium   Pt ot  RUQ reviewed negative  Discussed with gi      Diet: ADULT DIET; Clear Liquid  Code:Full Code        Rodney Rosales MD   11/14/2024 12:11 PM

## 2024-11-14 NOTE — CONSULTS
Urology Consult Note  [facility]     Patient: Lindsay Fay MRN: [id number]  Room/Bed: [facility]-3312/3312-01   YOB: 1975  Age/Sex: 49 y.o.male  Admission Date: 11/13/2024     Date of Service:  11/14/2024    Consulting Provider: NISHANT Lucio CNP   Admitting/Requesting Physician: Rodney Rosales MD  Primary Care Physician: Elin Calvert MD    Reason for Consult: Kidney Stone    ASSESSMENT/PLAN     48 yo male who is admitted for right sided flank pain, had trace blood on a UA with his pcp, this was not verified microscopically.  No micro heme noted on UA this admission. CT a/p in the ED shows a left 7.5mm mid pole renal pelvis stone without hydronephrosis. This same stone was approximately 5mm in 2018.     Recommendations:  Unlikely his left sided stone is contributing to his right sided pain, the stone is also non-obstructive, it is however quite large and has gotten larger since 2018, would recommend ESWL. Will get a kub today and outpatient follow up for microhematuria and his kidney stone.     All patient questions were answered. He understands the plan as listed above.    HISTORY     Chief Complaint:   Chief Complaint   Patient presents with    Abdominal Pain     Patient arrives through triage with complaints of right lower abdominal pain that radiates to his back. Reports having a fever a few days ago. Was seen by his PCP who said he needed a scan to confirm kidney stone. Patient reports dysuria as well.        History of Present Illness: Lindsay Fay is a 49 y.o. male with microscopic hematuria and a kidney stone. Onset of symptoms was days ago with improving course since that time. Symptoms are aggravated by unknown. Symptoms improved with pain medication. Associated symptoms include right flank pain. Patient also reports high oxalate diet. He has tried the following treatments: will get a kub today and outpt eswl.     Past Medical History:  He has a past medical history of

## 2024-11-14 NOTE — CARE COORDINATION
Case Management Assessment  Initial Evaluation    Date/Time of Evaluation: 11/14/2024 12:03 PM  Assessment Completed by: Pat Chow RN    If patient is discharged prior to next notation, then this note serves as note for discharge by case management.    Patient Name: Lindsay Fay                   YOB: 1975  Diagnosis: Abdominal pain [R10.9]  Acute abdominal pain in right lower quadrant [R10.31]  Intractable pain [R52]                   Date / Time: 11/13/2024  6:14 AM    Patient Admission Status: Observation   Readmission Risk (Low < 19, Mod (19-27), High > 27): No data recorded  Current PCP: Elin Calvert MD  PCP verified by CM? Yes    Chart Reviewed: Yes      History Provided by: Patient  Patient Orientation: Alert and Oriented    Patient Cognition: Alert    Hospitalization in the last 30 days (Readmission):  No    If yes, Readmission Assessment in  Navigator will be completed.    Advance Directives:      Code Status: Full Code   Patient's Primary Decision Maker is: Legal Next of Kin      Discharge Planning:    Patient lives with: Spouse/Significant Other Type of Home: House  Primary Care Giver: Self  Patient Support Systems include: Family Members, Spouse/Significant Other, Friends/Neighbors   Current Financial resources: Medicaid  Current community resources: None  Current services prior to admission: None            Current DME:              Type of Home Care services:  None    ADLS  Prior functional level: Independent in ADLs/IADLs  Current functional level: Independent in ADLs/IADLs    PT AM-PAC:   /24  OT AM-PAC:   /24    Family can provide assistance at DC: Yes  Would you like Case Management to discuss the discharge plan with any other family members/significant others, and if so, who? Yes (spouse)  Plans to Return to Present Housing: Yes  Potential Assistance needed at discharge: N/A            Potential DME:  DEFERRED  Patient expects to discharge to: House  Plan for  transportation at discharge:  family private car    Financial    Payor: [employer] / Plan: [facility] [place] [facility] / Product Type: *No Product type* /     Does insurance require precert for SNF: Yes    Potential assistance Purchasing Medications: No  Meds-to-Beds request:  no      [id number] - [place], [place] - [address] - P [phone] - F [phone] [address]  [facility] [address]  Phone: [phone] Fax: [phone]      Notes:    Factors facilitating achievement of predicted outcomes: Family support and Good insight into deficits    Barriers to discharge: Medical complications        Pat Chow RN  Case Management Department  [phone]

## 2024-11-14 NOTE — PROGRESS NOTES
Imaging:  I have personally reviewed the following films:    XR ABDOMEN (KUB) (SINGLE AP VIEW)    Result Date: 11/14/2024  EXAMINATION: ONE SUPINE XRAY VIEW(S) OF THE ABDOMEN 11/14/2024 10:15 am COMPARISON: 11/13/2024 CT HISTORY: ORDERING SYSTEM PROVIDED HISTORY: comment on if left sided stone is radiopaque- planning eswl TECHNOLOGIST PROVIDED HISTORY: Reason for exam:->comment on if left sided stone is radiopaque- planning eswl Reason for Exam: comment on if left sided stone is radiopaque- planning eswl FINDINGS: The dominant stone previously described in the left kidney is identified at the lower pole measuring approximately 6 mm on current radiograph.  The prior CT described multiple bilateral calculi that are nonobstructing, which are not seen on the current exam.  No suspicious calcifications along the course of the ureters.  Multiple vascular phleboliths noted incidentally in the pelvis.  Unremarkable bowel gas pattern.  No skeletal abnormality.     1. Dominant stone in the left kidney. 2. No additional stones identified.     US GALLBLADDER RUQ    Result Date: 11/14/2024  EXAMINATION: RIGHT UPPER QUADRANT ULTRASOUND 11/14/2024 9:22 am COMPARISON: CT 11/13/2024. HISTORY: ORDERING SYSTEM PROVIDED HISTORY: Abdominal pain TECHNOLOGIST PROVIDED HISTORY: Reason for exam:->Abdominal pain Reason for Exam: Abdominal pain Additional signs and symptoms: pt states epigastric pain intermittent every 3 mo x 10 yrs FINDINGS: LIVER: Limited evaluation of the left liver due to overlying bowel gas.  The visualized right liver demonstrates slightly increased echogenicity.  No focal hepatic lesion. BILIARY SYSTEM: Gallbladder is unremarkable without evidence of pericholecystic fluid, wall thickening or stones. Negative sonographic Amos's sign.  Common bile duct is within normal limits measuring 5-6 mm. RIGHT KIDNEY: The right kidney is grossly unremarkable without evidence of hydronephrosis. PANCREAS:  Pancreas not well  visualized. OTHER: No evidence of right upper quadrant ascites.     Mild hepatic steatosis.     CT ABDOMEN PELVIS WO CONTRAST Additional Contrast? None    Result Date: 11/13/2024  EXAMINATION: CT OF THE ABDOMEN AND PELVIS WITHOUT CONTRAST 11/13/2024 6:47 am TECHNIQUE: CT of the abdomen and pelvis was performed without the administration of intravenous contrast. Multiplanar reformatted images are provided for review. Automated exposure control, iterative reconstruction, and/or weight based adjustment of the mA/kV was utilized to reduce the radiation dose to as low as reasonably achievable. COMPARISON: None. HISTORY: ORDERING SYSTEM PROVIDED HISTORY: RLQ and low back pain TECHNOLOGIST PROVIDED HISTORY: Additional Contrast?->None Reason for exam:->RLQ and low back pain Decision Support Exception - unselect if not a suspected or confirmed emergency medical condition->Emergency Medical Condition (MA) Reason for Exam: RLQ and low back pain Relevant Medical/Surgical History: Abdominal Pain (Patient arrives through triage with complaints of right lower abdominal pain that radiates to his back. Reports having a fever a few days ago. Was seen by his PCP who said he needed a scan to confirm kidney stone. Patient reports dysuria as well. ) FINDINGS: Lower Chest:  Visualized portion of the lower chest demonstrates no acute abnormality. Organs:  Liver demonstrates no suspicious mass. There is no evidence of intrahepatic biliary ductal dilatation. The spleen, pancreas and adrenal glands are unremarkable. The kidneys demonstrate no evidence of hydronephrosis.  Multiple bilateral nonobstructive kidney stones the largest measures 7.5 mm is in the left kidney. GI/Bowel:  There is no evidence of bowel obstruction.  The appendix is normal.  Cyst the is no evidence of abnormal bowel wall thickening or distension. Pelvis:  No acute abnormality of the pelvis organs or bladder. Peritoneum/Retroperitoneum: No evidence of ascites or free air.

## 2024-11-14 NOTE — CONSULTS
Gastroenterology Consult Note        Patient: Lindsay Fay  : 1975  Acct#:      Date:  2024      1. Acute abdominal pain in right lower quadrant    2. Intractable pain        Subjective:       History of Present Illness  Patient is a 49 y.o.  male admitted with Abdominal pain [R10.9]  Acute abdominal pain in right lower quadrant [R10.31]  Intractable pain [R52] who is seen in consult for right lower back/RLQ pain.   H/o HLD, HTN. He presented to the ED yesterday with 7 day h/o right lower back/flank and RLQ pain. Pain is worse with movement and walking. He was seen by surgery and started on laxatives for possible constipation. No N/V.     Past Medical History:   Diagnosis Date    Asthma     BPH associated with nocturia 2021    Hypertension     Mild intermittent asthma without complication 2022    Mixed hyperlipidemia 2021    Obesity (BMI 30.0-34.9) 2021    Prediabetes 10/20/2021      History reviewed. No pertinent surgical history.   Past Endoscopic History: none    Admission Meds  No current facility-administered medications on file prior to encounter.     Current Outpatient Medications on File Prior to Encounter   Medication Sig Dispense Refill    amLODIPine (NORVASC) 10 MG tablet Take 1 tablet by mouth daily TAKE 1 TABLET BY MOUTH EVERY DAY (Patient taking differently: Take 1 tablet by mouth daily) 90 tablet 1    folic acid (FOLVITE) 1 MG tablet Take 1 tablet by mouth daily 90 tablet 1    rimegepant sulfate (NURTEC) 75 MG TBDP Take 75 mg by mouth daily as needed (for Migraine) 4 tablet 0    tamsulosin (FLOMAX) 0.4 MG capsule Take 1 capsule by mouth daily 30 capsule 5    atorvastatin (LIPITOR) 10 MG tablet Take 1 tablet by mouth daily (Patient not taking: Reported on 2024) 90 tablet 1        Allergies  Allergies   Allergen Reactions    Lisinopril Swelling     And cough      Social   Social History     Tobacco Use    Smoking status: Never

## 2024-11-14 NOTE — PROGRESS NOTES
Assessment complete. Pt A&Ox4, independent in the room. Wife (Yeni) bedside and supportive. VSSA with c/o pain of some pain in RLQ. All evening medications given. Bed locked,in lowest position. Bedside table and call light within reach. Pt informed of need for stool sample. All needs addressed. Plan of care ongoing.    0700- stool sample sent down to lab

## 2024-11-14 NOTE — TELEPHONE ENCOUNTER
Pt calling, wanted to let Dr. Calvert know he was admitted to the hospital on Tuesday 11/12 and they are keeping him again tonight. Pt states they found kidney stones and will be doing a pancreatic test, wanted Dr. Calvert to be aware of what is going on.

## 2024-11-15 VITALS
HEIGHT: 72 IN | OXYGEN SATURATION: 95 % | SYSTOLIC BLOOD PRESSURE: 151 MMHG | HEART RATE: 79 BPM | WEIGHT: 231 LBS | DIASTOLIC BLOOD PRESSURE: 93 MMHG | TEMPERATURE: 98 F | RESPIRATION RATE: 16 BRPM | BODY MASS INDEX: 31.29 KG/M2

## 2024-11-15 LAB
ANION GAP SERPL CALCULATED.3IONS-SCNC: 10 MMOL/L (ref 3–16)
BASOPHILS # BLD: 0 K/UL (ref 0–0.2)
BASOPHILS NFR BLD: 0.4 %
BUN SERPL-MCNC: 13 MG/DL (ref 7–20)
CALCIUM SERPL-MCNC: 8.8 MG/DL (ref 8.3–10.6)
CHLORIDE SERPL-SCNC: 108 MMOL/L (ref 99–110)
CO2 SERPL-SCNC: 20 MMOL/L (ref 21–32)
CREAT SERPL-MCNC: 1.1 MG/DL (ref 0.9–1.3)
DEPRECATED RDW RBC AUTO: 15.7 % (ref 12.4–15.4)
EOSINOPHIL # BLD: 0 K/UL (ref 0–0.6)
EOSINOPHIL NFR BLD: 0 %
GFR SERPLBLD CREATININE-BSD FMLA CKD-EPI: 81 ML/MIN/{1.73_M2}
GLUCOSE SERPL-MCNC: 111 MG/DL (ref 70–99)
HCT VFR BLD AUTO: 42 % (ref 40.5–52.5)
HGB BLD-MCNC: 14.3 G/DL (ref 13.5–17.5)
LYMPHOCYTES # BLD: 1.6 K/UL (ref 1–5.1)
LYMPHOCYTES NFR BLD: 18.5 %
MCH RBC QN AUTO: 29.1 PG (ref 26–34)
MCHC RBC AUTO-ENTMCNC: 34 G/DL (ref 31–36)
MCV RBC AUTO: 85.6 FL (ref 80–100)
MONOCYTES # BLD: 0.6 K/UL (ref 0–1.3)
MONOCYTES NFR BLD: 7 %
NEUTROPHILS # BLD: 6.6 K/UL (ref 1.7–7.7)
NEUTROPHILS NFR BLD: 74.1 %
PLATELET # BLD AUTO: 350 K/UL (ref 135–450)
PMV BLD AUTO: 7.8 FL (ref 5–10.5)
POTASSIUM SERPL-SCNC: 4.3 MMOL/L (ref 3.5–5.1)
RBC # BLD AUTO: 4.91 M/UL (ref 4.2–5.9)
SODIUM SERPL-SCNC: 138 MMOL/L (ref 136–145)
WBC # BLD AUTO: 8.9 K/UL (ref 4–11)

## 2024-11-15 PROCEDURE — 80048 BASIC METABOLIC PNL TOTAL CA: CPT

## 2024-11-15 PROCEDURE — G0378 HOSPITAL OBSERVATION PER HR: HCPCS

## 2024-11-15 PROCEDURE — 99232 SBSQ HOSP IP/OBS MODERATE 35: CPT | Performed by: SURGERY

## 2024-11-15 PROCEDURE — 97110 THERAPEUTIC EXERCISES: CPT

## 2024-11-15 PROCEDURE — 96376 TX/PRO/DX INJ SAME DRUG ADON: CPT

## 2024-11-15 PROCEDURE — 36415 COLL VENOUS BLD VENIPUNCTURE: CPT

## 2024-11-15 PROCEDURE — 6370000000 HC RX 637 (ALT 250 FOR IP): Performed by: INTERNAL MEDICINE

## 2024-11-15 PROCEDURE — 6360000002 HC RX W HCPCS: Performed by: INTERNAL MEDICINE

## 2024-11-15 PROCEDURE — 97116 GAIT TRAINING THERAPY: CPT

## 2024-11-15 PROCEDURE — APPSS15 APP SPLIT SHARED TIME 0-15 MINUTES: Performed by: NURSE PRACTITIONER

## 2024-11-15 PROCEDURE — 85025 COMPLETE CBC W/AUTO DIFF WBC: CPT

## 2024-11-15 PROCEDURE — 97162 PT EVAL MOD COMPLEX 30 MIN: CPT

## 2024-11-15 PROCEDURE — 2580000003 HC RX 258: Performed by: INTERNAL MEDICINE

## 2024-11-15 PROCEDURE — APPNB30 APP NON BILLABLE TIME 0-30 MINS: Performed by: NURSE PRACTITIONER

## 2024-11-15 PROCEDURE — 97530 THERAPEUTIC ACTIVITIES: CPT

## 2024-11-15 RX ORDER — CYCLOBENZAPRINE HCL 5 MG
5 TABLET ORAL 2 TIMES DAILY PRN
Qty: 10 TABLET | Refills: 0 | Status: SHIPPED | OUTPATIENT
Start: 2024-11-15 | End: 2024-11-20

## 2024-11-15 RX ORDER — PREDNISONE 20 MG/1
40 TABLET ORAL DAILY
Qty: 6 TABLET | Refills: 0 | Status: SHIPPED | OUTPATIENT
Start: 2024-11-15 | End: 2024-11-18

## 2024-11-15 RX ADMIN — SODIUM CHLORIDE, PRESERVATIVE FREE 10 ML: 5 INJECTION INTRAVENOUS at 09:20

## 2024-11-15 RX ADMIN — MORPHINE SULFATE 2 MG: 2 INJECTION, SOLUTION INTRAMUSCULAR; INTRAVENOUS at 09:50

## 2024-11-15 RX ADMIN — TAMSULOSIN HYDROCHLORIDE 0.4 MG: 0.4 CAPSULE ORAL at 09:20

## 2024-11-15 RX ADMIN — AMLODIPINE BESYLATE 10 MG: 5 TABLET ORAL at 09:19

## 2024-11-15 RX ADMIN — MORPHINE SULFATE 2 MG: 2 INJECTION, SOLUTION INTRAMUSCULAR; INTRAVENOUS at 05:38

## 2024-11-15 RX ADMIN — FOLIC ACID 1 MG: 1 TABLET ORAL at 09:20

## 2024-11-15 ASSESSMENT — PAIN SCALES - GENERAL
PAINLEVEL_OUTOF10: 4
PAINLEVEL_OUTOF10: 7
PAINLEVEL_OUTOF10: 10
PAINLEVEL_OUTOF10: 10

## 2024-11-15 ASSESSMENT — PAIN DESCRIPTION - DESCRIPTORS
DESCRIPTORS: SHARP;SHOOTING
DESCRIPTORS: ACHING
DESCRIPTORS: SHARP;SHOOTING

## 2024-11-15 ASSESSMENT — PAIN DESCRIPTION - FREQUENCY: FREQUENCY: CONTINUOUS

## 2024-11-15 ASSESSMENT — PAIN DESCRIPTION - ORIENTATION
ORIENTATION: RIGHT
ORIENTATION: RIGHT
ORIENTATION: MID

## 2024-11-15 ASSESSMENT — PAIN DESCRIPTION - LOCATION
LOCATION: BACK
LOCATION: ABDOMEN;BACK
LOCATION: ABDOMEN;BACK

## 2024-11-15 ASSESSMENT — PAIN - FUNCTIONAL ASSESSMENT: PAIN_FUNCTIONAL_ASSESSMENT: PREVENTS OR INTERFERES SOME ACTIVE ACTIVITIES AND ADLS

## 2024-11-15 ASSESSMENT — PAIN DESCRIPTION - PAIN TYPE: TYPE: ACUTE PAIN

## 2024-11-15 ASSESSMENT — PAIN DESCRIPTION - ONSET: ONSET: ON-GOING

## 2024-11-15 NOTE — PLAN OF CARE
Problem: Skin/Tissue Integrity  Goal: Absence of new skin breakdown  Description: 1.  Monitor for areas of redness and/or skin breakdown  2.  Assess vascular access sites hourly  3.  Every 4-6 hours minimum:  Change oxygen saturation probe site  4.  Every 4-6 hours:  If on nasal continuous positive airway pressure, respiratory therapy assess nares and determine need for appliance change or resting period.  11/14/2024 2330 by Suzanne Mayfield, RN  Outcome: Progressing       Problem: Discharge Planning  Goal: Discharge to home or other facility with appropriate resources  11/14/2024 2330 by Suzanne Mayfield, RN  Outcome: Progressing  g     Problem: Pain  Goal: Verbalizes/displays adequate comfort level or baseline comfort level  11/14/2024 2330 by Suzanne Mayfield, RN  Outcome: Progressing

## 2024-11-15 NOTE — PROGRESS NOTES
Pain remains 10/10. PRN pain medication now available and administered at this time, see MAR.    Praveen Jeffries RN, BSN

## 2024-11-15 NOTE — PLAN OF CARE
Problem: Skin/Tissue Integrity  Goal: Absence of new skin breakdown  Description: 1.  Monitor for areas of redness and/or skin breakdown  2.  Assess vascular access sites hourly  3.  Every 4-6 hours minimum:  Change oxygen saturation probe site  4.  Every 4-6 hours:  If on nasal continuous positive airway pressure, respiratory therapy assess nares and determine need for appliance change or resting period.  11/15/2024 1443 by Praveen Jeffries RN  Outcome: Adequate for Discharge  11/15/2024 1140 by Praveen Jeffries RN  Outcome: Progressing     Problem: Discharge Planning  Goal: Discharge to home or other facility with appropriate resources  11/15/2024 1443 by Praveen Jeffries RN  Outcome: Adequate for Discharge  11/15/2024 1140 by Praveen Jeffries RN  Outcome: Progressing     Problem: Pain  Goal: Verbalizes/displays adequate comfort level or baseline comfort level  11/15/2024 1443 by Praveen Jeffries RN  Outcome: Adequate for Discharge  11/15/2024 1140 by Parveen Jeffries RN  Outcome: Progressing     Problem: Gastrointestinal - Adult  Goal: Minimal or absence of nausea and vomiting  11/15/2024 1443 by Praveen Jeffries RN  Outcome: Adequate for Discharge  11/15/2024 1140 by Praveen Jeffries RN  Outcome: Progressing  Goal: Maintains or returns to baseline bowel function  11/15/2024 1443 by Praveen Jeffries RN  Outcome: Adequate for Discharge  11/15/2024 1140 by Praveen Jeffries RN  Outcome: Progressing  Goal: Maintains adequate nutritional intake  11/15/2024 1443 by Praveen Jeffries RN  Outcome: Adequate for Discharge  11/15/2024 1140 by Praveen Jeffries RN  Outcome: Progressing     Problem: Genitourinary - Adult  Goal: Absence of urinary retention  11/15/2024 1443 by Praveen Jeffries RN  Outcome: Adequate for Discharge  11/15/2024 1140 by Praveen Jeffries RN  Outcome: Progressing

## 2024-11-15 NOTE — ACP (ADVANCE CARE PLANNING)
Advance Care Planning     Advance Care Planning Inpatient Note  Spiritual Care Department    Today's Date: 11/15/2024  Unit: [facility] 3A NURSING    Received request from IDT Member, Hilary Gutierrez RN.  Upon review of chart and communication with care team, patient's decision making abilities are not in question.. Patient was/were present in the room during visit.    Goals of ACP Conversation:  Discuss advance care planning documents  Facilitate a discussion related to patient's goals of care as they align with the patient's values and beliefs.    Health Care Decision Makers:     No healthcare decision makers have been documented.    Summary:  No Decision Maker named by patient at this time    Advance Care Planning Documents (Patient Wishes):  None     Assessment:  Spiritual Care consult to facilitate completion of Health Care Power of  and Living Will. Pt was awake and alert. Pt was given blank copies of POA/LW documents to review/complete when appropriate. Pt will notify the nurse to contact Spiritual Care Services to witness pt sign completed POA/LW documents.   Provided support through active listening, affirmed feelings, thoughts, concerns, conversation and blessing. Pt expressed hope and gratitude.    Interventions:  Provided education on documents for clarity and greater understanding  Discussed and provided education on state decision maker hierarchy  Encouraged ongoing ACP conversation with future decision makers and loved ones  Reviewed but did not complete ACP document    Care Preferences Communicated:   No    Outcomes/Plan:  ACP Discussion: Postponed. Pt will notify the nurse when appropriate.    Electronically signed by Chaplain Davon on 11/15/2024 at 9:45 AM

## 2024-11-15 NOTE — DISCHARGE INSTR - COC
Continuity of Care Form    Patient Name: Lindsay Fay   :  1975  MRN:  [id number]    Admit date:  2024  Discharge date:  11/15/24    Code Status Order: Full Code   Advance Directives:   Advance Care Flowsheet Documentation             Admitting Physician:  Rodney Rosales MD  PCP: Elin Calvert MD    Discharging Nurse: ***  Discharging Hospital Unit/Room#: 3AN-3303/3303-01  Discharging Unit Phone Number: ***    Emergency Contact:   Extended Emergency Contact Information  Primary Emergency Contact: Yeni Fay   [facility]  Home Phone: [phone]  Work Phone: [phone]  Mobile Phone: [phone]  Relation: Spouse    Past Surgical History:  History reviewed. No pertinent surgical history.    Immunization History:     There is no immunization history on file for this patient.    Active Problems:  Patient Active Problem List   Diagnosis Code    HTN (hypertension), benign I10    Renal insufficiency N28.9    Vertebral artery stenosis, left I65.02    Mixed hyperlipidemia E78.2    Obesity (BMI 30.0-34.9) E66.811    BPH associated with nocturia N40.1, R35.1    Prediabetes R73.03    Mild intermittent asthma without complication J45.20    Abdominal pain R10.9    Acute abdominal pain in right lower quadrant R10.31       Isolation/Infection:   Isolation            No Isolation          Patient Infection Status       Infection Onset Added Last Indicated Last Indicated By Review Planned Expiration Resolved Resolved By    None active    Resolved    COVID-19 21 COVID-19   21 Infection                        Nurse Assessment:  Last Vital Signs: BP (!) 151/93   Pulse 79   Temp 98 °F (36.7 °C) (Oral)   Resp 16   Ht 1.829 m (6')   Wt 104.8 kg (231 lb)   SpO2 95%   BMI 31.33 kg/m²     Last documented pain score (0-10 scale): Pain Level: 4  Last Weight:   Wt Readings from Last 1 Encounters:   11/15/24 104.8 kg (231 lb)     Mental Status:  {IP PT MENTAL

## 2024-11-15 NOTE — PROGRESS NOTES
[facility] - Inpatient Rehabilitation Department   Phone: [phone]    Physical Therapy    [x] Initial Evaluation            [] Daily Treatment Note         [] Discharge Summary      Patient: Lindsay Fay   : 1975   MRN: [id number]   Date of Service:  11/15/2024  Admitting Diagnosis: Abdominal pain  Current Admission Summary: This is a 49 y.o. male with pertinent past medical history of prediabetes, hypertension who was brought in by family for right sided abdominal and flank pain.  Patient reports symptoms ongoing for the past 7 days.  Pain is sharp and aching, feels deep inside his abdomen, worsened with walking and movement.  Has been gradually worsening as well.  He was seen by his PCP yesterday and was told to urinalysis contained blood and that he likely had a kidney stone.  He reports chills, however no fevers.  Denies any chest pain, shortness of breath,  symptoms or lower extremity weakness/numbness.  Patient states he was told to obtain a CT scan by his PCP which was ordered outpatient, however can no longer wait due to the increasing severity of his pain.   Past Medical History:  has a past medical history of Asthma, BPH associated with nocturia, Hypertension, Mild intermittent asthma without complication, Mixed hyperlipidemia, Obesity (BMI 30.0-34.9), and Prediabetes.  Past Surgical History:  has no past surgical history on file.  Discharge Recommendations: Lindsay Fay scored a 18/24 on the AM-PAC short mobility form. Current research shows that an AM-PAC score of 18 or greater is typically associated with a discharge to the patient's home setting. Based on the patient's AM-PAC score and their current functional mobility deficits, it is recommended that the patient have 2-3 sessions per week of Physical Therapy at d/c to increase the patient's independence.  At this time, this patient demonstrates the endurance and safety to discharge home with OPPT and a follow up treatment  use of UE support  Static Standing Balance: fair (-): maintains balance at SBA with use of UE support  Dynamic Standing Balance: fair (-): maintains balance at SBA with use of UE support  Comments: Pt safe with mobility with or without RW.     Other Therapeutic Interventions  Pt instructed in log roll for all bed mobility. Pt amb to bathroom and stood to urinate, and good balance for hand hygiene. Pt using RW for remainder of walking. Pt returned to supine in bed for low back there ex including single knee to chest, double knee to chest, hamstring stretch, piriformis stretch x 2 each bilat. Pt given photo handout for HEP. Discussed LB issues, chronic and acute and DC recommendations. Pt positioned in chair for postural support and comfort with needs placed in reach.    Functional Outcomes  AM-PAC Inpatient Mobility Raw Score : 18              Cognition  WFL  Orientation:    alert and oriented x 4  Command Following:   WFL    Education  Barriers To Learning: none  Patient Education: patient educated on PT role and benefits, plan of care, HEP, functional mobility training, proper use of assistive device/equipment, disease specific education, injury prevention, transfer training, discharge recommendations  Learning Assessment:  patient verbalizes and demonstrates understanding    Assessment  Activity Tolerance: Limited by pain  Impairments Requiring Therapeutic Intervention: decreased functional mobility, decreased strength, decreased balance, increased pain, decreased posture  Prognosis: good  Clinical Assessment: Pt hospitalized with severe back and side pain. Pt was independent with all mobility without use of AD prior to this, now is needing RW and SBA for mobility due to pain. Patient likely with severe back inflammation from forceful coughing/forward flexing the last 2 weeks. Pt would benefit from skilled PT services to address these issues.  Safety Interventions: patient left in chair, call light within reach,

## 2024-11-15 NOTE — DISCHARGE SUMMARY
Hospital Medicine Discharge Summary    Patient: Lindsay Fay     Gender: male  : 1975   Age: 49 y.o.  MRN: [id number]    Admitting Physician: Rodney Rosales MD  Discharge Physician: Rodney Rosales MD     Code Status: Full Code    Admit Date: 2024   Discharge Date:   11/15/2024    Disposition:  Home  Time spent arranging discharge: 31 minutes    Discharge Diagnoses:    Active Hospital Problems    Diagnosis Date Noted    Abdominal pain [R10.9] 2024    Acute abdominal pain in right lower quadrant [R10.31] 2024   Back pain        Condition at Discharge:  Stable    Hospital Course:    Hospital with abdominal pain and back pain underwent CT abdomen which was negative right upper quadrant ultrasound negative seen by GI and surgery likely muscular pain patient pain improved with IV steroids and muscle relaxants will be discharged on course of steroids and Flexeril with PT and OT    Discharge Exam:    BP (!) 151/93   Pulse 79   Temp 98 °F (36.7 °C) (Oral)   Resp 16   Ht 1.829 m (6')   Wt 104.8 kg (231 lb)   SpO2 95%   BMI 31.33 kg/m²   General appearance:  Appears comfortable. AAOx3  HEENT: atraumatic, Pupils equal, muscous membranes moist, no masses appreciated  Cardiovascular: Regular rate and rhythm no murmurs appreciated  Respiratory: CTAB no wheezing  Gastrointestinal: Abdomen soft, non-tender, BS+  EXT: no edema  Neurology: no gross focal deficts  Psychiatry: Appropriate affect. Not agitated  Skin: Warm, dry, no rashes appreciated    Discharge Medications:   Current Discharge Medication List        Current Discharge Medication List        Current Discharge Medication List        CONTINUE these medications which have NOT CHANGED    Details   amLODIPine (NORVASC) 10 MG tablet Take 1 tablet by mouth daily TAKE 1 TABLET BY MOUTH EVERY DAY  Qty: 90 tablet, Refills: 1    Comments: Schedule your follow up appointment.no further refills.  Associated Diagnoses: HTN (hypertension),  Automated exposure control, iterative reconstruction, and/or weight based adjustment of the mA/kV was utilized to reduce the radiation dose to as low as reasonably achievable. COMPARISON: None. HISTORY: ORDERING SYSTEM PROVIDED HISTORY: RLQ and low back pain TECHNOLOGIST PROVIDED HISTORY: Additional Contrast?->None Reason for exam:->RLQ and low back pain Decision Support Exception - unselect if not a suspected or confirmed emergency medical condition->Emergency Medical Condition (MA) Reason for Exam: RLQ and low back pain Relevant Medical/Surgical History: Abdominal Pain (Patient arrives through triage with complaints of right lower abdominal pain that radiates to his back. Reports having a fever a few days ago. Was seen by his PCP who said he needed a scan to confirm kidney stone. Patient reports dysuria as well. ) FINDINGS: Lower Chest:  Visualized portion of the lower chest demonstrates no acute abnormality. Organs:  Liver demonstrates no suspicious mass. There is no evidence of intrahepatic biliary ductal dilatation. The spleen, pancreas and adrenal glands are unremarkable. The kidneys demonstrate no evidence of hydronephrosis.  Multiple bilateral nonobstructive kidney stones the largest measures 7.5 mm is in the left kidney. GI/Bowel:  There is no evidence of bowel obstruction.  The appendix is normal.  Cyst the is no evidence of abnormal bowel wall thickening or distension. Pelvis:  No acute abnormality of the pelvis organs or bladder. Peritoneum/Retroperitoneum: No evidence of ascites or free air. Bones/Soft Tissues: Unremarkable     1. No acute intra-abdominal or pelvic process. 2. Multiple bilateral nonobstructive kidney stones.         Signed:    Rodney Rosales MD   11/15/2024

## 2024-11-15 NOTE — PROGRESS NOTES
Shift assessment completed. Routine vitals stable. Scheduled medications given. Patient is awake, alert and oriented x4. Respirations are easy and unlabored. Patient does not appear to be in distress, resting comfortably at this time. Call light within reach. Denies needs at this time. Pain 10/10 PRN not available at this time, see MAR.    Praveen Jeffries RN, BSN

## 2024-11-15 NOTE — PROGRESS NOTES
This patient has had a discharge order placed. They are returning home and being picked up in the lobby. Discharge paperwork has been printed, highlighted, and gone over with the patient by this RN. Patient understands teaching and has no further questions at this time. IV has been removed with no complications. Telemetry has been removed. Pt has all belongings present.      Praveen Jeffries RN, BSN

## 2024-11-15 NOTE — PROGRESS NOTES
Urology Progress Note  [facility]     Patient: Lindsay Fay MRN: [id number]  Room/Bed: 3AN-3303/3303-01   YOB: 1975  Age/Sex: 49 y.o.male  Admission Date: 11/13/2024     Date of Service:  11/15/2024    ASSESSMENT/PLAN     1. Acute abdominal pain in right lower quadrant    2. Intractable pain      48 yo male who is admitted for right sided flank pain, had trace blood on a UA with his pcp, this was not verified microscopically. No micro heme noted on UA this admission. CT a/p in the ED shows a left 7.5mm mid pole renal pelvis stone without hydronephrosis. This same stone was approximately 5mm in 2018.   ==  KUB shows dominant left renal stone  Afvss. Cr wnl. No leukocytosis     Recommendations:  Stones are non obstructive, highly unlikely that left sided stone is contributing to his right sided pain. Stone apparent on KUB, good left ESWL candidate. Outpatient follow up requested. Call with changes in clinical course and  will be happy to assist otherwise will sign out.     All patient questions were answered. He understands the plan as listed above.    SUBJECTIVE     Chief Complaint:   Chief Complaint   Patient presents with    Abdominal Pain     Patient arrives through triage with complaints of right lower abdominal pain that radiates to his back. Reports having a fever a few days ago. Was seen by his PCP who said he needed a scan to confirm kidney stone. Patient reports dysuria as well.        24 Hour Events: No acute  events    OBJECTIVE     Hospital Problem List:  Principal Problem:    Abdominal pain  Active Problems:    Acute abdominal pain in right lower quadrant  Resolved Problems:    * No resolved hospital problems. *      Physical Exam:  Vitals:    11/15/24 0608   BP:    Pulse:    Resp: 16   Temp:    SpO2:      CONSTITUTIONAL: The patient is well nourished/developed, with no distress noted.   NEUROLOGICAL/PSYCHIATRIC: Oriented to place and time, normal affected noted.  TECHNOLOGIST PROVIDED HISTORY: Reason for exam:->comment on if left sided stone is radiopaque- planning eswl Reason for Exam: comment on if left sided stone is radiopaque- planning eswl FINDINGS: The dominant stone previously described in the left kidney is identified at the lower pole measuring approximately 6 mm on current radiograph.  The prior CT described multiple bilateral calculi that are nonobstructing, which are not seen on the current exam.  No suspicious calcifications along the course of the ureters.  Multiple vascular phleboliths noted incidentally in the pelvis.  Unremarkable bowel gas pattern.  No skeletal abnormality.     1. Dominant stone in the left kidney. 2. No additional stones identified.     US GALLBLADDER RUQ    Result Date: 11/14/2024  EXAMINATION: RIGHT UPPER QUADRANT ULTRASOUND 11/14/2024 9:22 am COMPARISON: CT 11/13/2024. HISTORY: ORDERING SYSTEM PROVIDED HISTORY: Abdominal pain TECHNOLOGIST PROVIDED HISTORY: Reason for exam:->Abdominal pain Reason for Exam: Abdominal pain Additional signs and symptoms: pt states epigastric pain intermittent every 3 mo x 10 yrs FINDINGS: LIVER: Limited evaluation of the left liver due to overlying bowel gas.  The visualized right liver demonstrates slightly increased echogenicity.  No focal hepatic lesion. BILIARY SYSTEM: Gallbladder is unremarkable without evidence of pericholecystic fluid, wall thickening or stones. Negative sonographic Amos's sign.  Common bile duct is within normal limits measuring 5-6 mm. RIGHT KIDNEY: The right kidney is grossly unremarkable without evidence of hydronephrosis. PANCREAS:  Pancreas not well visualized. OTHER: No evidence of right upper quadrant ascites.     Mild hepatic steatosis.     CT ABDOMEN PELVIS WO CONTRAST Additional Contrast? None    Result Date: 11/13/2024  EXAMINATION: CT OF THE ABDOMEN AND PELVIS WITHOUT CONTRAST 11/13/2024 6:47 am TECHNIQUE: CT of the abdomen and pelvis was performed without the

## 2024-11-15 NOTE — PROGRESS NOTES
Gastroenterology Progress Note      Lindsay Fay is a 49 y.o. male patient.  1. Acute abdominal pain in right lower quadrant    2. Intractable pain        SUBJECTIVE: A little less right back/right lower quadrant pain.  Still worse with movement.      Physical    VITALS:  BP (!) 151/93   Pulse 79   Temp 98 °F (36.7 °C) (Oral)   Resp 16   Ht 1.829 m (6')   Wt 104.8 kg (231 lb)   SpO2 95%   BMI 31.33 kg/m²   TEMPERATURE:  Current - Temp: 98 °F (36.7 °C); Max - Temp  Av.9 °F (36.6 °C)  Min: 97.7 °F (36.5 °C)  Max: 98 °F (36.7 °C)    Constitutional: Alert and oriented x 4. No acute distress.   Respiratory: Respirations nonlabored, no crepitus  GI: Abdomen nondistended, soft, and nontender.    Neurological: No focal deficits noted. No asterixis.     Data    Data Review:    Recent Labs     24  0651 24  0519 11/15/24  0549   WBC 5.8 6.6 8.9   HGB 14.8 14.3 14.3   HCT 44.8 43.2 42.0   MCV 86.4 85.1 85.6    344 350     Recent Labs     24  0651 24  0519 11/15/24  0549    137 138   K 3.9 4.4 4.3    106 108   CO2 17* 19* 20*   BUN 20 19 13   CREATININE 1.4* 1.3 1.1     Recent Labs     24  0651   AST 18   ALT 17   BILITOT 0.3   ALKPHOS 96     Recent Labs     24  0651   LIPASE 41.0     No results for input(s): \"PROTIME\", \"INR\" in the last 72 hours.  Invalid input(s): \"PTT\"    Radiology Review:  CT abd pancreatic protocol 24  IMPRESSION:  1. No acute process.  2. No abnormality of the pancreas.      ASSESSMENT / PLAN      Right lower back/RLQ pain - began 7 days PTA. Occurred after a lot of coughing. Pain is worse with movement. Suspect pain is musculoskeletal. No acute findings on CT abd/pelvis.    - treatment of muskuloskeletal pain per primary.     Strong family h/o pancreatic cancer - CT abd pancreatic protocol was ok.    Colon cancer screening   - rec outpatient screening colonoscopy     Will sign off. Please call if questions.    Discussed

## 2024-11-15 NOTE — CARE COORDINATION
DISCHARGE ORDER  Date/Time 11/15/2024 3:16 PM  Completed by: Teresa Boeck, RN, Case Management    Patient Name: Lindsay Fay      : 1975  Admitting Diagnosis: Abdominal pain [R10.9]  Acute abdominal pain in right lower quadrant [R10.31]  Intractable pain [R52]      Admit order Date and Status:24  (verify MD's last order for status of admission)      Noted discharge order.   If applicable PT/OT recommendation at Discharge: Home PT  DME recommendation by PT/OT:  Confirmed discharge plan: Yes  with whom patient  If pt confirmed DC plan does family need to be contacted by CM No if yes who______  Discharge Plan: The patient will discharge to home with [facility] PT.     Date of Last IMM Given: NA    Reviewed chart.  Role of discharge planner explained and patient verbalized understanding. Discharge order is noted.    Has Home O2 in place on admit:  No  Informed of need to bring portable home O2 tank on day of discharge for nursing to connect prior to leaving:   Not Indicated  Verbalized agreement/Understanding:   Not Indicated  Pt is being d/c'd to home today. Pt's O2 sats are 95% on RA.    Discharge timeout done with Praveen GONZALEZ. All discharge needs and concerns addressed.

## 2024-11-15 NOTE — PROGRESS NOTES
Occupational Therapy  Lindsay Fay    Attempted to see patient this PM. Patient in bed laying on right side   Explained purpose of Occupational Therapy.   Patient stated did not feel like he needed OT at this time, stated wife will help with LB ADLs if needed.   Patient did fully participate in PT evaluation, see PT evaluation for mobility and transfers.     Will d/c OT evaluation orders at this time due to patient declining evaluation.    Thank you,  Lissa Marquez, OTR/L [phone]

## 2024-11-15 NOTE — PROGRESS NOTES
Assessment complete. Pt A&Ox4 and ambulating independently in room. Wife (Yeni) bedside. C/o of pain. VSSA. Prn morphine given. Medication effective. All needs addressed. Bed locked, in lowest position. Bedside table and call light within reach. Plan of care ongoing.

## 2024-11-15 NOTE — PLAN OF CARE
Problem: Skin/Tissue Integrity  Goal: Absence of new skin breakdown  Description: 1.  Monitor for areas of redness and/or skin breakdown  2.  Assess vascular access sites hourly  3.  Every 4-6 hours minimum:  Change oxygen saturation probe site  4.  Every 4-6 hours:  If on nasal continuous positive airway pressure, respiratory therapy assess nares and determine need for appliance change or resting period.  11/15/2024 1140 by Praveen Jeffries RN  Outcome: Progressing  11/14/2024 2330 by Suzanne Mayfield RN  Outcome: Progressing     Problem: Discharge Planning  Goal: Discharge to home or other facility with appropriate resources  11/15/2024 1140 by Praveen Jeffries RN  Outcome: Progressing  11/14/2024 2330 by Suzanne Mayfield RN  Outcome: Progressing     Problem: Pain  Goal: Verbalizes/displays adequate comfort level or baseline comfort level  11/15/2024 1140 by Praveen Jeffries RN  Outcome: Progressing  11/14/2024 2330 by Suzanne Mayfield RN  Outcome: Progressing     Problem: Gastrointestinal - Adult  Goal: Minimal or absence of nausea and vomiting  Outcome: Progressing  Goal: Maintains or returns to baseline bowel function  Outcome: Progressing  Goal: Maintains adequate nutritional intake  Outcome: Progressing     Problem: Genitourinary - Adult  Goal: Absence of urinary retention  Outcome: Progressing

## 2024-11-15 NOTE — PROGRESS NOTES
[facility]        Progress Note    Patient Name: Lindsay Fay  MRN: [id number]  YOB: 1975  Date of Evaluation: 11/15/2024    Chief Complaint: Right flank pain, abdominal pain    Subjective:  No acute events overnight  Pain improved, controlled  No nausea or vomiting, tolerating regular diet  Passing flatus and stool  Resting in bed at this time      Vital Signs:  Patient Vitals for the past 24 hrs:   BP Temp Temp src Pulse Resp SpO2 Height Weight   11/15/24 1020 -- -- -- -- 16 -- -- --   11/15/24 0950 -- -- -- -- 16 -- -- --   11/15/24 0918 (!) 151/93 98 °F (36.7 °C) Oral 79 16 95 % -- --   11/15/24 0745 -- -- -- -- -- -- 1.829 m (6') 104.8 kg (231 lb)   11/15/24 0608 -- -- -- -- 16 -- -- --   11/15/24 0538 -- -- -- -- 16 -- -- --   24 2135 -- -- -- -- 16 -- -- --   24 2100 130/77 97.7 °F (36.5 °C) Oral 75 16 94 % -- --   24 1557 -- -- -- -- 16 -- -- --      TEMPERATURE HISTORY 24H: Temp (24hrs), Av.9 °F (36.6 °C), Min:97.7 °F (36.5 °C), Max:98 °F (36.7 °C)    BLOOD PRESSURE HISTORY: Systolic (36hrs), Av , Min:122 , Max:151    Diastolic (36hrs), Av, Min:77, Max:93      Intake/Output:  No intake/output data recorded.  I/O this shift:  In: 250 [P.O.:240; I.V.:10]  Out: -   Drain/tube Output:       Physical Exam:  General: awake, alert, oriented to  person, place, time  Cardiovascular:  regular rate and rhythm and no murmur noted  Lungs: clear to auscultation  Abdomen: soft, non-distended, non-tender, bowel sounds present    Labs:  CBC:    Recent Labs     24  0651 24  0519 11/15/24  0549   WBC 5.8 6.6 8.9   HGB 14.8 14.3 14.3   HCT 44.8 43.2 42.0    344 350     BMP:    Recent Labs     24  0651 24  0519 11/15/24  0549    137 138   K 3.9 4.4 4.3    106 108   CO2 17* 19* 20*   BUN 20 19 13   CREATININE 1.4* 1.3 1.1   GLUCOSE 103* 116* 111*     Hepatic:    Recent Labs     24  0651   AST

## 2024-11-18 ENCOUNTER — CARE COORDINATION (OUTPATIENT)
Dept: CASE MANAGEMENT | Age: 49
End: 2024-11-18

## 2024-11-18 NOTE — CARE COORDINATION
Care Transitions Note    Initial Call - Call within 2 business days of discharge: Yes    Attempted to reach patient for transitions of care follow up. Unable to reach patient.    Outreach Attempts:   HIPAA compliant voicemail left for patient.     Patient: Lindsay Fay    Patient : 1975   MRN: [id number]    Reason for Admission: abdominal pain and back pain -muscular   Discharge Date: 11/15/24  RURS: No data recorded  Last Discharge Facility       Date Complaint Diagnosis Description Type Department Provider    24 Abdominal Pain Acute abdominal pain in right lower quadrant ... [facility]Dis[facility]; Sidney Wright...            Was this an external facility discharge? No    Follow Up Appointment:   Patient does not have a follow up appointment scheduled at time of call.  UTR  Future Appointments         Provider Specialty Dept Phone    2024 1:15 PM Venancio Hennessy MD Neurology [phone]    2025 8:40 AM Elin Calvert MD Internal Medicine [phone]            Plan for follow-up on next business day.      JAMEEL STEVENS, RN

## 2024-11-19 ENCOUNTER — CARE COORDINATION (OUTPATIENT)
Dept: CASE MANAGEMENT | Age: 49
End: 2024-11-19

## 2024-11-19 DIAGNOSIS — R10.31 ACUTE ABDOMINAL PAIN IN RIGHT LOWER QUADRANT: Primary | ICD-10-CM

## 2024-11-19 NOTE — CARE COORDINATION
Care Transitions Note    Initial Call - Call within 2 business days of discharge: Yes    Patient Current Location:  Home: [address] [place] [id number]    Care Transition Nurse contacted the patient by telephone to perform post hospital discharge assessment, verified name and  as identifiers. Provided introduction to self, and explanation of the Care Transition Nurse role.     Patient: Lindsay Fay    Patient : 1975   MRN: [id number]    Reason for Admission: abdominal pain and back pain -muscular   Discharge Date: 11/15/24  RURS: No data recorded    Last Discharge Facility       Date Complaint Diagnosis Description Type Department Provider    24 Abdominal Pain Acute abdominal pain in right lower quadrant ... ED to Hosp-Admission (Discharged) (ADMITTED) [facility] 3A Rodney Rosales MD; Sidney Wright...            Was this an external facility discharge? No    Additional needs identified to be addressed with provider   No needs identified             Method of communication with provider: none.    Patients top risk factors for readmission: medical condition-.    Interventions to address risk factors:   Education: .  Review of patient management of conditions/medications: .    Care Summary Note: Patient reports that he is doing well, he has returned to work and denies any questions at this time.  Discussed discharge instructions and reviewed medications, 1111F completed. He has all of his medications and is taking them as prescribed.  He has completed the prednisone and has the flexeril taking it PRN.  CTN was unable to find availability on Dr. Calvert's schedule for a hospital follow up.  CTN will route a message to the PCP office requesting outreach to schedule.  Patient denies any questions or concerns at this time.  CTN will continue with outreach follow up calls.      Plan of care updates since last contact:  Education: .  Review of patient management of conditions/medications: .

## 2024-11-21 ENCOUNTER — CARE COORDINATION (OUTPATIENT)
Dept: CASE MANAGEMENT | Age: 49
End: 2024-11-21

## 2024-11-25 ENCOUNTER — OFFICE VISIT (OUTPATIENT)
Dept: INTERNAL MEDICINE CLINIC | Age: 49
End: 2024-11-25
Payer: COMMERCIAL

## 2024-11-25 VITALS
DIASTOLIC BLOOD PRESSURE: 84 MMHG | OXYGEN SATURATION: 98 % | HEIGHT: 72 IN | WEIGHT: 229 LBS | BODY MASS INDEX: 31.02 KG/M2 | HEART RATE: 81 BPM | SYSTOLIC BLOOD PRESSURE: 130 MMHG

## 2024-11-25 DIAGNOSIS — N28.9 RENAL INSUFFICIENCY: ICD-10-CM

## 2024-11-25 DIAGNOSIS — Z09 HOSPITAL DISCHARGE FOLLOW-UP: ICD-10-CM

## 2024-11-25 DIAGNOSIS — N20.0 NEPHROLITHIASIS: Primary | ICD-10-CM

## 2024-11-25 DIAGNOSIS — J45.20 MILD INTERMITTENT ASTHMA WITHOUT COMPLICATION: ICD-10-CM

## 2024-11-25 PROCEDURE — 3079F DIAST BP 80-89 MM HG: CPT

## 2024-11-25 PROCEDURE — 1111F DSCHRG MED/CURRENT MED MERGE: CPT

## 2024-11-25 PROCEDURE — 99213 OFFICE O/P EST LOW 20 MIN: CPT

## 2024-11-25 PROCEDURE — 3075F SYST BP GE 130 - 139MM HG: CPT

## 2024-11-25 PROCEDURE — 1036F TOBACCO NON-USER: CPT

## 2024-11-25 PROCEDURE — 3017F COLORECTAL CA SCREEN DOC REV: CPT

## 2024-11-25 PROCEDURE — G8484 FLU IMMUNIZE NO ADMIN: HCPCS

## 2024-11-25 PROCEDURE — G8417 CALC BMI ABV UP PARAM F/U: HCPCS

## 2024-11-25 PROCEDURE — G8427 DOCREV CUR MEDS BY ELIG CLIN: HCPCS

## 2024-11-25 RX ORDER — OXYCODONE HYDROCHLORIDE 5 MG/1
5 TABLET ORAL EVERY 6 HOURS PRN
Qty: 28 TABLET | Refills: 0 | Status: SHIPPED | OUTPATIENT
Start: 2024-11-25 | End: 2024-12-02

## 2024-11-25 ASSESSMENT — ENCOUNTER SYMPTOMS
CONSTIPATION: 0
BACK PAIN: 1
NAUSEA: 0
CHEST TIGHTNESS: 0
ABDOMINAL PAIN: 0
VOMITING: 0
TROUBLE SWALLOWING: 0
WHEEZING: 0
SORE THROAT: 0
DIARRHEA: 0
SHORTNESS OF BREATH: 0
COUGH: 0

## 2024-11-25 NOTE — ASSESSMENT & PLAN NOTE
Orders:    oxyCODONE (ROXICODONE) 5 MG immediate release tablet; Take 1 tablet by mouth every 6 hours as needed for Pain for up to 7 days. Intended supply: 3 days. Take lowest dose possible to manage pain Max Daily Amount: 20 mg

## 2024-11-25 NOTE — PROGRESS NOTES
Normocephalic and atraumatic.   Cardiovascular:      Rate and Rhythm: Normal rate and regular rhythm.      Pulses: Normal pulses.      Heart sounds: Normal heart sounds. No murmur heard.     No friction rub. No gallop.   Pulmonary:      Effort: Pulmonary effort is normal. No respiratory distress.      Breath sounds: Normal breath sounds. No stridor. No wheezing, rhonchi or rales.   Abdominal:      General: Abdomen is flat. Bowel sounds are normal. There is no distension.      Palpations: Abdomen is soft.      Tenderness: There is no abdominal tenderness. There is right CVA tenderness and left CVA tenderness. There is no guarding.   Musculoskeletal:         General: No swelling or tenderness. Normal range of motion.      Cervical back: No rigidity or tenderness.      Right lower leg: No edema.      Left lower leg: No edema.   Skin:     General: Skin is warm and dry.      Capillary Refill: Capillary refill takes less than 2 seconds.      Coloration: Skin is not jaundiced.      Findings: No rash.   Neurological:      General: No focal deficit present.      Mental Status: He is alert and oriented to person, place, and time. Mental status is at baseline.      Sensory: No sensory deficit.   Psychiatric:         Mood and Affect: Mood normal.         Behavior: Behavior normal.            This dictation was generated by voice recognition computer software.  Although all attempts are made to edit the dictation for accuracy, there may be errors in the transcription that are not intended.    An electronic signature was used to authenticate this note.    --Damian Nam, DO

## 2024-11-26 ENCOUNTER — TELEPHONE (OUTPATIENT)
Dept: INTERNAL MEDICINE CLINIC | Age: 49
End: 2024-11-26

## 2024-11-26 ENCOUNTER — CARE COORDINATION (OUTPATIENT)
Dept: CASE MANAGEMENT | Age: 49
End: 2024-11-26

## 2024-11-26 NOTE — CARE COORDINATION
Care Transitions Note    Follow Up Call     Patient Current Location:  Home: [address] [id number]    [facility] Care Coordinator contacted the patient by telephone. Verified name and  as identifiers.    Additional needs identified to be addressed with provider   Standard priority: Patient requesting refill of tamsulosin. Additionally would like to have Flexeril. States the oxycodone just makes him drowsy. Felt the Flexeril helped more with less side effects.                  Method of communication with provider: chart routing.    Care Summary Note: LPN CC spoke with patient. States he's having some lower back pain. PCP prescribed oxycodone yesterday. Patient states the oxycodone doesn't help pain as much, and makes him pretty drowsy. Prefers the cyclobenzaprine. Saw urology yesterday. Scheduled 12/3 for stone removal. Denies hematuria or dysuria. Appetite ok. Denies problems with BM. Neuro f/u . Patient does not have tamsulosin at home. Last RX was 8/2023 x5 refills of 30 tablets.     Thank you,   Nneka Ham, [facility] Care Coordinator   [facility]  Remote Patient Monitoring, Care Transitions, Ambulatory Care Management  [phone]    Plan of care updates since last contact:  Review of patient management of conditions/medications:         Advance Care Planning:   Does patient have an Advance Directive: deferred at this time, will discuss on future follow up. .    Medication Review:  Medications changed since last call, reviewed today.     Remote Patient Monitoring:  Offered patient enrollment in the Remote Patient Monitoring (RPM) program for in-home monitoring: Patient is not eligible for RPM program because: patient will be unable to respond to alerts d/t work schedule .    Assessments:  Care Transitions Subsequent and Final Call    Subsequent and Final Calls  Do you have any ongoing symptoms?: No  Have your medications changed?: Yes  Do you have any questions related to your

## 2024-11-26 NOTE — TELEPHONE ENCOUNTER
Pt saw  yesterday. Letter in chart for pt to return to work today. Pt states it was discussed that if pt needed an additional day off, he may get that extended. He would like that extended d/t still having back pain - may return to work tomorrow.  He is aware that  is off today. Please advise pt on status of letter, thanks.

## 2024-11-29 ENCOUNTER — CARE COORDINATION (OUTPATIENT)
Dept: CASE MANAGEMENT | Age: 49
End: 2024-11-29

## 2024-11-29 NOTE — CARE COORDINATION
Care Transitions Note    Follow Up Call     Patient Current Location:  Home: [address] [place] [id number]    Care Transition Nurse contacted the patient by telephone. Verified name and  as identifiers.    Additional needs identified to be addressed with provider   No needs identified                 Method of communication with provider: none.    Care Summary Note: Patient reports that he is doing \"OK\".  He is taking oxycodone, it makes him drowsy.  He has a call in to the office requesting flexeril refill, he reports that this helped him when he was inpatient. LPN also routed a message to Dr. Calvert 24 concerning this matter.  Patient denies any difficulty urinating or hematuria and he will have stone removal next week.  CTN will continue with outreach follow up calls.    Plan of care updates since last contact:  Education: .  Review of patient management of conditions/medications: .       Advance Care Planning:   Does patient have an Advance Directive: reviewed during previous call, see note. .    Medication Review:  No changes since last call.     Remote Patient Monitoring:  Offered patient enrollment in the Remote Patient Monitoring (RPM) program for in-home monitoring: .declined, work schedule  Assessments:  Care Transitions Subsequent and Final Call    Subsequent and Final Calls  Do you have any ongoing symptoms?: No  Have your medications changed?: No  Do you have any questions related to your medications?: No  Do you currently have any active services?: No  Do you have any needs or concerns that I can assist you with?: No  Identified Barriers: None  Care Transitions Interventions     DME Assistance: Completed   Other Interventions:              Follow Up Appointment:     Future Appointments         Provider Specialty Dept Phone    2025 8:40 AM Elin Calvert MD Internal Medicine [phone]    3/3/2025 11:15 AM Venancio Hennessy MD Neurology [phone]            Care

## 2024-12-04 ENCOUNTER — HOSPITAL ENCOUNTER (OUTPATIENT)
Age: 49
Setting detail: OBSERVATION
Discharge: HOME | End: 2024-12-06
Attending: INTERNAL MEDICINE | Admitting: INTERNAL MEDICINE
Payer: COMMERCIAL

## 2024-12-04 ENCOUNTER — APPOINTMENT (OUTPATIENT)
Dept: CT IMAGING | Age: 49
End: 2024-12-04
Payer: COMMERCIAL

## 2024-12-04 DIAGNOSIS — R10.9 ACUTE LEFT FLANK PAIN: Primary | ICD-10-CM

## 2024-12-04 DIAGNOSIS — I10 HTN (HYPERTENSION), BENIGN: ICD-10-CM

## 2024-12-04 DIAGNOSIS — N23 RENAL COLIC ON LEFT SIDE: ICD-10-CM

## 2024-12-04 DIAGNOSIS — Z98.890 HISTORY OF LITHOTRIPSY: ICD-10-CM

## 2024-12-04 DIAGNOSIS — N28.9 ACUTE RENAL INSUFFICIENCY: ICD-10-CM

## 2024-12-04 DIAGNOSIS — N20.0 STONE, KIDNEY: ICD-10-CM

## 2024-12-04 DIAGNOSIS — R52 INTRACTABLE PAIN: ICD-10-CM

## 2024-12-04 LAB
ANION GAP SERPL CALCULATED.3IONS-SCNC: 11 MMOL/L (ref 3–16)
BACTERIA URNS QL MICRO: ABNORMAL /HPF
BASOPHILS # BLD: 0 K/UL (ref 0–0.2)
BASOPHILS NFR BLD: 0.7 %
BILIRUB UR QL STRIP.AUTO: NEGATIVE
BUN SERPL-MCNC: 12 MG/DL (ref 7–20)
CALCIUM SERPL-MCNC: 8.8 MG/DL (ref 8.3–10.6)
CHLORIDE SERPL-SCNC: 102 MMOL/L (ref 99–110)
CLARITY UR: CLEAR
CO2 SERPL-SCNC: 23 MMOL/L (ref 21–32)
COLOR UR: YELLOW
CREAT SERPL-MCNC: 1.4 MG/DL (ref 0.9–1.3)
DEPRECATED RDW RBC AUTO: 15.8 % (ref 12.4–15.4)
EOSINOPHIL # BLD: 0.1 K/UL (ref 0–0.6)
EOSINOPHIL NFR BLD: 1.8 %
EPI CELLS #/AREA URNS AUTO: 0 /HPF (ref 0–5)
GFR SERPLBLD CREATININE-BSD FMLA CKD-EPI: 61 ML/MIN/{1.73_M2}
GLUCOSE SERPL-MCNC: 153 MG/DL (ref 70–99)
GLUCOSE UR STRIP.AUTO-MCNC: 100 MG/DL
HCT VFR BLD AUTO: 42.4 % (ref 40.5–52.5)
HGB BLD-MCNC: 14 G/DL (ref 13.5–17.5)
HGB UR QL STRIP.AUTO: ABNORMAL
HYALINE CASTS #/AREA URNS AUTO: 0 /LPF (ref 0–8)
KETONES UR STRIP.AUTO-MCNC: NEGATIVE MG/DL
LEUKOCYTE ESTERASE UR QL STRIP.AUTO: NEGATIVE
LYMPHOCYTES # BLD: 2.2 K/UL (ref 1–5.1)
LYMPHOCYTES NFR BLD: 39.5 %
MCH RBC QN AUTO: 28.4 PG (ref 26–34)
MCHC RBC AUTO-ENTMCNC: 33.1 G/DL (ref 31–36)
MCV RBC AUTO: 85.8 FL (ref 80–100)
MONOCYTES # BLD: 0.6 K/UL (ref 0–1.3)
MONOCYTES NFR BLD: 10.9 %
NEUTROPHILS # BLD: 2.7 K/UL (ref 1.7–7.7)
NEUTROPHILS NFR BLD: 47.1 %
NITRITE UR QL STRIP.AUTO: NEGATIVE
PH UR STRIP.AUTO: 7 [PH] (ref 5–8)
PLATELET # BLD AUTO: 300 K/UL (ref 135–450)
PMV BLD AUTO: 8 FL (ref 5–10.5)
POTASSIUM SERPL-SCNC: 3.4 MMOL/L (ref 3.5–5.1)
PROT UR STRIP.AUTO-MCNC: NEGATIVE MG/DL
RBC # BLD AUTO: 4.95 M/UL (ref 4.2–5.9)
RBC CLUMPS #/AREA URNS AUTO: 31 /HPF (ref 0–4)
SODIUM SERPL-SCNC: 136 MMOL/L (ref 136–145)
SP GR UR STRIP.AUTO: 1.01 (ref 1–1.03)
UA COMPLETE W REFLEX CULTURE PNL UR: ABNORMAL
UA DIPSTICK W REFLEX MICRO PNL UR: YES
URN SPEC COLLECT METH UR: ABNORMAL
UROBILINOGEN UR STRIP-ACNC: 0.2 E.U./DL
WBC # BLD AUTO: 5.7 K/UL (ref 4–11)
WBC #/AREA URNS AUTO: 1 /HPF (ref 0–5)

## 2024-12-04 PROCEDURE — 96361 HYDRATE IV INFUSION ADD-ON: CPT

## 2024-12-04 PROCEDURE — 6370000000 HC RX 637 (ALT 250 FOR IP)

## 2024-12-04 PROCEDURE — 6360000002 HC RX W HCPCS: Performed by: INTERNAL MEDICINE

## 2024-12-04 PROCEDURE — 6370000000 HC RX 637 (ALT 250 FOR IP): Performed by: INTERNAL MEDICINE

## 2024-12-04 PROCEDURE — 96365 THER/PROPH/DIAG IV INF INIT: CPT

## 2024-12-04 PROCEDURE — 80048 BASIC METABOLIC PNL TOTAL CA: CPT

## 2024-12-04 PROCEDURE — 2580000003 HC RX 258: Performed by: INTERNAL MEDICINE

## 2024-12-04 PROCEDURE — 74176 CT ABD & PELVIS W/O CONTRAST: CPT

## 2024-12-04 PROCEDURE — 85025 COMPLETE CBC W/AUTO DIFF WBC: CPT

## 2024-12-04 PROCEDURE — 96375 TX/PRO/DX INJ NEW DRUG ADDON: CPT

## 2024-12-04 PROCEDURE — 99285 EMERGENCY DEPT VISIT HI MDM: CPT

## 2024-12-04 PROCEDURE — 81001 URINALYSIS AUTO W/SCOPE: CPT

## 2024-12-04 PROCEDURE — G0378 HOSPITAL OBSERVATION PER HR: HCPCS

## 2024-12-04 PROCEDURE — 96374 THER/PROPH/DIAG INJ IV PUSH: CPT

## 2024-12-04 PROCEDURE — 96376 TX/PRO/DX INJ SAME DRUG ADON: CPT

## 2024-12-04 PROCEDURE — 82365 CALCULUS SPECTROSCOPY: CPT

## 2024-12-04 RX ORDER — KETOROLAC TROMETHAMINE 15 MG/ML
15 INJECTION, SOLUTION INTRAMUSCULAR; INTRAVENOUS ONCE
Status: COMPLETED | OUTPATIENT
Start: 2024-12-04 | End: 2024-12-04

## 2024-12-04 RX ORDER — SODIUM CHLORIDE 9 MG/ML
INJECTION, SOLUTION INTRAVENOUS PRN
Status: DISCONTINUED | OUTPATIENT
Start: 2024-12-04 | End: 2024-12-06 | Stop reason: HOSPADM

## 2024-12-04 RX ORDER — ENOXAPARIN SODIUM 100 MG/ML
40 INJECTION SUBCUTANEOUS DAILY
Status: DISCONTINUED | OUTPATIENT
Start: 2024-12-04 | End: 2024-12-06 | Stop reason: HOSPADM

## 2024-12-04 RX ORDER — SODIUM CHLORIDE 0.9 % (FLUSH) 0.9 %
5-40 SYRINGE (ML) INJECTION PRN
Status: DISCONTINUED | OUTPATIENT
Start: 2024-12-04 | End: 2024-12-06 | Stop reason: HOSPADM

## 2024-12-04 RX ORDER — FENTANYL CITRATE 50 UG/ML
75 INJECTION, SOLUTION INTRAMUSCULAR; INTRAVENOUS ONCE
Status: COMPLETED | OUTPATIENT
Start: 2024-12-04 | End: 2024-12-04

## 2024-12-04 RX ORDER — SODIUM CHLORIDE 9 MG/ML
INJECTION, SOLUTION INTRAVENOUS
Status: DISPENSED
Start: 2024-12-04 | End: 2024-12-04

## 2024-12-04 RX ORDER — SODIUM CHLORIDE 9 MG/ML
INJECTION, SOLUTION INTRAVENOUS CONTINUOUS
Status: DISCONTINUED | OUTPATIENT
Start: 2024-12-04 | End: 2024-12-04

## 2024-12-04 RX ORDER — ACETAMINOPHEN 650 MG/1
650 SUPPOSITORY RECTAL EVERY 6 HOURS PRN
Status: DISCONTINUED | OUTPATIENT
Start: 2024-12-04 | End: 2024-12-06 | Stop reason: HOSPADM

## 2024-12-04 RX ORDER — TAMSULOSIN HYDROCHLORIDE 0.4 MG/1
0.4 CAPSULE ORAL DAILY
Status: DISCONTINUED | OUTPATIENT
Start: 2024-12-04 | End: 2024-12-06 | Stop reason: HOSPADM

## 2024-12-04 RX ORDER — MAGNESIUM SULFATE IN WATER 40 MG/ML
2000 INJECTION, SOLUTION INTRAVENOUS PRN
Status: DISCONTINUED | OUTPATIENT
Start: 2024-12-04 | End: 2024-12-06 | Stop reason: HOSPADM

## 2024-12-04 RX ORDER — FOLIC ACID 1 MG/1
1 TABLET ORAL DAILY
Status: DISCONTINUED | OUTPATIENT
Start: 2024-12-04 | End: 2024-12-06 | Stop reason: HOSPADM

## 2024-12-04 RX ORDER — 0.9 % SODIUM CHLORIDE 0.9 %
1000 INTRAVENOUS SOLUTION INTRAVENOUS ONCE
Status: COMPLETED | OUTPATIENT
Start: 2024-12-04 | End: 2024-12-04

## 2024-12-04 RX ORDER — POTASSIUM CHLORIDE 7.45 MG/ML
10 INJECTION INTRAVENOUS ONCE
Status: COMPLETED | OUTPATIENT
Start: 2024-12-04 | End: 2024-12-04

## 2024-12-04 RX ORDER — PROCHLORPERAZINE EDISYLATE 5 MG/ML
10 INJECTION INTRAMUSCULAR; INTRAVENOUS ONCE
Status: COMPLETED | OUTPATIENT
Start: 2024-12-04 | End: 2024-12-04

## 2024-12-04 RX ORDER — ONDANSETRON 2 MG/ML
4 INJECTION INTRAMUSCULAR; INTRAVENOUS ONCE
Status: COMPLETED | OUTPATIENT
Start: 2024-12-04 | End: 2024-12-04

## 2024-12-04 RX ORDER — POLYETHYLENE GLYCOL 3350 17 G/17G
17 POWDER, FOR SOLUTION ORAL DAILY PRN
Status: DISCONTINUED | OUTPATIENT
Start: 2024-12-04 | End: 2024-12-06 | Stop reason: HOSPADM

## 2024-12-04 RX ORDER — SODIUM CHLORIDE 0.9 % (FLUSH) 0.9 %
5-40 SYRINGE (ML) INJECTION EVERY 12 HOURS SCHEDULED
Status: DISCONTINUED | OUTPATIENT
Start: 2024-12-04 | End: 2024-12-06 | Stop reason: HOSPADM

## 2024-12-04 RX ORDER — ACETAMINOPHEN 325 MG/1
650 TABLET ORAL EVERY 6 HOURS PRN
Status: DISCONTINUED | OUTPATIENT
Start: 2024-12-04 | End: 2024-12-06 | Stop reason: HOSPADM

## 2024-12-04 RX ORDER — ATORVASTATIN CALCIUM 10 MG/1
10 TABLET, FILM COATED ORAL NIGHTLY
Status: DISCONTINUED | OUTPATIENT
Start: 2024-12-04 | End: 2024-12-06 | Stop reason: HOSPADM

## 2024-12-04 RX ORDER — TAMSULOSIN HYDROCHLORIDE 0.4 MG/1
0.4 CAPSULE ORAL DAILY
Status: DISCONTINUED | OUTPATIENT
Start: 2024-12-04 | End: 2024-12-04 | Stop reason: SDUPTHER

## 2024-12-04 RX ORDER — POTASSIUM CHLORIDE 7.45 MG/ML
10 INJECTION INTRAVENOUS PRN
Status: DISCONTINUED | OUTPATIENT
Start: 2024-12-04 | End: 2024-12-06 | Stop reason: HOSPADM

## 2024-12-04 RX ORDER — POTASSIUM CHLORIDE 1500 MG/1
40 TABLET, EXTENDED RELEASE ORAL PRN
Status: DISCONTINUED | OUTPATIENT
Start: 2024-12-04 | End: 2024-12-06 | Stop reason: HOSPADM

## 2024-12-04 RX ORDER — HYDROMORPHONE HYDROCHLORIDE 1 MG/ML
1 INJECTION, SOLUTION INTRAMUSCULAR; INTRAVENOUS; SUBCUTANEOUS
Status: DISCONTINUED | OUTPATIENT
Start: 2024-12-04 | End: 2024-12-06 | Stop reason: HOSPADM

## 2024-12-04 RX ORDER — ONDANSETRON 2 MG/ML
4 INJECTION INTRAMUSCULAR; INTRAVENOUS EVERY 6 HOURS PRN
Status: DISCONTINUED | OUTPATIENT
Start: 2024-12-04 | End: 2024-12-06 | Stop reason: HOSPADM

## 2024-12-04 RX ORDER — ONDANSETRON 4 MG/1
4 TABLET, ORALLY DISINTEGRATING ORAL EVERY 8 HOURS PRN
Status: DISCONTINUED | OUTPATIENT
Start: 2024-12-04 | End: 2024-12-06 | Stop reason: HOSPADM

## 2024-12-04 RX ORDER — AMLODIPINE BESYLATE 5 MG/1
10 TABLET ORAL DAILY
Status: DISCONTINUED | OUTPATIENT
Start: 2024-12-05 | End: 2024-12-06 | Stop reason: HOSPADM

## 2024-12-04 RX ADMIN — TAMSULOSIN HYDROCHLORIDE 0.4 MG: 0.4 CAPSULE ORAL at 13:10

## 2024-12-04 RX ADMIN — HYDROMORPHONE HYDROCHLORIDE 1 MG: 1 INJECTION, SOLUTION INTRAMUSCULAR; INTRAVENOUS; SUBCUTANEOUS at 11:32

## 2024-12-04 RX ADMIN — FENTANYL CITRATE 75 MCG: 50 INJECTION INTRAMUSCULAR; INTRAVENOUS at 07:59

## 2024-12-04 RX ADMIN — PROCHLORPERAZINE EDISYLATE 10 MG: 5 INJECTION INTRAMUSCULAR; INTRAVENOUS at 08:42

## 2024-12-04 RX ADMIN — ONDANSETRON 4 MG: 2 INJECTION, SOLUTION INTRAMUSCULAR; INTRAVENOUS at 07:58

## 2024-12-04 RX ADMIN — HYDROMORPHONE HYDROCHLORIDE 1 MG: 1 INJECTION, SOLUTION INTRAMUSCULAR; INTRAVENOUS; SUBCUTANEOUS at 20:45

## 2024-12-04 RX ADMIN — SODIUM CHLORIDE, PRESERVATIVE FREE 10 ML: 5 INJECTION INTRAVENOUS at 11:35

## 2024-12-04 RX ADMIN — KETOROLAC TROMETHAMINE 15 MG: 15 INJECTION, SOLUTION INTRAMUSCULAR; INTRAVENOUS at 08:51

## 2024-12-04 RX ADMIN — POLYETHYLENE GLYCOL 3350 17 G: 17 POWDER, FOR SOLUTION ORAL at 14:15

## 2024-12-04 RX ADMIN — POTASSIUM CHLORIDE 10 MEQ: 7.46 INJECTION, SOLUTION INTRAVENOUS at 09:48

## 2024-12-04 RX ADMIN — HYDROMORPHONE HYDROCHLORIDE 1 MG: 1 INJECTION, SOLUTION INTRAMUSCULAR; INTRAVENOUS; SUBCUTANEOUS at 14:48

## 2024-12-04 RX ADMIN — HYDROMORPHONE HYDROCHLORIDE 1 MG: 1 INJECTION, SOLUTION INTRAMUSCULAR; INTRAVENOUS; SUBCUTANEOUS at 17:36

## 2024-12-04 RX ADMIN — SODIUM CHLORIDE: 9 INJECTION, SOLUTION INTRAVENOUS at 11:30

## 2024-12-04 RX ADMIN — FOLIC ACID 1 MG: 1 TABLET ORAL at 14:15

## 2024-12-04 RX ADMIN — SODIUM CHLORIDE 1000 ML: 9 INJECTION, SOLUTION INTRAVENOUS at 08:03

## 2024-12-04 ASSESSMENT — PAIN SCALES - GENERAL
PAINLEVEL_OUTOF10: 5
PAINLEVEL_OUTOF10: 4
PAINLEVEL_OUTOF10: 7
PAINLEVEL_OUTOF10: 8
PAINLEVEL_OUTOF10: 10
PAINLEVEL_OUTOF10: 9
PAINLEVEL_OUTOF10: 9
PAINLEVEL_OUTOF10: 10
PAINLEVEL_OUTOF10: 9
PAINLEVEL_OUTOF10: 10

## 2024-12-04 ASSESSMENT — PAIN DESCRIPTION - FREQUENCY: FREQUENCY: CONTINUOUS

## 2024-12-04 ASSESSMENT — PAIN DESCRIPTION - ORIENTATION
ORIENTATION: LEFT
ORIENTATION: MID
ORIENTATION: MID

## 2024-12-04 ASSESSMENT — PAIN DESCRIPTION - LOCATION
LOCATION: ABDOMEN
LOCATION: ABDOMEN
LOCATION: BACK
LOCATION: FLANK
LOCATION: ABDOMEN
LOCATION: ABDOMEN

## 2024-12-04 ASSESSMENT — PAIN - FUNCTIONAL ASSESSMENT
PAIN_FUNCTIONAL_ASSESSMENT: PREVENTS OR INTERFERES SOME ACTIVE ACTIVITIES AND ADLS
PAIN_FUNCTIONAL_ASSESSMENT: 0-10

## 2024-12-04 ASSESSMENT — PAIN SCALES - WONG BAKER
WONGBAKER_NUMERICALRESPONSE: NO HURT

## 2024-12-04 ASSESSMENT — PAIN DESCRIPTION - PAIN TYPE
TYPE: ACUTE PAIN
TYPE: ACUTE PAIN

## 2024-12-04 ASSESSMENT — PAIN DESCRIPTION - DESCRIPTORS
DESCRIPTORS: ACHING
DESCRIPTORS: THROBBING
DESCRIPTORS: STABBING

## 2024-12-04 ASSESSMENT — PAIN DESCRIPTION - ONSET: ONSET: ON-GOING

## 2024-12-04 NOTE — PROGRESS NOTES
.4 Eyes Skin Assessment     NAME:  Lindsay Fay  YOB: 1975  MEDICAL RECORD NUMBER:  [id number]    The patient is being assessed for  Admission    I agree that at least one RN has performed a thorough Head to Toe Skin Assessment on the patient. ALL assessment sites listed below have been assessed.      Areas assessed by both nurses:    Head, Face, Ears, Shoulders, Back, Chest, Arms, Elbows, Hands, Sacrum. Buttock, Coccyx, Ischium, Legs. Feet and Heels, and Under Medical Devices         Does the Patient have a Wound? No noted wound(s)       Robin Prevention initiated by RN: No  Wound Care Orders initiated by RN: No    Pressure Injury (Stage 3,4, Unstageable, DTI, NWPT, and Complex wounds) if present, place Wound referral order by RN under : No    New Ostomies, if present place, Ostomy referral order under : No     Nurse 1 eSignature: Electronically signed by Rebecca Eisenberg RN on 12/4/24 at 2:14 PM EST    **SHARE this note so that the co-signing nurse can place an eSignature**    Nurse 2 eSignature: Electronically signed by Elissa Gutierrez RN on 12/4/24 at 3:22 PM EST

## 2024-12-04 NOTE — PROGRESS NOTES
Patient has arrived to unit in stable condition. Vitals obtained. Patient is awake, alert and oriented. Respirations are easy and unlabored. Patient does not appear to be in distress. Ambulated to bathroom and returned to bed safely, steady gait observed. Patient oriented to room and call light. Plan of care discussed with patient, patient agreeable. Call light within reach. No needs expressed.     1140: PRN dilaudid administered for abd pain, 8/10.

## 2024-12-04 NOTE — CONSULTS
Urology Consult Note  [facility]     Patient: Lindsay Fay MRN: [id number]  Room/Bed: [facility]3311/3311-01   YOB: 1975  Age/Sex: 49 y.o.male  Admission Date: 12/4/2024     Date of Service:  12/4/2024    Consulting Provider: Hayder Savage PA-C CNP  Admitting/Requesting Physician: Yessenia Parish MD  Primary Care Physician: Elin Calvert MD    Reason for Consult: Stone s/p ESWL    ASSESSMENT/PLAN     48 yo male   Underwent L ESWL 12/3/24 for a left renal stone measuring 7-8 mm. NO stent was placed. Did OK but around 5 am this morning was having severe left sided abdominal pain. CT showed small stone fragments in bladder, UVJ and left kidney. There was noted to be mild hydronephrosis. He did pass small stone fragments that we will send for analysis today. His pain is better controlled this afternoon than when he initially presented to the ED.   He is afvss. Cr mildly elevated 1.4. No leukocytosis. No UTI    Recommendations:  Pain improved this afternoon and patient stable. He is passing small stone fragments which we will send off for analysis today. I would recommend to continue pain control, fluids, and flomax. Continue to strain urine. High chance that he will continue to pass remaining stone fragments however I will make him NPO at midnight in the event his pain returns in which we will plan for URS tomorrow. Urology will evaluate first thing in the AM.     All patient questions were answered. He understands the plan as listed above.    HISTORY     Chief Complaint:   Chief Complaint   Patient presents with    Flank Pain     [facility] and had [facility]ripsy performed. Pt reports increased pain to left side that started approx 2 hrs ago. Pt reports passing a stone last night. Pt writhing in pain in triage.        History of Present Illness: Lindsay Fay is a 49 y.o. male with left abdominal  pain. Onset of symptoms was days ago with improved course since that  GLUCOSE 153 12/04/2024 08:00 AM    CALCIUM 8.8 12/04/2024 08:00 AM     Urinalysis:   Lab Results   Component Value Date/Time    COLORU Yellow 12/04/2024 08:11 AM    GLUCOSEU 100 12/04/2024 08:11 AM    BLOODU LARGE 12/04/2024 08:11 AM    NITRU Negative 12/04/2024 08:11 AM    LEUKOCYTESUR Negative 12/04/2024 08:11 AM     Urine culture: No results for input(s): \"LABURIN\" in the last 72 hours.  PSA:   Lab Results   Component Value Date    PSA 1.83 08/22/2023         IMAGING     CT ABDOMEN PELVIS WO CONTRAST Additional Contrast? None    Result Date: 12/4/2024  EXAMINATION: CT OF THE ABDOMEN AND PELVIS WITHOUT CONTRAST 12/4/2024 9:00 am TECHNIQUE: CT of the abdomen and pelvis was performed without the administration of intravenous contrast. Multiplanar reformatted images are provided for review. Automated exposure control, iterative reconstruction, and/or weight based adjustment of the mA/kV was utilized to reduce the radiation dose to as low as reasonably achievable. COMPARISON: 13 and 14 November CT. HISTORY: ORDERING SYSTEM PROVIDED HISTORY: left flank pain, S/P lithotripsy yesterday TECHNOLOGIST PROVIDED HISTORY: Reason for exam:->left flank pain, S/P lithotripsy yesterday Additional Contrast?->None Decision Support Exception - unselect if not a suspected or confirmed emergency medical condition->Emergency Medical Condition (MA) Reason for Exam: left flank pain, S/P lithotripsy yesterday FINDINGS: Lower Chest: Normal bronchovascular markings. No effusion, pneumothorax or airspace process. Organs: Liver: Normal Gallbladder: Normal Pancreas: Normal Adrenal glands: Normal Kidneys: Several tiny stone fragments identified in the lower pole of the left kidney.  There is mild hydro ureter and hydronephrosis.  There is a small stone in the left UVJ junction and a small stone in the bladder.  The right kidney is unremarkable.  No worrisome mass. Spleen: Normal GI/Bowel: Stomach: Small hiatal hernia.  The stomach is otherwise

## 2024-12-04 NOTE — ED PROVIDER NOTES
EMERGENCY MEDICINE PROVIDER NOTE    Patient Identification  Pt Name: Lindsay Fay  MRN: [id number]  Birthdate 1975  Date of evaluation: 12/4/2024  Provider: MAMI NOLAND DO  PCP: Elin Calvert MD    Chief Complaint  Flank Pain ([facility] and had [facility]otripsy performed. Pt reports increased pain to left side that started approx 2 hrs ago. Pt reports passing a stone last night. Pt writhing in pain in triage. )      HPI  (History provided by patient)  This is a 49 y.o. male who was brought in by self for left flank pain that is severe.  The symptoms started 2 hours ago.  Patient had a lithotripsy yesterday at the [facility].  Nothing makes the pain better or worse.  He has been taken Vicodin at home without relief.  He denies any fever or chills.  He is also having some nausea and vomiting.  The pain does wrap around to his left lower quadrant of the abdomen.  Patient is having some hematuria and dysuria.    I have reviewed the following nursing documentation:  Allergies: Lisinopril    Past medical history:   Past Medical History:   Diagnosis Date    Asthma     BPH associated with nocturia 8/9/2021    Hypertension     Mild intermittent asthma without complication 2/8/2022    Mixed hyperlipidemia 8/9/2021    Obesity (BMI 30.0-34.9) 8/9/2021    Prediabetes 10/20/2021     Past surgical history: History reviewed. No pertinent surgical history.    Home medications:   Current Discharge Medication List        CONTINUE these medications which have NOT CHANGED    Details   amLODIPine (NORVASC) 10 MG tablet Take 1 tablet by mouth daily TAKE 1 TABLET BY MOUTH EVERY DAY  Qty: 90 tablet, Refills: 1    Comments: Schedule your follow up appointment.no further refills.  Associated Diagnoses: HTN (hypertension), benign      folic acid (FOLVITE) 1 MG tablet Take 1 tablet by mouth daily  Qty: 90 tablet, Refills: 1      rimegepant sulfate (NURTEC) 75 MG TBDP Take 75 mg by mouth daily as needed (for

## 2024-12-04 NOTE — H&P
V2.0  History and Physical      Name:  Lindsay Fay /Age/Sex: 1975  (49 y.o. male)   MRN & CSN:  [id number] & [id number] Encounter Date/Time: 2024 11:20 AM EST   Location:  [address] [place] [address] PCP: Elin Calvert MD       Hospital Day: 1    Assessment and Plan:   Lindsay Fay is a 49 y.o. male with a pmh of migraine headaches, Asthma, CKD 2-3, HTN, HLD, prediabetes and nephrolithiasis status post lithotripsy on 12/3 outpatient presented from home with complaints of severe left-sided abdominal pain.  CT A/P with Mild left hydro ureter and hydronephrosis with mildly obstructing stone in the left UVJ.     Hospital Problems             Last Modified POA    * (Principal) Renal colic on left side 2024 Yes       Plan:    Left renal colic:  CT:Mild left hydro ureter and hydronephrosis with mildly obstructing stone in the left UVJ.   Urology consulted.  Continue NPO, IVF, pain control, Flomax.    HTN: Monitor BP on home medications.    Constipation: Started on bowel regimen.     CKD 2- 3: Stable  renal function.    Disposition:   Current Living situation:   Home  Expected Disposition:  Home  Estimated D/C:  at least 24 hours    Diet Diet NPO   DVT Prophylaxis [x] Lovenox, []  Heparin, [] SCDs, [] Ambulation,  [] Eliquis, [] Xarelto, [] Coumadin   Code Status Full Code   Surrogate Decision Maker/ SAM SumeetYeni (Spouse)  [phone]     Personally reviewed Lab Studies and Imaging     EKG: None on this admission.    Drugs that require monitoring for toxicity: None        History from:     patient, electronic medical record    History of Present Illness:     Chief Complaint: Abdominal pain    Lindsay Fay is a 49 y.o. male with pmh of migraine headaches, Asthma, CKD 2-3, HTN, HLD, prediabetes and nephrolithiasis status post lithotripsy on 12/3 outpatient presented from home with complaints of severe left-sided abdominal pain x 2 hours.  Pain cramping in nature, no aggravating or relieving factors,  and hydronephrosis.  There is a small stone in the left UVJ junction and a small stone in the bladder.  The right kidney is unremarkable.  No worrisome mass. Spleen: Normal GI/Bowel: Stomach: Small hiatal hernia.  The stomach is otherwise normal Small bowel: Unremarkable. Colon: Mild scattered colonic diverticulum. Pelvis: The bladder is normal.  Prostate gland is normal.  No adenopathy or free fluid. Peritoneum/Retroperitoneum: No retroperitoneal adenopathy or free air. Bones/Soft Tissues: Degenerative changes of the spine most pronounced L5-S1. No lytic or blastic lesion.     Multiple tiny stone fragments, lower pole left kidney.  Mild left hydro ureter and hydronephrosis with mildly obstructing stone in the left UVJ. Stone in the bladder.  Findings would be consistent with recent left though trips E.         Electronically signed by Yessenia Parish MD on 12/4/2024 at 11:28 AM

## 2024-12-05 ENCOUNTER — ANESTHESIA (OUTPATIENT)
Dept: OPERATING ROOM | Age: 49
End: 2024-12-05
Payer: COMMERCIAL

## 2024-12-05 ENCOUNTER — APPOINTMENT (OUTPATIENT)
Dept: GENERAL RADIOLOGY | Age: 49
End: 2024-12-05
Payer: COMMERCIAL

## 2024-12-05 ENCOUNTER — ANESTHESIA EVENT (OUTPATIENT)
Dept: OPERATING ROOM | Age: 49
End: 2024-12-05
Payer: COMMERCIAL

## 2024-12-05 LAB
ANION GAP SERPL CALCULATED.3IONS-SCNC: 13 MMOL/L (ref 3–16)
BUN SERPL-MCNC: 8 MG/DL (ref 7–20)
CALCIUM SERPL-MCNC: 9.5 MG/DL (ref 8.3–10.6)
CHLORIDE SERPL-SCNC: 106 MMOL/L (ref 99–110)
CO2 SERPL-SCNC: 25 MMOL/L (ref 21–32)
CREAT SERPL-MCNC: 1.3 MG/DL (ref 0.9–1.3)
DEPRECATED RDW RBC AUTO: 15.9 % (ref 12.4–15.4)
GFR SERPLBLD CREATININE-BSD FMLA CKD-EPI: 66 ML/MIN/{1.73_M2}
GLUCOSE SERPL-MCNC: 116 MG/DL (ref 70–99)
HCT VFR BLD AUTO: 43.2 % (ref 40.5–52.5)
HGB BLD-MCNC: 14.2 G/DL (ref 13.5–17.5)
MCH RBC QN AUTO: 28.1 PG (ref 26–34)
MCHC RBC AUTO-ENTMCNC: 32.9 G/DL (ref 31–36)
MCV RBC AUTO: 85.5 FL (ref 80–100)
PLATELET # BLD AUTO: 313 K/UL (ref 135–450)
PMV BLD AUTO: 7.9 FL (ref 5–10.5)
POTASSIUM SERPL-SCNC: 3.8 MMOL/L (ref 3.5–5.1)
RBC # BLD AUTO: 5.06 M/UL (ref 4.2–5.9)
SODIUM SERPL-SCNC: 144 MMOL/L (ref 136–145)
WBC # BLD AUTO: 7 K/UL (ref 4–11)

## 2024-12-05 PROCEDURE — 2580000003 HC RX 258: Performed by: UROLOGY

## 2024-12-05 PROCEDURE — 7100000001 HC PACU RECOVERY - ADDTL 15 MIN: Performed by: UROLOGY

## 2024-12-05 PROCEDURE — 6370000000 HC RX 637 (ALT 250 FOR IP): Performed by: UROLOGY

## 2024-12-05 PROCEDURE — 6360000002 HC RX W HCPCS: Performed by: INTERNAL MEDICINE

## 2024-12-05 PROCEDURE — 6360000002 HC RX W HCPCS: Performed by: UROLOGY

## 2024-12-05 PROCEDURE — 85027 COMPLETE CBC AUTOMATED: CPT

## 2024-12-05 PROCEDURE — 3700000001 HC ADD 15 MINUTES (ANESTHESIA): Performed by: UROLOGY

## 2024-12-05 PROCEDURE — 6370000000 HC RX 637 (ALT 250 FOR IP): Performed by: INTERNAL MEDICINE

## 2024-12-05 PROCEDURE — 74018 RADEX ABDOMEN 1 VIEW: CPT

## 2024-12-05 PROCEDURE — G0378 HOSPITAL OBSERVATION PER HR: HCPCS

## 2024-12-05 PROCEDURE — 2720000010 HC SURG SUPPLY STERILE: Performed by: UROLOGY

## 2024-12-05 PROCEDURE — 6360000002 HC RX W HCPCS: Performed by: NURSE ANESTHETIST, CERTIFIED REGISTERED

## 2024-12-05 PROCEDURE — 82365 CALCULUS SPECTROSCOPY: CPT

## 2024-12-05 PROCEDURE — 3600000004 HC SURGERY LEVEL 4 BASE: Performed by: UROLOGY

## 2024-12-05 PROCEDURE — 74420 UROGRAPHY RTRGR +-KUB: CPT

## 2024-12-05 PROCEDURE — 6360000004 HC RX CONTRAST MEDICATION: Performed by: UROLOGY

## 2024-12-05 PROCEDURE — 2709999900 HC NON-CHARGEABLE SUPPLY: Performed by: UROLOGY

## 2024-12-05 PROCEDURE — 6370000000 HC RX 637 (ALT 250 FOR IP): Performed by: NURSE PRACTITIONER

## 2024-12-05 PROCEDURE — 96376 TX/PRO/DX INJ SAME DRUG ADON: CPT

## 2024-12-05 PROCEDURE — C1758 CATHETER, URETERAL: HCPCS | Performed by: UROLOGY

## 2024-12-05 PROCEDURE — 36415 COLL VENOUS BLD VENIPUNCTURE: CPT

## 2024-12-05 PROCEDURE — 2580000003 HC RX 258: Performed by: INTERNAL MEDICINE

## 2024-12-05 PROCEDURE — 7100000000 HC PACU RECOVERY - FIRST 15 MIN: Performed by: UROLOGY

## 2024-12-05 PROCEDURE — 3700000000 HC ANESTHESIA ATTENDED CARE: Performed by: UROLOGY

## 2024-12-05 PROCEDURE — 3600000014 HC SURGERY LEVEL 4 ADDTL 15MIN: Performed by: UROLOGY

## 2024-12-05 PROCEDURE — 6360000002 HC RX W HCPCS: Performed by: NURSE PRACTITIONER

## 2024-12-05 PROCEDURE — 94761 N-INVAS EAR/PLS OXIMETRY MLT: CPT

## 2024-12-05 PROCEDURE — C1769 GUIDE WIRE: HCPCS | Performed by: UROLOGY

## 2024-12-05 PROCEDURE — C2617 STENT, NON-COR, TEM W/O DEL: HCPCS | Performed by: UROLOGY

## 2024-12-05 PROCEDURE — 51798 US URINE CAPACITY MEASURE: CPT

## 2024-12-05 PROCEDURE — 80048 BASIC METABOLIC PNL TOTAL CA: CPT

## 2024-12-05 DEVICE — URETERAL STENT
Type: IMPLANTABLE DEVICE | Status: FUNCTIONAL
Brand: CONTOUR™

## 2024-12-05 RX ORDER — PHENAZOPYRIDINE HYDROCHLORIDE 100 MG/1
200 TABLET, FILM COATED ORAL
Status: DISCONTINUED | OUTPATIENT
Start: 2024-12-05 | End: 2024-12-06 | Stop reason: HOSPADM

## 2024-12-05 RX ORDER — BISACODYL 5 MG/1
10 TABLET, DELAYED RELEASE ORAL ONCE
Status: COMPLETED | OUTPATIENT
Start: 2024-12-05 | End: 2024-12-05

## 2024-12-05 RX ORDER — OXYBUTYNIN CHLORIDE 5 MG/1
5 TABLET ORAL 3 TIMES DAILY PRN
Qty: 21 TABLET | Refills: 0 | Status: SHIPPED | OUTPATIENT
Start: 2024-12-05 | End: 2024-12-12

## 2024-12-05 RX ORDER — MAGNESIUM HYDROXIDE 1200 MG/15ML
LIQUID ORAL CONTINUOUS PRN
Status: COMPLETED | OUTPATIENT
Start: 2024-12-05 | End: 2024-12-05

## 2024-12-05 RX ORDER — HYDROCODONE BITARTRATE AND ACETAMINOPHEN 5; 325 MG/1; MG/1
1 TABLET ORAL EVERY 6 HOURS PRN
Qty: 20 TABLET | Refills: 0 | Status: SHIPPED | OUTPATIENT
Start: 2024-12-05 | End: 2024-12-10

## 2024-12-05 RX ORDER — TAMSULOSIN HYDROCHLORIDE 0.4 MG/1
0.4 CAPSULE ORAL DAILY
Qty: 30 CAPSULE | Refills: 0 | Status: SHIPPED | OUTPATIENT
Start: 2024-12-05 | End: 2025-01-04

## 2024-12-05 RX ORDER — PEG-3350, SODIUM SULFATE, SODIUM CHLORIDE, POTASSIUM CHLORIDE, SODIUM ASCORBATE AND ASCORBIC ACID 7.5-2.691G
100 KIT ORAL ONCE
Status: COMPLETED | OUTPATIENT
Start: 2024-12-05 | End: 2024-12-05

## 2024-12-05 RX ORDER — AMOXICILLIN 250 MG
1 CAPSULE ORAL 2 TIMES DAILY
Qty: 30 TABLET | Refills: 1 | Status: SHIPPED | OUTPATIENT
Start: 2024-12-05 | End: 2025-01-04

## 2024-12-05 RX ORDER — OXYBUTYNIN CHLORIDE 5 MG/1
5 TABLET ORAL 3 TIMES DAILY
Status: DISCONTINUED | OUTPATIENT
Start: 2024-12-05 | End: 2024-12-06 | Stop reason: HOSPADM

## 2024-12-05 RX ORDER — PHENAZOPYRIDINE HYDROCHLORIDE 100 MG/1
200 TABLET, FILM COATED ORAL 3 TIMES DAILY PRN
Qty: 9 TABLET | Refills: 0 | Status: SHIPPED | OUTPATIENT
Start: 2024-12-05 | End: 2024-12-08

## 2024-12-05 RX ORDER — PROPOFOL 10 MG/ML
INJECTION, EMULSION INTRAVENOUS
Status: DISCONTINUED | OUTPATIENT
Start: 2024-12-05 | End: 2024-12-05 | Stop reason: SDUPTHER

## 2024-12-05 RX ORDER — ONDANSETRON 2 MG/ML
INJECTION INTRAMUSCULAR; INTRAVENOUS
Status: DISCONTINUED | OUTPATIENT
Start: 2024-12-05 | End: 2024-12-05 | Stop reason: SDUPTHER

## 2024-12-05 RX ORDER — LIDOCAINE HYDROCHLORIDE 20 MG/ML
INJECTION, SOLUTION EPIDURAL; INFILTRATION; INTRACAUDAL; PERINEURAL
Status: DISCONTINUED | OUTPATIENT
Start: 2024-12-05 | End: 2024-12-05 | Stop reason: SDUPTHER

## 2024-12-05 RX ORDER — HYDROMORPHONE HYDROCHLORIDE 1 MG/ML
0.5 INJECTION, SOLUTION INTRAMUSCULAR; INTRAVENOUS; SUBCUTANEOUS ONCE
Status: COMPLETED | OUTPATIENT
Start: 2024-12-05 | End: 2024-12-05

## 2024-12-05 RX ADMIN — PEG-3350, SODIUM SULFATE, SODIUM CHLORIDE, POTASSIUM CHLORIDE, SODIUM ASCORBATE AND ASCORBIC ACID 100 G: KIT at 12:45

## 2024-12-05 RX ADMIN — LIDOCAINE HYDROCHLORIDE 100 MG: 20 INJECTION, SOLUTION EPIDURAL; INFILTRATION; INTRACAUDAL; PERINEURAL at 09:52

## 2024-12-05 RX ADMIN — SODIUM CHLORIDE, PRESERVATIVE FREE 10 ML: 5 INJECTION INTRAVENOUS at 02:28

## 2024-12-05 RX ADMIN — HYDROMORPHONE HYDROCHLORIDE 1 MG: 1 INJECTION, SOLUTION INTRAMUSCULAR; INTRAVENOUS; SUBCUTANEOUS at 07:29

## 2024-12-05 RX ADMIN — PHENYLEPHRINE HYDROCHLORIDE 100 MCG: 10 INJECTION INTRAVENOUS at 10:02

## 2024-12-05 RX ADMIN — HYDROMORPHONE HYDROCHLORIDE 1 MG: 1 INJECTION, SOLUTION INTRAMUSCULAR; INTRAVENOUS; SUBCUTANEOUS at 16:55

## 2024-12-05 RX ADMIN — SODIUM CHLORIDE: 9 INJECTION, SOLUTION INTRAVENOUS at 09:41

## 2024-12-05 RX ADMIN — OXYBUTYNIN CHLORIDE 5 MG: 5 TABLET ORAL at 12:43

## 2024-12-05 RX ADMIN — AMLODIPINE BESYLATE 10 MG: 5 TABLET ORAL at 07:28

## 2024-12-05 RX ADMIN — HYDROMORPHONE HYDROCHLORIDE 1 MG: 1 INJECTION, SOLUTION INTRAMUSCULAR; INTRAVENOUS; SUBCUTANEOUS at 22:07

## 2024-12-05 RX ADMIN — SODIUM CHLORIDE, PRESERVATIVE FREE 10 ML: 5 INJECTION INTRAVENOUS at 07:28

## 2024-12-05 RX ADMIN — PROPOFOL 200 MG: 10 INJECTION, EMULSION INTRAVENOUS at 09:52

## 2024-12-05 RX ADMIN — HYDROMORPHONE HYDROCHLORIDE 1 MG: 1 INJECTION, SOLUTION INTRAMUSCULAR; INTRAVENOUS; SUBCUTANEOUS at 02:27

## 2024-12-05 RX ADMIN — PHENYLEPHRINE HYDROCHLORIDE 100 MCG: 10 INJECTION INTRAVENOUS at 09:59

## 2024-12-05 RX ADMIN — SODIUM CHLORIDE, PRESERVATIVE FREE 10 ML: 5 INJECTION INTRAVENOUS at 04:42

## 2024-12-05 RX ADMIN — ATORVASTATIN CALCIUM 10 MG: 10 TABLET, FILM COATED ORAL at 19:53

## 2024-12-05 RX ADMIN — OXYBUTYNIN CHLORIDE 5 MG: 5 TABLET ORAL at 19:53

## 2024-12-05 RX ADMIN — PHENAZOPYRIDINE 200 MG: 100 TABLET ORAL at 12:43

## 2024-12-05 RX ADMIN — HYDROMORPHONE HYDROCHLORIDE 1 MG: 1 INJECTION, SOLUTION INTRAMUSCULAR; INTRAVENOUS; SUBCUTANEOUS at 19:50

## 2024-12-05 RX ADMIN — HYDROMORPHONE HYDROCHLORIDE 0.5 MG: 1 INJECTION, SOLUTION INTRAMUSCULAR; INTRAVENOUS; SUBCUTANEOUS at 04:41

## 2024-12-05 RX ADMIN — CEFAZOLIN 2000 MG: 2 INJECTION, POWDER, FOR SOLUTION INTRAMUSCULAR; INTRAVENOUS at 09:52

## 2024-12-05 RX ADMIN — BISACODYL 10 MG: 5 TABLET, COATED ORAL at 12:20

## 2024-12-05 RX ADMIN — SODIUM CHLORIDE, PRESERVATIVE FREE 10 ML: 5 INJECTION INTRAVENOUS at 19:53

## 2024-12-05 RX ADMIN — PHENAZOPYRIDINE 200 MG: 100 TABLET ORAL at 16:56

## 2024-12-05 RX ADMIN — ONDANSETRON 4 MG: 2 INJECTION INTRAMUSCULAR; INTRAVENOUS at 09:52

## 2024-12-05 ASSESSMENT — PAIN SCALES - WONG BAKER
WONGBAKER_NUMERICALRESPONSE: NO HURT

## 2024-12-05 ASSESSMENT — PAIN SCALES - GENERAL
PAINLEVEL_OUTOF10: 0
PAINLEVEL_OUTOF10: 10
PAINLEVEL_OUTOF10: 7
PAINLEVEL_OUTOF10: 10
PAINLEVEL_OUTOF10: 0
PAINLEVEL_OUTOF10: 7
PAINLEVEL_OUTOF10: 0
PAINLEVEL_OUTOF10: 7
PAINLEVEL_OUTOF10: 4
PAINLEVEL_OUTOF10: 9

## 2024-12-05 ASSESSMENT — PAIN DESCRIPTION - ORIENTATION
ORIENTATION: MID
ORIENTATION: LEFT

## 2024-12-05 ASSESSMENT — PAIN DESCRIPTION - DESCRIPTORS
DESCRIPTORS: ACHING;DISCOMFORT
DESCRIPTORS: BURNING
DESCRIPTORS: ACHING;DISCOMFORT

## 2024-12-05 ASSESSMENT — PAIN DESCRIPTION - LOCATION
LOCATION: PENIS
LOCATION: ABDOMEN

## 2024-12-05 ASSESSMENT — PAIN - FUNCTIONAL ASSESSMENT
PAIN_FUNCTIONAL_ASSESSMENT: 0-10
PAIN_FUNCTIONAL_ASSESSMENT: ACTIVITIES ARE NOT PREVENTED
PAIN_FUNCTIONAL_ASSESSMENT: ACTIVITIES ARE NOT PREVENTED

## 2024-12-05 ASSESSMENT — ENCOUNTER SYMPTOMS: SHORTNESS OF BREATH: 0

## 2024-12-05 ASSESSMENT — PAIN DESCRIPTION - PAIN TYPE: TYPE: ACUTE PAIN

## 2024-12-05 NOTE — PROGRESS NOTES
Pt back to unit from PACU. Vitals obtained. Pt ambulated to bathroom, returned to bed safely. Steady gait observed. Call light in reach. No needs expressed.

## 2024-12-05 NOTE — PROGRESS NOTES
Urology Progress Note  [facility]    Provider: NISHANT Lucio CNP  Patient ID:  Admission Date: 2024 Name: Lindsay Fay  OR Date: 2024 MRN: [id number]   Patient Location: 3AN-3311/3311-01 : 1975  Attending: Yessenia Parish MD Date of Service: 2024  PCP: Elin Calvert MD     Diagnoses:  1. Acute left flank pain    2. History of lithotripsy    3. Intractable pain    4. Acute renal insufficiency         Assessment/Plan:  48 yo male   Underwent L ESWL 12/3/24 for a left renal stone measuring 7-8 mm. NO stent was placed. Did OK but around 5 am this morning was having severe left sided abdominal pain. CT showed small stone fragments in bladder, UVJ and left kidney. There was noted to be mild hydronephrosis. He did pass small stone fragments that we will send for analysis today. His pain is better controlled this afternoon than when he initially presented to the ED.   He is afvss. Cr mildly elevated 1.4. No leukocytosis. No UTI     Recommendations:  NPO for URS this morning, if tolerates anesthesia well and stable post operatively could be discharged home pending surgeon's dictation.     The patient had a chance to ask questions which were answered. he understands the above plan.    Subjective:   Lindsay Fay is a 49 y.o. male. He was seen and examined this morning. Today we discussed ureteroscopy.      Objective:   Vitals:  Vitals:    24 0722   BP: (!) 146/94   Pulse: 88   Resp: 18   Temp: 99.1 °F (37.3 °C)   SpO2: 95%       Intake/Output Summary (Last 24 hours) at 2024 0838  Last data filed at 2024 0706  Gross per 24 hour   Intake 1338.69 ml   Output 2400 ml   Net -1061.31 ml     Physical Exam:  Gen: Alert and oriented x3, no acute distress  CV: Regular rate   Resp: unlabored respirations  Abd: Soft, non-distended, non-tender, no masses  Ext: no peripheral edema noted, moves upper and lower extremities spontaneously  Skin: warmand well perfused, no rashes noted

## 2024-12-05 NOTE — PROGRESS NOTES
Pt reporting 10/10 pain during urination and at L side. Call placed to urology.     1239: Pyridium and Ditropan ordered.

## 2024-12-05 NOTE — OP NOTE
Urology Operative Note  [facility]    Patient: Lindsay Fay  YOB: 1975   MRN: [id number]   Date of Procedure: 12/5/2024  Surgeon: Moe Jimenez MD, ADRIENNE    Preoperative Diagnosis: Left calculus of ureter [N20.1] after ESWL    Postoperative Diagnosis: same    Procedure: Cystourethroscopy, with ureteroscopy and/or pyeloscopy; with basket stone extraction with retrograde pyelogram and insertion of indwelling ureteral stent (eg, Velásquez or double-J type) - LEFT SIDE    Anesthesia: General/LMA    Indications: This patient presents for the above named surgery for ureteral stone. History and physical examination and other relevant patient data were reviewed and is detailed in the preoperative history/consult/progress note.    Findings:   3 mm fragment in the left distal ureter requiring basket stone extraction, multiple other small fragments after ESWL, retrograde Polygram with slight dilation of collecting system but no other findings, cystoscopy otherwise unremarkable    Infection Present At Time Of Surgery (PATOS): No infection present    Estimated Blood Loss (mL): minimal     Complications: none apparent    Specimens: stone for analysis    Implants: Left 6 x 26 double-J ureteral stent (string attached)    Drains: none    Details of Procedure:  The patient was brought to the operating room and placed in the supine position on the operating room table. Following induction of anesthesia the patient was positioned in a lithotomy position, all pressure points were padded, and the genitals were prepped and draped in the usual sterile fashion. A routine timeout was performed, confirming the patient, procedure, site, risk of fire, patient allergies and confirming that preoperative antibiotics had been administered prior to beginning.      A 21 fr rigid cystoscope was advance via the urethra into the bladder. The bladder was inspected and there were no suspicious lesions,

## 2024-12-05 NOTE — PROGRESS NOTES
Shift assessment completed. Routine vitals obtained. Scheduled norvasc and PRN dilaudid administered, see MAR. Pt NPO at this time for possible URS. Patient is awake, alert and oriented. Respirations are easy and unlabored. Patient does not appear to be in distress, resting comfortably at this time. No needs expressed. Wife at bedside. Call light within reach.

## 2024-12-05 NOTE — ANESTHESIA PRE PROCEDURE
9.5 12/05/2024 05:17 AM    BILITOT 0.3 11/13/2024 06:51 AM    ALKPHOS 96 11/13/2024 06:51 AM    AST 18 11/13/2024 06:51 AM    ALT 17 11/13/2024 06:51 AM       POC Tests: No results for input(s): \"POCGLU\", \"POCNA\", \"POCK\", \"POCCL\", \"POCBUN\", \"POCHEMO\", \"POCHCT\" in the last 72 hours.    Coags:   Lab Results   Component Value Date/Time    PROTIME 11.7 06/20/2021 10:23 AM    INR 1.01 06/20/2021 10:23 AM    APTT 36.8 06/20/2021 10:23 AM       HCG (If Applicable): No results found for: \"PREGTESTUR\", \"PREGSERUM\", \"HCG\", \"HCGQUANT\"     ABGs: No results found for: \"PHART\", \"PO2ART\", \"ZYS4RNF\", \"VVO5ZRD\", \"BEART\", \"J4KXJUXV\"     Type & Screen (If Applicable):  No results found for: \"ABORH\", \"LABANTI\"    Drug/Infectious Status (If Applicable):  No results found for: \"HIV\", \"HEPCAB\"    COVID-19 Screening (If Applicable):   Lab Results   Component Value Date/Time    COVID19 Not Detected 11/14/2024 04:54 PM    COVID19 DETECTED 11/25/2021 06:52 AM           Anesthesia Evaluation  Patient summary reviewed and Nursing notes reviewed   no history of anesthetic complications:   Airway: Mallampati: II  TM distance: >3 FB   Neck ROM: full  Mouth opening: > = 3 FB   Dental: normal exam         Pulmonary:normal exam    (+)           asthma:     (-) COPD and shortness of breath                           Cardiovascular:    (+) hypertension:    (-) CABG/stent and dysrhythmias      Rhythm: regular  Rate: normal                 ROS comment: TTE 2019:    Conclusions      Summary   -Normal left ventricle size, wall thickness, and systolic function with an   estimated ejection fraction of 55-60%.   -No regional wall motion abnormalities are seen.   -There is reversal of E/A inflow velocities across the mitral valve.   -Definity contrast administered.   - Mild tricuspid regurgitation.          Neuro/Psych:      (-) seizures, TIA and CVA           GI/Hepatic/Renal:   (+) renal disease: kidney stones     (-) GERD and liver disease       Endo/Other:

## 2024-12-05 NOTE — ANESTHESIA POSTPROCEDURE EVALUATION
Department of Anesthesiology  Postprocedure Note    Patient: Lindsay Fay  MRN: [id number]  YOB: 1975  Date of evaluation: 12/5/2024    Procedure Summary       Date: 12/05/24 Room / Location: [address]    Anesthesia Start: 0949 Anesthesia Stop: 1026    Procedure: CYSTOSCOPY, LEFT URETEROSCOPY, STONE BASKET, STONE MANIPULATION, LEFT STENT INSERTION (Left: Bladder) Diagnosis:       Stone, kidney      (Stone, kidney [N20.0])    Surgeons: Moe Jimenez MD Responsible Provider: Timothy Mcelroy MD    Anesthesia Type: MAC ASA Status: 2            Anesthesia Type: No value filed.    Joe Phase I: Joe Score: 10    Joe Phase II:      Anesthesia Post Evaluation    Patient location during evaluation: PACU  Patient participation: complete - patient participated  Level of consciousness: awake  Airway patency: patent  Nausea & Vomiting: no nausea and no vomiting  Cardiovascular status: hemodynamically stable  Respiratory status: acceptable  Hydration status: stable  Multimodal analgesia pain management approach  Pain management: adequate    No notable events documented.

## 2024-12-05 NOTE — CARE COORDINATION
Discharge Planning Assessment:     Patient admitted as Observation/OPIB with an anticipated short hospitalization length of stay. Chart reviewed and it appears that patient has minimal needs for discharge at this time. Discussed with patient’s nurse and requested that case management be notified if discharge needs are identified.     *Case management will continue to follow progress and update discharge plan as needed.   Electronically signed by SUZANNA Suarez on 12/5/24 at 2:55 PM EST

## 2024-12-05 NOTE — PROGRESS NOTES
Phase 1 discharge criteria met.  VSS.  Pt denies pain.  Pt will transfer to 3A in stable condition

## 2024-12-05 NOTE — PROGRESS NOTES
Pt continues to report pain in L side during voiding. Urology paged. KUB and bladder scan ordered.     1600: Pt states he feels that he is unable to completely empty bladder. PVR bladder scan 184. Pt off unit for xray.

## 2024-12-05 NOTE — PROGRESS NOTES
chloride, , PRN  potassium chloride, 40 mEq, PRN   Or  potassium alternative oral replacement, 40 mEq, PRN   Or  potassium chloride, 10 mEq, PRN  magnesium sulfate, 2,000 mg, PRN  ondansetron, 4 mg, Q8H PRN   Or  ondansetron, 4 mg, Q6H PRN  polyethylene glycol, 17 g, Daily PRN  acetaminophen, 650 mg, Q6H PRN   Or  acetaminophen, 650 mg, Q6H PRN  rimegepant sulfate, 75 mg, Daily PRN        Labs and Imaging   CT ABDOMEN PELVIS WO CONTRAST Additional Contrast? None    Result Date: 12/4/2024  EXAMINATION: CT OF THE ABDOMEN AND PELVIS WITHOUT CONTRAST 12/4/2024 9:00 am TECHNIQUE: CT of the abdomen and pelvis was performed without the administration of intravenous contrast. Multiplanar reformatted images are provided for review. Automated exposure control, iterative reconstruction, and/or weight based adjustment of the mA/kV was utilized to reduce the radiation dose to as low as reasonably achievable. COMPARISON: 13 and 14 November CT. HISTORY: ORDERING SYSTEM PROVIDED HISTORY: left flank pain, S/P lithotripsy yesterday TECHNOLOGIST PROVIDED HISTORY: Reason for exam:->left flank pain, S/P lithotripsy yesterday Additional Contrast?->None Decision Support Exception - unselect if not a suspected or confirmed emergency medical condition->Emergency Medical Condition (MA) Reason for Exam: left flank pain, S/P lithotripsy yesterday FINDINGS: Lower Chest: Normal bronchovascular markings. No effusion, pneumothorax or airspace process. Organs: Liver: Normal Gallbladder: Normal Pancreas: Normal Adrenal glands: Normal Kidneys: Several tiny stone fragments identified in the lower pole of the left kidney.  There is mild hydro ureter and hydronephrosis.  There is a small stone in the left UVJ junction and a small stone in the bladder.  The right kidney is unremarkable.  No worrisome mass. Spleen: Normal GI/Bowel: Stomach: Small hiatal hernia.  The stomach is otherwise normal Small bowel: Unremarkable. Colon: Mild scattered colonic  12/04/2024 08:11 AM    GLUCOSEU 100 12/04/2024 08:11 AM    KETUA Negative 12/04/2024 08:11 AM     Urine Cultures: No results found for: \"LABURIN\"  Blood Cultures:   Lab Results   Component Value Date/Time    BC No Growth after 4 days of incubation. 11/30/2021 04:45 PM     Lab Results   Component Value Date/Time    BLOODCULT2 No Growth after 4 days of incubation. 11/30/2021 04:44 PM     Organism: No results found for: \"ORG\"      Electronically signed by Yessenia Parish MD on 12/5/2024 at 10:17 AM

## 2024-12-06 ENCOUNTER — CARE COORDINATION (OUTPATIENT)
Dept: CASE MANAGEMENT | Age: 49
End: 2024-12-06

## 2024-12-06 VITALS
OXYGEN SATURATION: 96 % | SYSTOLIC BLOOD PRESSURE: 145 MMHG | HEIGHT: 71 IN | HEART RATE: 106 BPM | WEIGHT: 220 LBS | TEMPERATURE: 97.8 F | DIASTOLIC BLOOD PRESSURE: 101 MMHG | BODY MASS INDEX: 30.8 KG/M2 | RESPIRATION RATE: 18 BRPM

## 2024-12-06 PROBLEM — K59.00 CONSTIPATION: Status: ACTIVE | Noted: 2024-12-06

## 2024-12-06 PROBLEM — N18.2 CKD (CHRONIC KIDNEY DISEASE) STAGE 2, GFR 60-89 ML/MIN: Status: ACTIVE | Noted: 2024-12-06

## 2024-12-06 LAB
ALBUMIN SERPL-MCNC: 3.9 G/DL (ref 3.4–5)
ANION GAP SERPL CALCULATED.3IONS-SCNC: 15 MMOL/L (ref 3–16)
BUN SERPL-MCNC: 10 MG/DL (ref 7–20)
CALCIUM SERPL-MCNC: 9.1 MG/DL (ref 8.3–10.6)
CHLORIDE SERPL-SCNC: 103 MMOL/L (ref 99–110)
CO2 SERPL-SCNC: 22 MMOL/L (ref 21–32)
CREAT SERPL-MCNC: 1.3 MG/DL (ref 0.9–1.3)
GFR SERPLBLD CREATININE-BSD FMLA CKD-EPI: 66 ML/MIN/{1.73_M2}
GLUCOSE SERPL-MCNC: 97 MG/DL (ref 70–99)
PHOSPHATE SERPL-MCNC: 3 MG/DL (ref 2.5–4.9)
POTASSIUM SERPL-SCNC: 4.2 MMOL/L (ref 3.5–5.1)
SODIUM SERPL-SCNC: 140 MMOL/L (ref 136–145)

## 2024-12-06 PROCEDURE — 6360000002 HC RX W HCPCS: Performed by: UROLOGY

## 2024-12-06 PROCEDURE — G0378 HOSPITAL OBSERVATION PER HR: HCPCS

## 2024-12-06 PROCEDURE — 96376 TX/PRO/DX INJ SAME DRUG ADON: CPT

## 2024-12-06 PROCEDURE — 36415 COLL VENOUS BLD VENIPUNCTURE: CPT

## 2024-12-06 PROCEDURE — 6370000000 HC RX 637 (ALT 250 FOR IP): Performed by: UROLOGY

## 2024-12-06 PROCEDURE — 80069 RENAL FUNCTION PANEL: CPT

## 2024-12-06 PROCEDURE — 96372 THER/PROPH/DIAG INJ SC/IM: CPT

## 2024-12-06 PROCEDURE — 2580000003 HC RX 258: Performed by: UROLOGY

## 2024-12-06 PROCEDURE — 6370000000 HC RX 637 (ALT 250 FOR IP): Performed by: NURSE PRACTITIONER

## 2024-12-06 RX ORDER — SENNA AND DOCUSATE SODIUM 50; 8.6 MG/1; MG/1
2 TABLET, FILM COATED ORAL DAILY
Status: DISCONTINUED | OUTPATIENT
Start: 2024-12-06 | End: 2024-12-06 | Stop reason: HOSPADM

## 2024-12-06 RX ORDER — POLYETHYLENE GLYCOL 3350 17 G/17G
17 POWDER, FOR SOLUTION ORAL DAILY
Status: DISCONTINUED | OUTPATIENT
Start: 2024-12-06 | End: 2024-12-06 | Stop reason: HOSPADM

## 2024-12-06 RX ORDER — POLYETHYLENE GLYCOL 3350 17 G/17G
17 POWDER, FOR SOLUTION ORAL DAILY
COMMUNITY
Start: 2024-12-06 | End: 2025-01-05

## 2024-12-06 RX ORDER — AMLODIPINE BESYLATE 10 MG/1
10 TABLET ORAL DAILY
Qty: 30 TABLET | Refills: 0
Start: 2024-12-06

## 2024-12-06 RX ADMIN — HYDROMORPHONE HYDROCHLORIDE 1 MG: 1 INJECTION, SOLUTION INTRAMUSCULAR; INTRAVENOUS; SUBCUTANEOUS at 01:10

## 2024-12-06 RX ADMIN — SODIUM CHLORIDE, PRESERVATIVE FREE 10 ML: 5 INJECTION INTRAVENOUS at 08:41

## 2024-12-06 RX ADMIN — PHENAZOPYRIDINE 200 MG: 100 TABLET ORAL at 11:37

## 2024-12-06 RX ADMIN — PHENAZOPYRIDINE 200 MG: 100 TABLET ORAL at 08:40

## 2024-12-06 RX ADMIN — NALOXEGOL OXALATE 25 MG: 25 TABLET, FILM COATED ORAL at 05:18

## 2024-12-06 RX ADMIN — OXYBUTYNIN CHLORIDE 5 MG: 5 TABLET ORAL at 08:41

## 2024-12-06 RX ADMIN — HYDROMORPHONE HYDROCHLORIDE 1 MG: 1 INJECTION, SOLUTION INTRAMUSCULAR; INTRAVENOUS; SUBCUTANEOUS at 15:36

## 2024-12-06 RX ADMIN — HYDROMORPHONE HYDROCHLORIDE 1 MG: 1 INJECTION, SOLUTION INTRAMUSCULAR; INTRAVENOUS; SUBCUTANEOUS at 08:40

## 2024-12-06 RX ADMIN — TAMSULOSIN HYDROCHLORIDE 0.4 MG: 0.4 CAPSULE ORAL at 08:41

## 2024-12-06 RX ADMIN — ENOXAPARIN SODIUM 40 MG: 100 INJECTION SUBCUTANEOUS at 08:40

## 2024-12-06 RX ADMIN — AMLODIPINE BESYLATE 10 MG: 5 TABLET ORAL at 08:41

## 2024-12-06 RX ADMIN — HYDROMORPHONE HYDROCHLORIDE 1 MG: 1 INJECTION, SOLUTION INTRAMUSCULAR; INTRAVENOUS; SUBCUTANEOUS at 05:16

## 2024-12-06 RX ADMIN — FOLIC ACID 1 MG: 1 TABLET ORAL at 08:41

## 2024-12-06 RX ADMIN — OXYBUTYNIN CHLORIDE 5 MG: 5 TABLET ORAL at 15:36

## 2024-12-06 ASSESSMENT — PAIN DESCRIPTION - DESCRIPTORS
DESCRIPTORS: SHARP;ACHING
DESCRIPTORS: ACHING;DISCOMFORT;BURNING
DESCRIPTORS: ACHING;DISCOMFORT

## 2024-12-06 ASSESSMENT — PAIN SCALES - GENERAL
PAINLEVEL_OUTOF10: 8
PAINLEVEL_OUTOF10: 0
PAINLEVEL_OUTOF10: 8
PAINLEVEL_OUTOF10: 9
PAINLEVEL_OUTOF10: 5
PAINLEVEL_OUTOF10: 8
PAINLEVEL_OUTOF10: 8

## 2024-12-06 ASSESSMENT — PAIN - FUNCTIONAL ASSESSMENT
PAIN_FUNCTIONAL_ASSESSMENT: ACTIVITIES ARE NOT PREVENTED
PAIN_FUNCTIONAL_ASSESSMENT: PREVENTS OR INTERFERES SOME ACTIVE ACTIVITIES AND ADLS

## 2024-12-06 ASSESSMENT — PAIN DESCRIPTION - LOCATION
LOCATION: ABDOMEN
LOCATION: FLANK;ABDOMEN

## 2024-12-06 NOTE — PROGRESS NOTES
Urology Progress Note  [facility]     Patient: Lindsay Fay MRN: [id number]  Room/Bed: [facility]-3311/3311-01   YOB: 1975  Age/Sex: 49 y.o.male  Admission Date: 12/4/2024     Date of Service:  12/6/2024    ASSESSMENT/PLAN     1. Acute left flank pain    2. History of lithotripsy    3. Intractable pain    4. Acute renal insufficiency    5. Stone, kidney    6. Renal colic on left side      48 yo male   Underwent L ESWL 12/3/24 for a left renal stone measuring 7-8 mm. NO stent was placed. Did OK but around 5 am this morning was having severe left sided abdominal pain. CT showed small stone fragments in bladder, UVJ and left kidney. There was noted to be mild hydronephrosis. He did pass small stone fragments that we will send for analysis today. His pain is better controlled this afternoon than when he initially presented to the ED.   ==  Now s/p left URS, stone extraction, stent to string placement 12/5/24. He is stable but having severe stent related pain.     Recommendations:  Continue pain control  Pyridium and oxybutynin 5 mg TID  Recommended to keep stent in place for 1 week post op. He is completely unable to tolerate stent. Reviewed with Dr. Jimenez. OK to remove stent today if stent related pain not controlled with pyridium and oxybutynin. I did discuss possibility of needing another stent if current stent removed prematurely - pt understood. He will need a NICOLE in 6 weeks with follow up afterwards.   OK for discharge from  standpoint once medically cleared.     All patient questions were answered. He understands the plan as listed above.    SUBJECTIVE     Chief Complaint:   Chief Complaint   Patient presents with    Flank Pain     [facility] performed. Pt reports increased pain to left side that started approx 2 hrs ago. Pt reports passing a stone last night. Pt writhing in pain in triage.        24 Hour Events: Severe stent related pain  9:48 AM  [facility]  Office Contact: [phone]

## 2024-12-06 NOTE — PROGRESS NOTES
CLINICAL PHARMACY NOTE: MEDS TO BEDS    Total # of Prescriptions Filled: 5   The following medications were delivered to the patient:  PHENAZOPYRIDINE HCL 100MG TABS  OXYBUTYNIN CHLORIDE 5MG TABS  HYDROCODONE/APAP 5 - 325 MG TAB  TAMSULOSIN HCL 0.4MG CAPS  STIMULANT LAXATIVE 8.6 - 50MG TABS    Additional Documentation: delivered to Maribell RN=signed  [facility]

## 2024-12-06 NOTE — DISCHARGE SUMMARY
PROVIDED HISTORY: left flank pain, S/P lithotripsy yesterday TECHNOLOGIST PROVIDED HISTORY: Reason for exam:->left flank pain, S/P lithotripsy yesterday Additional Contrast?->None Decision Support Exception - unselect if not a suspected or confirmed emergency medical condition->Emergency Medical Condition (MA) Reason for Exam: left flank pain, S/P lithotripsy yesterday FINDINGS: Lower Chest: Normal bronchovascular markings. No effusion, pneumothorax or airspace process. Organs: Liver: Normal Gallbladder: Normal Pancreas: Normal Adrenal glands: Normal Kidneys: Several tiny stone fragments identified in the lower pole of the left kidney.  There is mild hydro ureter and hydronephrosis.  There is a small stone in the left UVJ junction and a small stone in the bladder.  The right kidney is unremarkable.  No worrisome mass. Spleen: Normal GI/Bowel: Stomach: Small hiatal hernia.  The stomach is otherwise normal Small bowel: Unremarkable. Colon: Mild scattered colonic diverticulum. Pelvis: The bladder is normal.  Prostate gland is normal.  No adenopathy or free fluid. Peritoneum/Retroperitoneum: No retroperitoneal adenopathy or free air. Bones/Soft Tissues: Degenerative changes of the spine most pronounced L5-S1. No lytic or blastic lesion.     Multiple tiny stone fragments, lower pole left kidney.  Mild left hydro ureter and hydronephrosis with mildly obstructing stone in the left UVJ. Stone in the bladder.  Findings would be consistent with recent left though trips E.       CBC:   No results for input(s): \"WBC\", \"HGB\", \"PLT\" in the last 72 hours.    BMP:    Recent Labs     12/06/24  0557      K 4.2      CO2 22   BUN 10   CREATININE 1.3   GLUCOSE 97     Hepatic: No results for input(s): \"AST\", \"ALT\", \"BILITOT\", \"ALKPHOS\" in the last 72 hours.    Invalid input(s): \"ALB\"  Lipids:   Lab Results   Component Value Date/Time    CHOL 225 07/30/2024 04:19 PM    HDL 52 07/30/2024 04:19 PM    TRIG 61 07/30/2024 04:19 PM

## 2024-12-06 NOTE — PROGRESS NOTES
V2.0    [facility] Progress Note      Name:  Lindsay Fay /Age/Sex: 1975  (49 y.o. male)   MRN & CSN:  [id number] & [id number] Encounter Date/Time: 2024 10:14 AM EST   Location:  [address] [place] [address]- PCP: Elin Calvert MD     Attending:Yessenia Parish MD       Hospital Day: 3    Assessment and Recommendations   Lindsay Fay is a 49 y.o. male with pmh of migraine headaches, Asthma, CKD 2-3, HTN, HLD, prediabetes and nephrolithiasis status post lithotripsy on 12/3 outpatient presented from home with complaints of severe left-sided abdominal pain.  CT A/P with Mild left hydro ureter and hydronephrosis with mildly obstructing stone in the left UVJ.        Plan:     Left renal colic:  CT:Mild left hydro ureter and hydronephrosis with mildly obstructing stone in the left UVJ.   Urology consulted; s/p left URS, stone extraction, stent to string placement .   Continue pain control, Flomax, oxybutynin and and Pyridium.  Discharge home when pain is better controlled.     HTN: Monitor BP on home medications.     Constipation: Started on bowel regimen.     CKD 2- 3: Stable renal function.      Diet ADULT DIET; Regular   DVT Prophylaxis [x] Lovenox, []  Heparin, [] SCDs, [] Ambulation,  [] Eliquis, [] Xarelto  [] Coumadin   Code Status Full Code   Disposition From: Home  Expected Disposition: Home  Estimated Date of Discharge: 2024  Patient requires continued admission due to constipation, left renal colic   Surrogate Decision Maker/ Yeni Ferrara (Spouse)  [phone]     Personally reviewed Lab Studies and Imaging       Subjective:     Chief Complaint: Abdominal pain    Patient seen and examined.   Constipation resolved.  Complaining of severe left-sided flank pain and dysuria.      Review of Systems:      A 10-12 system review obtained and updated today and is unremarkable except as mentioned  in my interval history.     Objective:   No intake or output data in the 24 hours ending 24 1150    Degenerative changes of the spine most pronounced L5-S1. No lytic or blastic lesion.     Multiple tiny stone fragments, lower pole left kidney.  Mild left hydro ureter and hydronephrosis with mildly obstructing stone in the left UVJ. Stone in the bladder.  Findings would be consistent with recent left though trips E.       CBC:   Recent Labs     12/04/24  0800 12/05/24  0517   WBC 5.7 7.0   HGB 14.0 14.2    313     BMP:    Recent Labs     12/04/24  0800 12/05/24  0517 12/06/24  0557    144 140   K 3.4* 3.8 4.2    106 103   CO2 23 25 22   BUN 12 8 10   CREATININE 1.4* 1.3 1.3   GLUCOSE 153* 116* 97     Hepatic: No results for input(s): \"AST\", \"ALT\", \"BILITOT\", \"ALKPHOS\" in the last 72 hours.    Invalid input(s): \"ALB\"  Lipids:   Lab Results   Component Value Date/Time    CHOL 225 07/30/2024 04:19 PM    HDL 52 07/30/2024 04:19 PM    TRIG 61 07/30/2024 04:19 PM     Hemoglobin A1C:   Lab Results   Component Value Date/Time    LABA1C 5.4 07/30/2024 04:19 PM     TSH:   Lab Results   Component Value Date/Time    TSH 1.14 07/30/2024 04:19 PM     Troponin: No results found for: \"TROPONINT\"  Lactic Acid: No results for input(s): \"LACTA\" in the last 72 hours.  BNP: No results for input(s): \"PROBNP\" in the last 72 hours.  UA:  Lab Results   Component Value Date/Time    NITRU Negative 12/04/2024 08:11 AM    COLORU Yellow 12/04/2024 08:11 AM    PHUR 7.0 12/04/2024 08:11 AM    PHUR 5.5 11/12/2024 10:45 AM    PHUR 7.5 06/20/2021 05:15 PM    WBCUA 1 12/04/2024 08:11 AM    RBCUA 31 12/04/2024 08:11 AM    BACTERIA None Seen 12/04/2024 08:11 AM    CLARITYU Clear 12/04/2024 08:11 AM    SPECGRAV >=1.030 11/12/2024 10:45 AM    LEUKOCYTESUR Negative 12/04/2024 08:11 AM    UROBILINOGEN 0.2 12/04/2024 08:11 AM    BILIRUBINUR Negative 12/04/2024 08:11 AM    BLOODU LARGE 12/04/2024 08:11 AM    GLUCOSEU 100 12/04/2024 08:11 AM    KETUA Negative 12/04/2024 08:11 AM     Urine Cultures: No results found for: \"LABURIN\"  Blood

## 2024-12-07 LAB
APPEARANCE STONE: NORMAL
APPEARANCE STONE: NORMAL
COMPN STONE: NORMAL
COMPN STONE: NORMAL
SPECIMEN WT: 12 MG
SPECIMEN WT: 41 MG

## 2024-12-09 ENCOUNTER — CARE COORDINATION (OUTPATIENT)
Dept: CASE MANAGEMENT | Age: 49
End: 2024-12-09

## 2024-12-09 NOTE — CARE COORDINATION
Care Transitions Note    Initial Call - Call within 2 business days of discharge: Yes    Attempted to reach patient for transitions of care follow up. Unable to reach patient.    Outreach Attempts:   HIPAA compliant voicemail left for patient.     Patient: Lindsay Fay    Patient : 1975   MRN: [id number]    Reason for Admission:   Discharge Date: 24  RURS: No data recorded  Last Discharge Facility       Date Complaint Diagnosis Description Type Department Provider    24 Flank Pain Acute left flank pain ... ED to Hosp-Admission (Discharged) (ADMITTED) [facility] Yessenia Parish MD; Jamie Carson,...            Was this an external facility discharge? No    Follow Up Appointment:   Patient has hospital follow up appointment scheduled greater than 14 days after discharge;  .    Future Appointments         Provider Specialty Dept Phone    2025 8:40 AM Elin Calvert MD Internal Medicine [phone]    3/3/2025 11:15 AM Venancio Hennessy MD Neurology [phone]            Plan for follow-up on next business day.      Cedric Yepez RN

## 2024-12-10 ENCOUNTER — CARE COORDINATION (OUTPATIENT)
Dept: CASE MANAGEMENT | Age: 49
End: 2024-12-10

## 2024-12-10 ENCOUNTER — TELEPHONE (OUTPATIENT)
Dept: INTERNAL MEDICINE CLINIC | Age: 49
End: 2024-12-10

## 2024-12-10 NOTE — TELEPHONE ENCOUNTER
Care Transitions Initial Follow Up Call    Outreach made within 2 business days of discharge: Yes    Patient: Lindsay Fay Patient : 1975   MRN: [id number]    Reason for Admission: acute left flank pain  Discharge Date: 24       Spoke with:   sher    Discharge department/facility: [facility] Interactive Patient Contact:  Was patient able to fill all prescriptions: na  Was patient instructed to bring all medications to the follow-up visit: na  Is patient taking all medications as directed in the discharge summary? na  Does patient understand their discharge instructions: No: na  Does patient have questions or concerns that need addressed prior to 7-14 day follow up office visit: yes - na    Additional needs identified to be addressed with provider  na             Scheduled appointment with PCP within 7-14 days    Follow Up  Future Appointments   Date Time Provider Department Center   2025  8:40 AM Elin Calvert MD [facility]   3/3/2025 11:15 AM Venancio Hennessy MD  NEURO Neurology -       Jaz Paiz LPN

## 2024-12-10 NOTE — CARE COORDINATION
Care Transitions Note    Initial Call - Call within 2 business days of discharge: Yes    Attempted to reach patient for transitions of care follow up. Unable to reach patient.    Outreach Attempts:   HIPAA compliant voicemail left for patient.     Patient: Lindsay Fay    Patient : 1975   MRN: [id number]    Reason for Admission:   Discharge Date: 24  RURS: No data recorded  Last Discharge Facility       Date Complaint Diagnosis Description Type Department Provider    24 Flank Pain Acute left flank pain ... ED to Hosp-Admission (Discharged) (ADMITTED) [facility] Yessenia Parish MD; Jamie Carson,...            Was this an external facility discharge? No    Follow Up Appointment:   Patient has hospital follow up appointment scheduled greater than 14 days after discharge; will reach out to PCP office.    Future Appointments         Provider Specialty Dept Phone    2025 8:40 AM Elin Calvert MD Internal Medicine [phone]    3/3/2025 11:15 AM Venancio Hennessy MD Neurology [phone]            No further follow-up call indicated     Cedric Yepez RN

## 2024-12-11 ENCOUNTER — TELEPHONE (OUTPATIENT)
Dept: INTERNAL MEDICINE CLINIC | Age: 49
End: 2024-12-11

## 2024-12-11 NOTE — TELEPHONE ENCOUNTER
Care Transitions Initial Follow Up Call    Outreach made within 2 business days of discharge: Yes    Patient: Lindsay Fay Patient : 1975   MRN: [id number]  Reason for Admission: acute left flank pain  Discharge Date: 24       Spoke with: zebm to return call to review discharge as well as assist wit hospital follow up    Discharge department/facility: [facility]     [facility] Interactive Patient Contact:  Was patient able to fill all prescriptions: na  Was patient instructed to bring all medications to the follow-up visit:  na  Is patient taking all medications as directed in the discharge summary? na  Does patient understand their discharge instructions:  na  Does patient have questions or concerns that need addressed prior to 7-14 day follow up office visit: na    Additional needs identified to be addressed with provider  na             Scheduled appointment with PCP within 7-14 days    Follow Up  Future Appointments   Date Time Provider Department Center   2025  8:40 AM Elin Calvert MD [facility]   3/3/2025 11:15 AM Venancio Hennessy MD  NEURO Neurology -       Jaz Paiz LPN

## 2025-01-22 ENCOUNTER — OFFICE VISIT (OUTPATIENT)
Dept: INTERNAL MEDICINE CLINIC | Age: 50
End: 2025-01-22

## 2025-01-22 VITALS
TEMPERATURE: 98.5 F | SYSTOLIC BLOOD PRESSURE: 134 MMHG | OXYGEN SATURATION: 99 % | DIASTOLIC BLOOD PRESSURE: 86 MMHG | BODY MASS INDEX: 32.78 KG/M2 | HEART RATE: 80 BPM | WEIGHT: 235 LBS

## 2025-01-22 DIAGNOSIS — J06.9 URTI (ACUTE UPPER RESPIRATORY INFECTION): ICD-10-CM

## 2025-01-22 DIAGNOSIS — Z12.11 COLON CANCER SCREENING: ICD-10-CM

## 2025-01-22 DIAGNOSIS — G43.811 OTHER MIGRAINE WITH STATUS MIGRAINOSUS, INTRACTABLE: ICD-10-CM

## 2025-01-22 DIAGNOSIS — E78.2 MIXED HYPERLIPIDEMIA: ICD-10-CM

## 2025-01-22 DIAGNOSIS — R73.03 PREDIABETES: ICD-10-CM

## 2025-01-22 DIAGNOSIS — J45.20 MILD INTERMITTENT ASTHMA WITHOUT COMPLICATION: ICD-10-CM

## 2025-01-22 DIAGNOSIS — N20.0 NEPHROLITHIASIS: ICD-10-CM

## 2025-01-22 DIAGNOSIS — Z12.5 SPECIAL SCREENING, PROSTATE CANCER: ICD-10-CM

## 2025-01-22 DIAGNOSIS — I10 HTN (HYPERTENSION), BENIGN: ICD-10-CM

## 2025-01-22 DIAGNOSIS — Z00.00 PREVENTATIVE HEALTH CARE: Primary | ICD-10-CM

## 2025-01-22 DIAGNOSIS — Z13.220 SCREENING FOR CHOLESTEROL LEVEL: ICD-10-CM

## 2025-01-22 RX ORDER — ATORVASTATIN CALCIUM 10 MG/1
10 TABLET, FILM COATED ORAL DAILY
Qty: 90 TABLET | Refills: 1 | Status: SHIPPED | OUTPATIENT
Start: 2025-01-22

## 2025-01-22 RX ORDER — AMOXICILLIN 875 MG/1
875 TABLET, COATED ORAL 2 TIMES DAILY
COMMUNITY
Start: 2025-01-17 | End: 2025-01-22 | Stop reason: ALTCHOICE

## 2025-01-22 RX ORDER — FOLIC ACID 1 MG/1
1 TABLET ORAL DAILY
Qty: 90 TABLET | Refills: 1 | Status: SHIPPED | OUTPATIENT
Start: 2025-01-22

## 2025-01-22 RX ORDER — AZITHROMYCIN 250 MG/1
TABLET, FILM COATED ORAL
Qty: 6 TABLET | Refills: 0 | Status: SHIPPED | OUTPATIENT
Start: 2025-01-22 | End: 2025-02-01

## 2025-01-22 RX ORDER — PREDNISONE 10 MG/1
10 TABLET ORAL 2 TIMES DAILY
Qty: 10 TABLET | Refills: 0 | Status: SHIPPED | OUTPATIENT
Start: 2025-01-22 | End: 2025-01-27

## 2025-01-22 RX ORDER — BENZONATATE 200 MG/1
200 CAPSULE ORAL 2 TIMES DAILY PRN
COMMUNITY
Start: 2025-01-17

## 2025-01-22 RX ORDER — AMLODIPINE BESYLATE 10 MG/1
10 TABLET ORAL DAILY
Qty: 30 TABLET | Refills: 5 | Status: SHIPPED | OUTPATIENT
Start: 2025-01-22

## 2025-01-22 RX ORDER — TAMSULOSIN HYDROCHLORIDE 0.4 MG/1
0.4 CAPSULE ORAL DAILY
Qty: 30 CAPSULE | Refills: 0 | Status: CANCELLED | OUTPATIENT
Start: 2025-01-22 | End: 2025-02-21

## 2025-01-22 RX ORDER — OXYBUTYNIN CHLORIDE 5 MG/1
5 TABLET ORAL 3 TIMES DAILY PRN
Qty: 21 TABLET | Refills: 0 | Status: CANCELLED | OUTPATIENT
Start: 2025-01-22 | End: 2025-01-29

## 2025-01-22 RX ORDER — RIMEGEPANT SULFATE 75 MG/75MG
75 TABLET, ORALLY DISINTEGRATING ORAL DAILY PRN
Qty: 4 TABLET | Refills: 5 | Status: SHIPPED | OUTPATIENT
Start: 2025-01-22

## 2025-01-22 SDOH — ECONOMIC STABILITY: FOOD INSECURITY: WITHIN THE PAST 12 MONTHS, YOU WORRIED THAT YOUR FOOD WOULD RUN OUT BEFORE YOU GOT MONEY TO BUY MORE.: NEVER TRUE

## 2025-01-22 SDOH — ECONOMIC STABILITY: TRANSPORTATION INSECURITY
IN THE PAST 12 MONTHS, HAS THE LACK OF TRANSPORTATION KEPT YOU FROM MEDICAL APPOINTMENTS OR FROM GETTING MEDICATIONS?: NO

## 2025-01-22 SDOH — ECONOMIC STABILITY: FOOD INSECURITY: WITHIN THE PAST 12 MONTHS, THE FOOD YOU BOUGHT JUST DIDN'T LAST AND YOU DIDN'T HAVE MONEY TO GET MORE.: NEVER TRUE

## 2025-01-22 SDOH — ECONOMIC STABILITY: INCOME INSECURITY: IN THE LAST 12 MONTHS, WAS THERE A TIME WHEN YOU WERE NOT ABLE TO PAY THE MORTGAGE OR RENT ON TIME?: NO

## 2025-01-22 SDOH — ECONOMIC STABILITY: TRANSPORTATION INSECURITY
IN THE PAST 12 MONTHS, HAS LACK OF TRANSPORTATION KEPT YOU FROM MEETINGS, WORK, OR FROM GETTING THINGS NEEDED FOR DAILY LIVING?: NO

## 2025-01-22 ASSESSMENT — PATIENT HEALTH QUESTIONNAIRE - PHQ9
SUM OF ALL RESPONSES TO PHQ QUESTIONS 1-9: 0
2. FEELING DOWN, DEPRESSED OR HOPELESS: NOT AT ALL
1. LITTLE INTEREST OR PLEASURE IN DOING THINGS: NOT AT ALL
1. LITTLE INTEREST OR PLEASURE IN DOING THINGS: NOT AT ALL
SUM OF ALL RESPONSES TO PHQ QUESTIONS 1-9: 0
2. FEELING DOWN, DEPRESSED OR HOPELESS: NOT AT ALL
SUM OF ALL RESPONSES TO PHQ QUESTIONS 1-9: 0
SUM OF ALL RESPONSES TO PHQ QUESTIONS 1-9: 0
SUM OF ALL RESPONSES TO PHQ9 QUESTIONS 1 & 2: 0
SUM OF ALL RESPONSES TO PHQ9 QUESTIONS 1 & 2: 0

## 2025-01-22 ASSESSMENT — ENCOUNTER SYMPTOMS
SORE THROAT: 0
COUGH: 1
COLOR CHANGE: 0
VOMITING: 0
CHEST TIGHTNESS: 0
ABDOMINAL PAIN: 1
SHORTNESS OF BREATH: 0
BLURRED VISION: 0
CONSTIPATION: 0
BLOOD IN STOOL: 0
WHEEZING: 0
SINUS PAIN: 0

## 2025-01-22 NOTE — PROGRESS NOTES
rate and regular rhythm.      Pulses: Normal pulses.      Heart sounds: No murmur heard.  Pulmonary:      Effort: Pulmonary effort is normal. No respiratory distress.      Breath sounds: Normal breath sounds.   Abdominal:      General: Abdomen is flat. Bowel sounds are normal. There is no distension.      Palpations: Abdomen is soft.      Tenderness: There is no abdominal tenderness.   Musculoskeletal:         General: No swelling, tenderness or deformity.      Cervical back: Normal range of motion and neck supple. No rigidity or tenderness.      Right lower leg: No edema.      Left lower leg: No edema.   Lymphadenopathy:      Cervical: No cervical adenopathy.   Skin:     Coloration: Skin is not jaundiced.      Findings: No bruising, erythema or lesion.   Neurological:      General: No focal deficit present.      Mental Status: He is alert and oriented to person, place, and time.      Cranial Nerves: No cranial nerve deficit.      Motor: No weakness.      Gait: Gait normal.            This dictation was generated by voice recognition computer software.  Although all attempts are made to edit the dictation for accuracy, there may be errors in the transcription that are not intended.    An electronic signature was used to authenticate this note.    --Elin Calvert MD

## 2025-01-29 ENCOUNTER — TELEPHONE (OUTPATIENT)
Dept: INTERNAL MEDICINE CLINIC | Age: 50
End: 2025-01-29

## 2025-01-29 NOTE — TELEPHONE ENCOUNTER
Forms   On what date did you advise your patient to stop working?  Please list all current diagnosis code and cpt4 procedure codes pretaining to the patient current absence from work    1-9 questions STD, FMLA forms

## 2025-03-17 ENCOUNTER — TELEPHONE (OUTPATIENT)
Dept: ADMINISTRATIVE | Age: 50
End: 2025-03-17

## 2025-03-17 NOTE — TELEPHONE ENCOUNTER
Submitted PA for Nurtec 75MG dispersible tablets   Via CM (Key: [id number]) STATUS: PENDING.    Follow up done daily; if no decision with in three days we will refax.  If another three days goes by with no decision will call the insurance for status.

## 2025-03-18 NOTE — TELEPHONE ENCOUNTER
The medication is APPROVED THRU 09/21/25    If this requires a response please respond to the pool ( P MHCX [facility] MEDICATION PRE-AUTH).      Thank you please advise patient.

## 2025-03-20 ENCOUNTER — OFFICE VISIT (OUTPATIENT)
Dept: INTERNAL MEDICINE CLINIC | Age: 50
End: 2025-03-20
Payer: COMMERCIAL

## 2025-03-20 ENCOUNTER — TELEPHONE (OUTPATIENT)
Dept: INTERNAL MEDICINE CLINIC | Age: 50
End: 2025-03-20

## 2025-03-20 VITALS
BODY MASS INDEX: 32 KG/M2 | OXYGEN SATURATION: 98 % | WEIGHT: 228.6 LBS | SYSTOLIC BLOOD PRESSURE: 136 MMHG | HEIGHT: 71 IN | DIASTOLIC BLOOD PRESSURE: 86 MMHG | HEART RATE: 90 BPM

## 2025-03-20 DIAGNOSIS — I10 HTN (HYPERTENSION), BENIGN: ICD-10-CM

## 2025-03-20 DIAGNOSIS — B36.9 FUNGAL DERMATITIS: Primary | ICD-10-CM

## 2025-03-20 PROCEDURE — 3075F SYST BP GE 130 - 139MM HG: CPT | Performed by: INTERNAL MEDICINE

## 2025-03-20 PROCEDURE — 99214 OFFICE O/P EST MOD 30 MIN: CPT | Performed by: INTERNAL MEDICINE

## 2025-03-20 PROCEDURE — 3079F DIAST BP 80-89 MM HG: CPT | Performed by: INTERNAL MEDICINE

## 2025-03-20 PROCEDURE — 1036F TOBACCO NON-USER: CPT | Performed by: INTERNAL MEDICINE

## 2025-03-20 PROCEDURE — 3017F COLORECTAL CA SCREEN DOC REV: CPT | Performed by: INTERNAL MEDICINE

## 2025-03-20 PROCEDURE — G8427 DOCREV CUR MEDS BY ELIG CLIN: HCPCS | Performed by: INTERNAL MEDICINE

## 2025-03-20 PROCEDURE — G8417 CALC BMI ABV UP PARAM F/U: HCPCS | Performed by: INTERNAL MEDICINE

## 2025-03-20 PROCEDURE — G2211 COMPLEX E/M VISIT ADD ON: HCPCS | Performed by: INTERNAL MEDICINE

## 2025-03-20 RX ORDER — FLUCONAZOLE 100 MG/1
100 TABLET ORAL DAILY
Qty: 7 TABLET | Refills: 0 | Status: SHIPPED | OUTPATIENT
Start: 2025-03-20 | End: 2025-03-27

## 2025-03-20 RX ORDER — CLOTRIMAZOLE AND BETAMETHASONE DIPROPIONATE 10; .64 MG/G; MG/G
CREAM TOPICAL
Qty: 135 G | Refills: 1 | Status: SHIPPED | OUTPATIENT
Start: 2025-03-20

## 2025-03-20 ASSESSMENT — ENCOUNTER SYMPTOMS: BLURRED VISION: 0

## 2025-03-20 NOTE — PROGRESS NOTES
Lindsay Fay (:  1975) is a 49 y.o. male,Established patient, here for evaluation of the following chief complaint(s):  Rash      Assessment & Plan   ASSESSMENT/PLAN:  1. Fungal dermatitis  -     clotrimazole-betamethasone (LOTRISONE) 1-0.05 % cream; Apply topically 2 times daily., Disp-135 g, R-1, Normal  -     fluconazole (DIFLUCAN) 100 MG tablet; Take 1 tablet by mouth daily for 7 days, Disp-7 tablet, R-0Normal    Patient presenting with the recurrent rash it is affecting certain areas including the chest umbilical area as well as the groin area it is very consistent with a fungal rash with the shape and distribution at this point we will virgil use an oral antifungal agent as well as a topical antifungal agent with some topical corticosteroids if the patient symptoms improve within the next few days then we will just continue to monitor clinically and the other hand if it does not get better and given that he is concerned about food allergies we will refer the patient to an allergist for further assessment    Patient blood pressure was elevated initial presentation after resting it did improve he is always running at the borderline elevated blood pressure hopefully if he can lose a few pounds that will help him to drop into the better range of 130/80 or less  Return if symptoms worsen or fail to improve, for As scheduled.         Subjective   SUBJECTIVE/OBJECTIVE:    Lab Review   Lab Results   Component Value Date/Time     2024 05:57 AM     2024 05:17 AM     2024 08:00 AM    K 4.2 2024 05:57 AM    K 3.8 2024 05:17 AM    K 3.4 2024 08:00 AM    K 4.3 11/15/2024 05:49 AM    K 4.4 2024 05:19 AM    K 3.9 2024 06:51 AM    CO2 22 2024 05:57 AM    CO2 25 2024 05:17 AM    CO2 23 2024 08:00 AM    BUN 10 2024 05:57 AM    BUN 8 2024 05:17 AM    BUN 12 2024 08:00 AM    CREATININE 1.3 2024 05:57 AM

## 2025-03-20 NOTE — TELEPHONE ENCOUNTER
Patient requesting appointment tomorrow with Dr Calvert for rash on chest and stomach.      RASH    Patient complains of rash on bilateral chest and stomach  The rash first started a few days ago. Pt said had same rash 2 years ago  Rash has not changed  Any other symptoms none  Any new contact irritants No   Any home treatment No  If so did it help N/A  pt said prescribed prednisone for rash 2 years ago that helped  New medication No  Any recent illness No  Patient has not had new exposures (soaps, lotions, laundry detergents, foods, medications, plants, insects or animals)

## 2025-03-27 ENCOUNTER — TELEPHONE (OUTPATIENT)
Dept: INTERNAL MEDICINE CLINIC | Age: 50
End: 2025-03-27

## 2025-03-27 ENCOUNTER — TELEPHONE (OUTPATIENT)
Dept: ADMINISTRATIVE | Age: 50
End: 2025-03-27

## 2025-03-27 DIAGNOSIS — R21 RASH: ICD-10-CM

## 2025-03-27 DIAGNOSIS — B36.9 FUNGAL DERMATITIS: Primary | ICD-10-CM

## 2025-03-27 NOTE — TELEPHONE ENCOUNTER
Pt calling saw yamile 3/20 fir rash-itching---took his last pill for this yesterday --has cleared up a lot but still having some rash and itching--can you call him in more medicine or do you need to see him---Please let pt know. Thanks.

## 2025-03-27 NOTE — TELEPHONE ENCOUNTER
Submitted PA for Ubrelvy 50MG tablets  Via [facility] CHU STATUS: PENDING.    Follow up done daily; if no decision with in three days we will refax.  If another three days goes by with no decision will call the insurance for status.

## 2025-03-27 NOTE — TELEPHONE ENCOUNTER
Last OV note- Patient presenting with the recurrent rash it is affecting certain areas including the chest umbilical area as well as the groin area it is very consistent with a fungal rash with the shape and distribution at this point we will virgil use an oral antifungal agent as well as a topical antifungal agent with some topical corticosteroids if the patient symptoms improve within the next few days then we will just continue to monitor clinically and the other hand if it does not get better and given that he is concerned about food allergies we will refer the patient to an allergist for further assessment.    Referral pended to allergist using diagnosis rash and fungal dermatitis.  Please adjust if necessary.

## 2025-03-28 NOTE — TELEPHONE ENCOUNTER
The medication was DENIED; DENIAL letter is uploaded to MED[employer].    Generic Denial: Auto response per portal. No letter generated. please see note below      Note :  Outcome  Denied on March 27 by [place] [employer] 2017  Coverage is provided when clinical information is provided including but not limited to the following: There are no pharmacy claims for Ubrelvy.      If you want an APPEAL; please note in this encounter what new information you would like to APPEAL with.  Once complete route back to PA POOL.    If this requires a response please respond to the pool ( P [facility] MEDICATION PRE-AUTH).      Thank you please advise patient.

## 2025-04-29 ENCOUNTER — TELEPHONE (OUTPATIENT)
Dept: INTERNAL MEDICINE CLINIC | Age: 50
End: 2025-04-29

## 2025-04-29 DIAGNOSIS — B36.9 FUNGAL DERMATITIS: ICD-10-CM

## 2025-04-29 NOTE — TELEPHONE ENCOUNTER
Pt calling requesting refill of Clotrimazole and Fluconazole     Last written 3/20/25 (rash not gone yet better but not gone)  Last OV 3/20/25  Next OV none  Last recommended OV NA     Please send to [facility] [address]

## 2025-05-01 RX ORDER — FLUCONAZOLE 100 MG/1
100 TABLET ORAL DAILY
Qty: 7 TABLET | Refills: 0 | Status: SHIPPED | OUTPATIENT
Start: 2025-05-01 | End: 2025-05-08

## 2025-05-01 RX ORDER — CLOTRIMAZOLE AND BETAMETHASONE DIPROPIONATE 10; .64 MG/G; MG/G
CREAM TOPICAL
Qty: 135 G | Refills: 1 | Status: SHIPPED | OUTPATIENT
Start: 2025-05-01

## 2025-06-02 DIAGNOSIS — I10 HTN (HYPERTENSION), BENIGN: ICD-10-CM

## 2025-06-02 RX ORDER — AMLODIPINE BESYLATE 10 MG/1
10 TABLET ORAL DAILY
Qty: 30 TABLET | Refills: 0 | Status: SHIPPED | OUTPATIENT
Start: 2025-06-02

## 2025-06-24 ENCOUNTER — TELEPHONE (OUTPATIENT)
Dept: INTERNAL MEDICINE CLINIC | Age: 50
End: 2025-06-24

## 2025-06-24 DIAGNOSIS — I10 HTN (HYPERTENSION), BENIGN: ICD-10-CM

## 2025-06-24 RX ORDER — AMLODIPINE BESYLATE 10 MG/1
10 TABLET ORAL DAILY
Qty: 30 TABLET | Refills: 0 | OUTPATIENT
Start: 2025-06-24

## 2025-06-24 NOTE — TELEPHONE ENCOUNTER
Pt reporting he lost his bottle of Amlodpine and is asking for a refill. He is not sure how many he had left but was at the end of his last 90 day supply.    After call ended this writer noted pt has a rx on file 6/2 and he was called to see if he picked that up yet and if not to please call the pharmacy to get that processed. He was also asked to let us know if there are any issues with that rx.

## 2025-06-30 DIAGNOSIS — I10 HTN (HYPERTENSION), BENIGN: ICD-10-CM

## 2025-06-30 RX ORDER — AMLODIPINE BESYLATE 10 MG/1
10 TABLET ORAL DAILY
Qty: 30 TABLET | Refills: 0 | Status: SHIPPED | OUTPATIENT
Start: 2025-06-30

## 2025-06-30 NOTE — TELEPHONE ENCOUNTER
Last OV: 3/20/2025  Next OV: Visit date not found    Next appointment duearound 4/22/2025 :    Last fill:6/2/25  Refills:0

## (undated) DEVICE — STRIP,CLOSURE,WOUND,MEDI-STRIP,1/2X4: Brand: MEDLINE

## (undated) DEVICE — CYSTO: Brand: MEDLINE INDUSTRIES, INC.

## (undated) DEVICE — SOLUTION IRRIGATION STRL H2O 1000 ML UROMATIC CONTAINER

## (undated) DEVICE — CATHETER URET 5FR L70CM OPN END SGL LUMN INJ HUB FLEXIMA

## (undated) DEVICE — SOLUTION IRRIG 1000ML STRL H2O USP PLAS POUR BTL

## (undated) DEVICE — WET SKIN PREP TRAY: Brand: MEDLINE INDUSTRIES, INC.

## (undated) DEVICE — GLOVE ORANGE PI 7   MSG9070

## (undated) DEVICE — GUIDEWIRE ENDOSCP L150CM DIA0.035IN TIP 3CM PTFE NIT

## (undated) DEVICE — ADAPTER URO SCP UROLOK LL

## (undated) DEVICE — BAG DRAINAGE NS

## (undated) DEVICE — Y-TYPE TUR/BLADDER IRRIGATION SET, REGULATING CLAMP

## (undated) DEVICE — MASTISOL ADHESIVE LIQ 2/3ML

## (undated) DEVICE — NITINOL STONE RETRIEVAL BASKET: Brand: ZERO TIP

## (undated) DEVICE — UROCATCH UROLOGY DRAIN BAGS